# Patient Record
Sex: FEMALE | Race: WHITE
[De-identification: names, ages, dates, MRNs, and addresses within clinical notes are randomized per-mention and may not be internally consistent; named-entity substitution may affect disease eponyms.]

---

## 2017-05-30 ENCOUNTER — APPOINTMENT (OUTPATIENT)
Dept: NEUROLOGY | Facility: CLINIC | Age: 77
End: 2017-05-30

## 2017-11-02 ENCOUNTER — EMERGENCY (EMERGENCY)
Facility: HOSPITAL | Age: 77
LOS: 0 days | Discharge: HOME | End: 2017-11-02
Attending: EMERGENCY MEDICINE | Admitting: EMERGENCY MEDICINE
Payer: COMMERCIAL

## 2017-11-02 VITALS
SYSTOLIC BLOOD PRESSURE: 127 MMHG | TEMPERATURE: 98 F | OXYGEN SATURATION: 100 % | HEIGHT: 62 IN | DIASTOLIC BLOOD PRESSURE: 67 MMHG | RESPIRATION RATE: 18 BRPM | HEART RATE: 63 BPM | WEIGHT: 147.05 LBS

## 2017-11-02 VITALS
HEART RATE: 62 BPM | RESPIRATION RATE: 18 BRPM | SYSTOLIC BLOOD PRESSURE: 99 MMHG | TEMPERATURE: 98 F | DIASTOLIC BLOOD PRESSURE: 61 MMHG | OXYGEN SATURATION: 99 %

## 2017-11-02 DIAGNOSIS — Z98.42 CATARACT EXTRACTION STATUS, LEFT EYE: Chronic | ICD-10-CM

## 2017-11-02 LAB
ALBUMIN SERPL ELPH-MCNC: 3.8 G/DL — SIGNIFICANT CHANGE UP (ref 3.3–5)
ALP SERPL-CCNC: 78 U/L — SIGNIFICANT CHANGE UP (ref 40–120)
ALT FLD-CCNC: 40 U/L — SIGNIFICANT CHANGE UP (ref 12–78)
ANION GAP SERPL CALC-SCNC: 9 MMOL/L — SIGNIFICANT CHANGE UP (ref 5–17)
APPEARANCE UR: CLEAR — SIGNIFICANT CHANGE UP
APTT BLD: 26 SEC — LOW (ref 27.5–37.4)
AST SERPL-CCNC: 27 U/L — SIGNIFICANT CHANGE UP (ref 15–37)
BACTERIA # UR AUTO: (no result)
BASOPHILS # BLD AUTO: 0.1 K/UL — SIGNIFICANT CHANGE UP (ref 0–0.2)
BASOPHILS NFR BLD AUTO: 0.7 % — SIGNIFICANT CHANGE UP (ref 0–2)
BILIRUB SERPL-MCNC: 1.7 MG/DL — HIGH (ref 0.2–1.2)
BILIRUB UR-MCNC: NEGATIVE — SIGNIFICANT CHANGE UP
BUN SERPL-MCNC: 13 MG/DL — SIGNIFICANT CHANGE UP (ref 7–23)
CALCIUM SERPL-MCNC: 9.4 MG/DL — SIGNIFICANT CHANGE UP (ref 8.5–10.1)
CHLORIDE SERPL-SCNC: 109 MMOL/L — HIGH (ref 96–108)
CO2 SERPL-SCNC: 24 MMOL/L — SIGNIFICANT CHANGE UP (ref 22–31)
COLOR SPEC: YELLOW — SIGNIFICANT CHANGE UP
CREAT SERPL-MCNC: 1.27 MG/DL — SIGNIFICANT CHANGE UP (ref 0.5–1.3)
DIFF PNL FLD: (no result)
EOSINOPHIL # BLD AUTO: 0 K/UL — SIGNIFICANT CHANGE UP (ref 0–0.5)
EOSINOPHIL NFR BLD AUTO: 0 % — SIGNIFICANT CHANGE UP (ref 0–6)
EPI CELLS # UR: (no result)
GLUCOSE SERPL-MCNC: 137 MG/DL — HIGH (ref 70–99)
GLUCOSE UR QL: NEGATIVE MG/DL — SIGNIFICANT CHANGE UP
HCT VFR BLD CALC: 51.1 % — HIGH (ref 34.5–45)
HGB BLD-MCNC: 16.6 G/DL — HIGH (ref 11.5–15.5)
INR BLD: 1.05 RATIO — SIGNIFICANT CHANGE UP (ref 0.88–1.16)
KETONES UR-MCNC: (no result)
LEUKOCYTE ESTERASE UR-ACNC: (no result)
LIDOCAIN IGE QN: 114 U/L — SIGNIFICANT CHANGE UP (ref 73–393)
LYMPHOCYTES # BLD AUTO: 0.8 K/UL — LOW (ref 1–3.3)
LYMPHOCYTES # BLD AUTO: 7.2 % — LOW (ref 13–44)
MAGNESIUM SERPL-MCNC: 2.3 MG/DL — SIGNIFICANT CHANGE UP (ref 1.6–2.6)
MCHC RBC-ENTMCNC: 29.2 PG — SIGNIFICANT CHANGE UP (ref 27–34)
MCHC RBC-ENTMCNC: 32.5 GM/DL — SIGNIFICANT CHANGE UP (ref 32–36)
MCV RBC AUTO: 89.9 FL — SIGNIFICANT CHANGE UP (ref 80–100)
MONOCYTES # BLD AUTO: 0.2 K/UL — SIGNIFICANT CHANGE UP (ref 0–0.9)
MONOCYTES NFR BLD AUTO: 2 % — SIGNIFICANT CHANGE UP (ref 2–14)
NEUTROPHILS # BLD AUTO: 9.7 K/UL — HIGH (ref 1.8–7.4)
NEUTROPHILS NFR BLD AUTO: 90 % — HIGH (ref 43–77)
NITRITE UR-MCNC: NEGATIVE — SIGNIFICANT CHANGE UP
PH UR: 8 — SIGNIFICANT CHANGE UP (ref 5–8)
PLATELET # BLD AUTO: 262 K/UL — SIGNIFICANT CHANGE UP (ref 150–400)
POTASSIUM SERPL-MCNC: 3.9 MMOL/L — SIGNIFICANT CHANGE UP (ref 3.5–5.3)
POTASSIUM SERPL-SCNC: 3.9 MMOL/L — SIGNIFICANT CHANGE UP (ref 3.5–5.3)
PROT SERPL-MCNC: 7.6 GM/DL — SIGNIFICANT CHANGE UP (ref 6–8.3)
PROT UR-MCNC: 15 MG/DL
PROTHROM AB SERPL-ACNC: 11.4 SEC — SIGNIFICANT CHANGE UP (ref 9.8–12.7)
RBC # BLD: 5.69 M/UL — HIGH (ref 3.8–5.2)
RBC # FLD: 13.4 % — SIGNIFICANT CHANGE UP (ref 10.3–14.5)
RBC CASTS # UR COMP ASSIST: (no result) /HPF (ref 0–4)
SODIUM SERPL-SCNC: 142 MMOL/L — SIGNIFICANT CHANGE UP (ref 135–145)
SP GR SPEC: 1.01 — SIGNIFICANT CHANGE UP (ref 1.01–1.02)
UROBILINOGEN FLD QL: NEGATIVE MG/DL — SIGNIFICANT CHANGE UP
WBC # BLD: 10.8 K/UL — HIGH (ref 3.8–10.5)
WBC # FLD AUTO: 10.8 K/UL — HIGH (ref 3.8–10.5)
WBC UR QL: (no result)

## 2017-11-02 PROCEDURE — 74176 CT ABD & PELVIS W/O CONTRAST: CPT | Mod: 26

## 2017-11-02 PROCEDURE — 99284 EMERGENCY DEPT VISIT MOD MDM: CPT

## 2017-11-02 RX ORDER — SODIUM CHLORIDE 9 MG/ML
1000 INJECTION INTRAMUSCULAR; INTRAVENOUS; SUBCUTANEOUS ONCE
Qty: 0 | Refills: 0 | Status: COMPLETED | OUTPATIENT
Start: 2017-11-02 | End: 2017-11-02

## 2017-11-02 RX ORDER — ONDANSETRON 8 MG/1
4 TABLET, FILM COATED ORAL ONCE
Qty: 0 | Refills: 0 | Status: COMPLETED | OUTPATIENT
Start: 2017-11-02 | End: 2017-11-02

## 2017-11-02 RX ORDER — CEPHALEXIN 500 MG
1 CAPSULE ORAL
Qty: 28 | Refills: 0
Start: 2017-11-02 | End: 2017-11-09

## 2017-11-02 RX ORDER — MORPHINE SULFATE 50 MG/1
4 CAPSULE, EXTENDED RELEASE ORAL ONCE
Qty: 0 | Refills: 0 | Status: DISCONTINUED | OUTPATIENT
Start: 2017-11-02 | End: 2017-11-02

## 2017-11-02 RX ADMIN — SODIUM CHLORIDE 1000 MILLILITER(S): 9 INJECTION INTRAMUSCULAR; INTRAVENOUS; SUBCUTANEOUS at 03:29

## 2017-11-02 RX ADMIN — MORPHINE SULFATE 4 MILLIGRAM(S): 50 CAPSULE, EXTENDED RELEASE ORAL at 03:01

## 2017-11-02 RX ADMIN — MORPHINE SULFATE 4 MILLIGRAM(S): 50 CAPSULE, EXTENDED RELEASE ORAL at 03:29

## 2017-11-02 RX ADMIN — ONDANSETRON 4 MILLIGRAM(S): 8 TABLET, FILM COATED ORAL at 03:01

## 2017-11-02 NOTE — ED PROVIDER NOTE - OBJECTIVE STATEMENT
76 yo female with h/o hypothyroidsism c/o right sided back and abdominal pain since around 4pm.  +nausea and vomiting.  Patient states that in the car on the way to the ED, the pain improved and she currently has no pain.  Tried taking an Aleve at home, but vomited it up.  Pt reports many months of decreased appetite and difficulty tasting food, which is being worked up as an outpatient.  She also has had many months of increased BMs, also being worked up.   No fevers/chills.  no chest pain, sob.  No URI symptoms.  Denies previous h/o similar.  Does not drink or smoke.  PSH:  cholecystectomy and partial colectomy for diverticulitis.

## 2017-11-02 NOTE — ED PROVIDER NOTE - PMH
Acute type 2 diabetes mellitus with manifestations  boderline- diet controlled  Ankle fracture  s/p right- cast  Diverticulosis    Fibromyalgia    H/O: hypothyroidism    Hypercholesterolemia

## 2017-11-02 NOTE — ED PROVIDER NOTE - PSH
Diverticulitis of sigmoid colon  s/p sigmoidectomy  History of cholecystectomy    History of tonsillectomy    S/P cataract surgery, left

## 2017-11-02 NOTE — ED PROVIDER NOTE - CONSTITUTIONAL, MLM
normal... Well appearing, well nourished, awake, alert, oriented to person, place, time/situation and in no apparent distress.  Well appearing

## 2017-11-07 DIAGNOSIS — R10.9 UNSPECIFIED ABDOMINAL PAIN: ICD-10-CM

## 2017-11-07 DIAGNOSIS — K57.30 DIVERTICULOSIS OF LARGE INTESTINE WITHOUT PERFORATION OR ABSCESS WITHOUT BLEEDING: ICD-10-CM

## 2017-11-07 DIAGNOSIS — M54.9 DORSALGIA, UNSPECIFIED: ICD-10-CM

## 2017-11-07 DIAGNOSIS — E03.9 HYPOTHYROIDISM, UNSPECIFIED: ICD-10-CM

## 2017-11-07 DIAGNOSIS — N39.0 URINARY TRACT INFECTION, SITE NOT SPECIFIED: ICD-10-CM

## 2017-11-07 DIAGNOSIS — E11.9 TYPE 2 DIABETES MELLITUS WITHOUT COMPLICATIONS: ICD-10-CM

## 2017-11-07 DIAGNOSIS — R11.2 NAUSEA WITH VOMITING, UNSPECIFIED: ICD-10-CM

## 2018-04-07 ENCOUNTER — EMERGENCY (EMERGENCY)
Facility: HOSPITAL | Age: 78
LOS: 0 days | Discharge: HOME | End: 2018-04-07
Attending: EMERGENCY MEDICINE | Admitting: EMERGENCY MEDICINE
Payer: COMMERCIAL

## 2018-04-07 VITALS
TEMPERATURE: 97 F | WEIGHT: 149.91 LBS | SYSTOLIC BLOOD PRESSURE: 149 MMHG | DIASTOLIC BLOOD PRESSURE: 74 MMHG | RESPIRATION RATE: 14 BRPM | OXYGEN SATURATION: 100 % | HEART RATE: 76 BPM | HEIGHT: 63 IN

## 2018-04-07 DIAGNOSIS — R11.2 NAUSEA WITH VOMITING, UNSPECIFIED: ICD-10-CM

## 2018-04-07 DIAGNOSIS — Z98.42 CATARACT EXTRACTION STATUS, LEFT EYE: Chronic | ICD-10-CM

## 2018-04-07 DIAGNOSIS — B34.9 VIRAL INFECTION, UNSPECIFIED: ICD-10-CM

## 2018-04-07 DIAGNOSIS — R50.9 FEVER, UNSPECIFIED: ICD-10-CM

## 2018-04-07 PROCEDURE — 70450 CT HEAD/BRAIN W/O DYE: CPT | Mod: 26

## 2018-04-07 PROCEDURE — 70486 CT MAXILLOFACIAL W/O DYE: CPT | Mod: 26

## 2018-04-07 PROCEDURE — 72125 CT NECK SPINE W/O DYE: CPT | Mod: 26

## 2018-04-07 PROCEDURE — 73564 X-RAY EXAM KNEE 4 OR MORE: CPT | Mod: 26,RT

## 2018-04-07 PROCEDURE — 71045 X-RAY EXAM CHEST 1 VIEW: CPT | Mod: 26

## 2018-04-07 PROCEDURE — 76377 3D RENDER W/INTRP POSTPROCES: CPT | Mod: 26

## 2018-04-07 PROCEDURE — 99284 EMERGENCY DEPT VISIT MOD MDM: CPT

## 2018-04-07 PROCEDURE — 72190 X-RAY EXAM OF PELVIS: CPT | Mod: 26

## 2018-04-07 RX ORDER — TETANUS TOXOID, REDUCED DIPHTHERIA TOXOID AND ACELLULAR PERTUSSIS VACCINE, ADSORBED 5; 2.5; 8; 8; 2.5 [IU]/.5ML; [IU]/.5ML; UG/.5ML; UG/.5ML; UG/.5ML
0.5 SUSPENSION INTRAMUSCULAR ONCE
Qty: 0 | Refills: 0 | Status: COMPLETED | OUTPATIENT
Start: 2018-04-07 | End: 2018-04-07

## 2018-04-07 RX ORDER — CEPHALEXIN 500 MG
1 CAPSULE ORAL
Qty: 20 | Refills: 0
Start: 2018-04-07 | End: 2018-04-11

## 2018-04-07 RX ORDER — OXYCODONE AND ACETAMINOPHEN 5; 325 MG/1; MG/1
1 TABLET ORAL ONCE
Qty: 0 | Refills: 0 | Status: DISCONTINUED | OUTPATIENT
Start: 2018-04-07 | End: 2018-04-07

## 2018-04-07 RX ORDER — CEPHALEXIN 500 MG
500 CAPSULE ORAL ONCE
Qty: 0 | Refills: 0 | Status: COMPLETED | OUTPATIENT
Start: 2018-04-07 | End: 2018-04-07

## 2018-04-07 RX ADMIN — OXYCODONE AND ACETAMINOPHEN 1 TABLET(S): 5; 325 TABLET ORAL at 20:28

## 2018-04-07 RX ADMIN — TETANUS TOXOID, REDUCED DIPHTHERIA TOXOID AND ACELLULAR PERTUSSIS VACCINE, ADSORBED 0.5 MILLILITER(S): 5; 2.5; 8; 8; 2.5 SUSPENSION INTRAMUSCULAR at 20:26

## 2018-04-07 RX ADMIN — Medication 500 MILLIGRAM(S): at 20:26

## 2018-04-07 NOTE — ED STATDOCS - OBJECTIVE STATEMENT
76 y/o female with a PMHx of hypothyroid presents to the ED s/p fall today. Pt states she was in front of her house and fell down 2 steps outside landing on her face. No LOC. No anti-coagulants. Pt now c/o upper lip laceration, tooth pain, buttocks pain, LLE pain. No fever or any other acute complaints at this time. Pt states she has bonded teeth. Tetanus is not UTD.

## 2018-04-07 NOTE — ED STATDOCS - ATTENDING CONTRIBUTION TO CARE
I, Reji Garza MD,  performed the initial face to face bedside interview with this patient regarding history of present illness, review of symptoms and relevant past medical, social and family history.  I completed an independent physical examination.  I was the initial provider who evaluated this patient. I have signed out the follow up of any pending tests (i.e. labs, radiological studies) to the ACP.  I have communicated the patient’s plan of care and disposition with the ACP.

## 2018-04-07 NOTE — ED ADULT NURSE NOTE - CHIEF COMPLAINT QUOTE
fall in front of house, chipped tooth. ambulatory at scene. denies LOC, denies blood thinners, denies LOC.

## 2018-04-07 NOTE — ED ADULT NURSE NOTE - OBJECTIVE STATEMENT
Fall outside of house down 2 steps.  C/o toothpain, laceration to lip, back pain.  Pt denies LOC, denies hitting head.

## 2018-04-07 NOTE — ED STATDOCS - SKIN, MLM
skin normal color for race, warm, dry. +laceration to right inner lip approximately 2-3mm with bonding debris inside. Laceration to right outer lip approximately 2mm.

## 2018-04-07 NOTE — ED STATDOCS - PROGRESS NOTE DETAILS
2 lacerations repaired under sterile fashion. See procedure note. Patient is feeling much better, tests/labs reviewed. case discussed with attending. OK to dc home. LINDA Lehman

## 2018-04-07 NOTE — ED PROCEDURE NOTE - CPROC ED POST PROC CARE GUIDE1
Keep the cast/splint/dressing clean and dry./Verbal/written post procedure instructions were given to patient/caregiver./Instructed patient/caregiver to follow-up with primary care physician./Instructed patient/caregiver regarding signs and symptoms of infection.
Verbal/written post procedure instructions were given to patient/caregiver./Instructed patient/caregiver regarding signs and symptoms of infection./Keep the cast/splint/dressing clean and dry./Instructed patient/caregiver to follow-up with primary care physician.

## 2018-04-07 NOTE — ED STATDOCS - NS_ ATTENDINGSCRIBEDETAILS _ED_A_ED_FT
I, Reji Garza MD,  performed the initial face to face bedside interview with this patient regarding history of present illness, review of symptoms and relevant past medical, social and family history.  I completed an independent physical examination.    The history, relevant review of systems, past medical and surgical history, medical decision making, and physical examination was documented by the scribe in my presence and I attest to the accuracy of the documentation.

## 2018-04-07 NOTE — ED PROCEDURE NOTE - CPROC ED LACER REPAIR DETAIL1
All foreign material was removed.
The wound was explored to base in bloodless field./No foreign body

## 2018-04-07 NOTE — ED STATDOCS - PHYSICAL EXAMINATION
GEN: AOX3, NAD. HEENT: Throat clear. +cracked central incisor noted. Head NC/AT. FACE: LAC#1: 1.0 cm LAC noted inner aspect of upper lip mucosa. Mild bleeding. LAC#2: +1.0cm very superficial linear LAC noted upper lip that does not involve vermilion border. No bleeding. Not through and through. No FBs. NECK: Supple, No JVD. FROM. C-spine non-tender. CV:S1S2, RRR, LUNGS: CTA b/l, no w/r/r. ABD: Soft, NT/ND, no rebound, no guarding. No CVAT. EXT: No e/c/c. Minimal tenderness left knee and right hip. NO deformity. FROM. 2+ distal pulses. SKIN: No rashes. NEURO: No focal deficits. CN II-XII intact. FROM. 5/5 motor and sensory. LINDA Lehman

## 2018-04-07 NOTE — ED STATDOCS - CARE PLAN
Principal Discharge DX:	Lip laceration  Secondary Diagnosis:	Dental injury  Secondary Diagnosis:	Knee injury

## 2018-04-07 NOTE — ED ADULT TRIAGE NOTE - CHIEF COMPLAINT QUOTE
fall in front of house, chipped tooth. ambulatory at scene. denies LOC, denies blood thinners. fall in front of house, chipped tooth. ambulatory at scene. denies LOC, denies blood thinners, denies LOC.

## 2018-04-07 NOTE — ED STATDOCS - ENMT, MLM
Nasal mucosa clear.  Mouth with normal mucosa  Throat has no vesicles, no oropharyngeal exudates and uvula is midline. 2 front teeth are chipped and bonding material flaking off

## 2018-05-27 ENCOUNTER — EMERGENCY (EMERGENCY)
Facility: HOSPITAL | Age: 78
LOS: 0 days | Discharge: HOME | End: 2018-05-27
Attending: EMERGENCY MEDICINE | Admitting: EMERGENCY MEDICINE
Payer: COMMERCIAL

## 2018-05-27 VITALS
HEART RATE: 82 BPM | RESPIRATION RATE: 18 BRPM | TEMPERATURE: 98 F | DIASTOLIC BLOOD PRESSURE: 90 MMHG | HEIGHT: 62 IN | WEIGHT: 139.99 LBS | SYSTOLIC BLOOD PRESSURE: 140 MMHG | OXYGEN SATURATION: 100 %

## 2018-05-27 DIAGNOSIS — Z98.42 CATARACT EXTRACTION STATUS, LEFT EYE: Chronic | ICD-10-CM

## 2018-05-27 DIAGNOSIS — M79.7 FIBROMYALGIA: ICD-10-CM

## 2018-05-27 DIAGNOSIS — E11.9 TYPE 2 DIABETES MELLITUS WITHOUT COMPLICATIONS: ICD-10-CM

## 2018-05-27 DIAGNOSIS — E78.5 HYPERLIPIDEMIA, UNSPECIFIED: ICD-10-CM

## 2018-05-27 DIAGNOSIS — R31.9 HEMATURIA, UNSPECIFIED: ICD-10-CM

## 2018-05-27 DIAGNOSIS — N30.01 ACUTE CYSTITIS WITH HEMATURIA: ICD-10-CM

## 2018-05-27 DIAGNOSIS — K57.90 DIVERTICULOSIS OF INTESTINE, PART UNSPECIFIED, WITHOUT PERFORATION OR ABSCESS WITHOUT BLEEDING: ICD-10-CM

## 2018-05-27 LAB
APPEARANCE UR: (no result)
BACTERIA # UR AUTO: (no result)
BILIRUB UR-MCNC: (no result)
COLOR SPEC: (no result)
DIFF PNL FLD: (no result)
EPI CELLS # UR: SIGNIFICANT CHANGE UP
GLUCOSE UR QL: NEGATIVE MG/DL — SIGNIFICANT CHANGE UP
KETONES UR-MCNC: (no result)
LEUKOCYTE ESTERASE UR-ACNC: (no result)
NITRITE UR-MCNC: POSITIVE
PH UR: 6 — SIGNIFICANT CHANGE UP (ref 5–8)
PROT UR-MCNC: 500 MG/DL
RBC CASTS # UR COMP ASSIST: >50 /HPF (ref 0–4)
SP GR SPEC: 1.02 — SIGNIFICANT CHANGE UP (ref 1.01–1.02)
UROBILINOGEN FLD QL: 4 MG/DL
WBC UR QL: >50

## 2018-05-27 PROCEDURE — 99283 EMERGENCY DEPT VISIT LOW MDM: CPT

## 2018-05-27 RX ORDER — PHENAZOPYRIDINE HCL 100 MG
100 TABLET ORAL ONCE
Qty: 0 | Refills: 0 | Status: COMPLETED | OUTPATIENT
Start: 2018-05-27 | End: 2018-05-27

## 2018-05-27 RX ORDER — NITROFURANTOIN MACROCRYSTAL 50 MG
100 CAPSULE ORAL ONCE
Qty: 0 | Refills: 0 | Status: DISCONTINUED | OUTPATIENT
Start: 2018-05-27 | End: 2018-05-27

## 2018-05-27 RX ORDER — PHENAZOPYRIDINE HCL 100 MG
1 TABLET ORAL
Qty: 6 | Refills: 0
Start: 2018-05-27 | End: 2018-05-28

## 2018-05-27 RX ORDER — NITROFURANTOIN MACROCRYSTAL 50 MG
1 CAPSULE ORAL
Qty: 14 | Refills: 0
Start: 2018-05-27 | End: 2018-06-02

## 2018-05-27 RX ADMIN — Medication 100 MILLIGRAM(S): at 23:15

## 2018-05-27 NOTE — ED ADULT NURSE NOTE - OBJECTIVE STATEMENT
77 year old female presenting to the ED with a one day history of frequent urination, pain with urination, and pink colored urine. Patient states that she has been having frequent episodes of "dribbling," urine, with the sensation of urinary fullness post voiding. Patient reports mild 3/10 burning sensation during urination, and that the urine is red/pink in coloration.   Negative: Fevers, chills, headaches, syncope, vision changes, change in baseline mentation, chest pain, shortness of breath, abdominal pain, nausea, vomiting, constipation, diarrhea, CVA tenderness, flank pain, reported stone passage, trauma, vaginal bleeding, or edema.   Abdomen soft/non-tender without CVA or bladder tenderness/distention.

## 2018-05-27 NOTE — ED ADULT NURSE REASSESSMENT NOTE - NS ED NURSE REASSESS COMMENT FT1
2345: Extensive patient education on signs and symptoms to return to the ER as well as follow up care preformed with patient and her spouse.

## 2018-05-27 NOTE — ED PROVIDER NOTE - MEDICAL DECISION MAKING DETAILS
Likely UTI/cystitis: Will send UA and urine culture and treat with pyridium.  If UA is positive, antibiotic will also be given.

## 2018-05-27 NOTE — ED PROVIDER NOTE - OBJECTIVE STATEMENT
Pt. is a 76 yo F with a hx of diverticulitis and colon resection, dementia BIB  for 1 day of urinary frequency and blood tinged urine.  Pt. denies fevers, has normal PO intake without vomiting.  +dysuria    states pt. has had UTI once in the past with the same symptoms.  Pt has small volumes of urine when she does go to the bathroom.  Pt. denies abdominal pain or back pain or trauma.  Pt. is not taking any anti-coagulation.

## 2018-05-30 LAB
-  AMIKACIN: SIGNIFICANT CHANGE UP
-  AMOXICILLIN/CLAVULANIC ACID: SIGNIFICANT CHANGE UP
-  AMPICILLIN/SULBACTAM: SIGNIFICANT CHANGE UP
-  AMPICILLIN: SIGNIFICANT CHANGE UP
-  AZTREONAM: SIGNIFICANT CHANGE UP
-  CEFAZOLIN: SIGNIFICANT CHANGE UP
-  CEFEPIME: SIGNIFICANT CHANGE UP
-  CEFOXITIN: SIGNIFICANT CHANGE UP
-  CEFTRIAXONE: SIGNIFICANT CHANGE UP
-  CIPROFLOXACIN: SIGNIFICANT CHANGE UP
-  ERTAPENEM: SIGNIFICANT CHANGE UP
-  GENTAMICIN: SIGNIFICANT CHANGE UP
-  IMIPENEM: SIGNIFICANT CHANGE UP
-  LEVOFLOXACIN: SIGNIFICANT CHANGE UP
-  MEROPENEM: SIGNIFICANT CHANGE UP
-  NITROFURANTOIN: SIGNIFICANT CHANGE UP
-  PIPERACILLIN/TAZOBACTAM: SIGNIFICANT CHANGE UP
-  TIGECYCLINE: SIGNIFICANT CHANGE UP
-  TOBRAMYCIN: SIGNIFICANT CHANGE UP
-  TRIMETHOPRIM/SULFAMETHOXAZOLE: SIGNIFICANT CHANGE UP
METHOD TYPE: SIGNIFICANT CHANGE UP

## 2018-05-31 LAB
-  AMPICILLIN: SIGNIFICANT CHANGE UP
-  CIPROFLOXACIN: SIGNIFICANT CHANGE UP
-  NITROFURANTOIN: SIGNIFICANT CHANGE UP
-  TETRACYCLINE: SIGNIFICANT CHANGE UP
-  VANCOMYCIN: SIGNIFICANT CHANGE UP
CULTURE RESULTS: SIGNIFICANT CHANGE UP
METHOD TYPE: SIGNIFICANT CHANGE UP
ORGANISM # SPEC MICROSCOPIC CNT: SIGNIFICANT CHANGE UP
SPECIMEN SOURCE: SIGNIFICANT CHANGE UP

## 2019-03-29 NOTE — ED ADULT TRIAGE NOTE - MEANS OF ARRIVAL
Last 5 Patient Entered Readings                                      Current 30 Day Average: 164/104     Recent Readings 3/28/2019 3/27/2019 3/26/2019 3/24/2019 3/23/2019    SBP (mmHg) 142 154 144 131 151    DBP (mmHg) 91 94 103 87 101    Pulse 105 99 117 127 95          Digital Medicine Enrollment Call    Introduced Mrs. Marlyn Kim to Digital Medicine.     Discussed program expectations and requirements.    Introduced digital medicine care team.     Reviewed the importance of self-monitoring for digital medicine participation.     Reviewed that the Digital Medicine team is not available for emergencies and instructed the patient to call 911 or [facility] ([phone] or [phone]) if one arises.    Reviewed proper blood pressure technique. No questions or concerns.      ambulatory

## 2020-02-18 NOTE — ED PROVIDER NOTE - NS_EDPROVIDERDISPOUSERTYPE_ED_A_ED
Attending Attestation (For Attendings USE Only)... Tranexamic Acid Pregnancy And Lactation Text: It is unknown if this medication is safe during pregnancy or breast feeding.

## 2020-07-16 ENCOUNTER — INPATIENT (INPATIENT)
Facility: HOSPITAL | Age: 80
LOS: 4 days | Discharge: HOME HEALTH | DRG: 312 | End: 2020-07-21
Attending: FAMILY MEDICINE | Admitting: INTERNAL MEDICINE
Payer: MEDICARE

## 2020-07-16 VITALS
OXYGEN SATURATION: 97 % | HEART RATE: 76 BPM | WEIGHT: 179.9 LBS | TEMPERATURE: 98 F | RESPIRATION RATE: 18 BRPM | DIASTOLIC BLOOD PRESSURE: 69 MMHG | HEIGHT: 66 IN | SYSTOLIC BLOOD PRESSURE: 94 MMHG

## 2020-07-16 DIAGNOSIS — R55 SYNCOPE AND COLLAPSE: ICD-10-CM

## 2020-07-16 DIAGNOSIS — Z98.42 CATARACT EXTRACTION STATUS, LEFT EYE: Chronic | ICD-10-CM

## 2020-07-16 LAB
ALBUMIN SERPL ELPH-MCNC: 3.8 G/DL — SIGNIFICANT CHANGE UP (ref 3.3–5)
ALP SERPL-CCNC: 78 U/L — SIGNIFICANT CHANGE UP (ref 40–120)
ALT FLD-CCNC: 19 U/L — SIGNIFICANT CHANGE UP (ref 12–78)
ANION GAP SERPL CALC-SCNC: 6 MMOL/L — SIGNIFICANT CHANGE UP (ref 5–17)
APPEARANCE UR: ABNORMAL
AST SERPL-CCNC: 15 U/L — SIGNIFICANT CHANGE UP (ref 15–37)
BASOPHILS # BLD AUTO: 0.05 K/UL — SIGNIFICANT CHANGE UP (ref 0–0.2)
BASOPHILS NFR BLD AUTO: 0.5 % — SIGNIFICANT CHANGE UP (ref 0–2)
BILIRUB SERPL-MCNC: 2.5 MG/DL — HIGH (ref 0.2–1.2)
BILIRUB UR-MCNC: NEGATIVE — SIGNIFICANT CHANGE UP
BUN SERPL-MCNC: 11 MG/DL — SIGNIFICANT CHANGE UP (ref 7–23)
CALCIUM SERPL-MCNC: 9.1 MG/DL — SIGNIFICANT CHANGE UP (ref 8.5–10.1)
CHLORIDE SERPL-SCNC: 103 MMOL/L — SIGNIFICANT CHANGE UP (ref 96–108)
CO2 SERPL-SCNC: 25 MMOL/L — SIGNIFICANT CHANGE UP (ref 22–31)
COLOR SPEC: YELLOW — SIGNIFICANT CHANGE UP
CREAT SERPL-MCNC: 1.19 MG/DL — SIGNIFICANT CHANGE UP (ref 0.5–1.3)
DIFF PNL FLD: ABNORMAL
EOSINOPHIL # BLD AUTO: 0 K/UL — SIGNIFICANT CHANGE UP (ref 0–0.5)
EOSINOPHIL NFR BLD AUTO: 0 % — SIGNIFICANT CHANGE UP (ref 0–6)
GLUCOSE SERPL-MCNC: 126 MG/DL — HIGH (ref 70–99)
GLUCOSE UR QL: NEGATIVE MG/DL — SIGNIFICANT CHANGE UP
HCT VFR BLD CALC: 41 % — SIGNIFICANT CHANGE UP (ref 34.5–45)
HGB BLD-MCNC: 13.4 G/DL — SIGNIFICANT CHANGE UP (ref 11.5–15.5)
IMM GRANULOCYTES NFR BLD AUTO: 0.3 % — SIGNIFICANT CHANGE UP (ref 0–1.5)
KETONES UR-MCNC: NEGATIVE — SIGNIFICANT CHANGE UP
LACTATE SERPL-SCNC: 1.7 MMOL/L — SIGNIFICANT CHANGE UP (ref 0.7–2)
LEUKOCYTE ESTERASE UR-ACNC: ABNORMAL
LIDOCAIN IGE QN: 108 U/L — SIGNIFICANT CHANGE UP (ref 73–393)
LYMPHOCYTES # BLD AUTO: 0.71 K/UL — LOW (ref 1–3.3)
LYMPHOCYTES # BLD AUTO: 7.7 % — LOW (ref 13–44)
MCHC RBC-ENTMCNC: 28.6 PG — SIGNIFICANT CHANGE UP (ref 27–34)
MCHC RBC-ENTMCNC: 32.7 GM/DL — SIGNIFICANT CHANGE UP (ref 32–36)
MCV RBC AUTO: 87.6 FL — SIGNIFICANT CHANGE UP (ref 80–100)
MONOCYTES # BLD AUTO: 0.41 K/UL — SIGNIFICANT CHANGE UP (ref 0–0.9)
MONOCYTES NFR BLD AUTO: 4.5 % — SIGNIFICANT CHANGE UP (ref 2–14)
NEUTROPHILS # BLD AUTO: 7.97 K/UL — HIGH (ref 1.8–7.4)
NEUTROPHILS NFR BLD AUTO: 87 % — HIGH (ref 43–77)
NITRITE UR-MCNC: POSITIVE
PH UR: 6 — SIGNIFICANT CHANGE UP (ref 5–8)
PLATELET # BLD AUTO: 187 K/UL — SIGNIFICANT CHANGE UP (ref 150–400)
POTASSIUM SERPL-MCNC: 4.2 MMOL/L — SIGNIFICANT CHANGE UP (ref 3.5–5.3)
POTASSIUM SERPL-SCNC: 4.2 MMOL/L — SIGNIFICANT CHANGE UP (ref 3.5–5.3)
PROT SERPL-MCNC: 7.8 GM/DL — SIGNIFICANT CHANGE UP (ref 6–8.3)
PROT UR-MCNC: 100 MG/DL
RBC # BLD: 4.68 M/UL — SIGNIFICANT CHANGE UP (ref 3.8–5.2)
RBC # FLD: 14.2 % — SIGNIFICANT CHANGE UP (ref 10.3–14.5)
SODIUM SERPL-SCNC: 134 MMOL/L — LOW (ref 135–145)
SP GR SPEC: 1.01 — SIGNIFICANT CHANGE UP (ref 1.01–1.02)
UROBILINOGEN FLD QL: NEGATIVE MG/DL — SIGNIFICANT CHANGE UP
WBC # BLD: 9.17 K/UL — SIGNIFICANT CHANGE UP (ref 3.8–10.5)
WBC # FLD AUTO: 9.17 K/UL — SIGNIFICANT CHANGE UP (ref 3.8–10.5)

## 2020-07-16 PROCEDURE — 85027 COMPLETE CBC AUTOMATED: CPT

## 2020-07-16 PROCEDURE — 71045 X-RAY EXAM CHEST 1 VIEW: CPT | Mod: 26,CS

## 2020-07-16 PROCEDURE — 97530 THERAPEUTIC ACTIVITIES: CPT | Mod: GP

## 2020-07-16 PROCEDURE — 99223 1ST HOSP IP/OBS HIGH 75: CPT

## 2020-07-16 PROCEDURE — 93010 ELECTROCARDIOGRAM REPORT: CPT

## 2020-07-16 PROCEDURE — 36415 COLL VENOUS BLD VENIPUNCTURE: CPT

## 2020-07-16 PROCEDURE — 97162 PT EVAL MOD COMPLEX 30 MIN: CPT | Mod: GP

## 2020-07-16 PROCEDURE — 70450 CT HEAD/BRAIN W/O DYE: CPT | Mod: 26

## 2020-07-16 PROCEDURE — 97116 GAIT TRAINING THERAPY: CPT | Mod: GP

## 2020-07-16 PROCEDURE — 86769 SARS-COV-2 COVID-19 ANTIBODY: CPT

## 2020-07-16 PROCEDURE — 80048 BASIC METABOLIC PNL TOTAL CA: CPT

## 2020-07-16 PROCEDURE — 93306 TTE W/DOPPLER COMPLETE: CPT

## 2020-07-16 RX ORDER — SODIUM CHLORIDE 9 MG/ML
1000 INJECTION INTRAMUSCULAR; INTRAVENOUS; SUBCUTANEOUS ONCE
Refills: 0 | Status: COMPLETED | OUTPATIENT
Start: 2020-07-16 | End: 2020-07-16

## 2020-07-16 RX ORDER — QUETIAPINE FUMARATE 200 MG/1
25 TABLET, FILM COATED ORAL AT BEDTIME
Refills: 0 | Status: DISCONTINUED | OUTPATIENT
Start: 2020-07-16 | End: 2020-07-21

## 2020-07-16 RX ORDER — ONDANSETRON 8 MG/1
4 TABLET, FILM COATED ORAL ONCE
Refills: 0 | Status: COMPLETED | OUTPATIENT
Start: 2020-07-16 | End: 2020-07-16

## 2020-07-16 RX ORDER — LEVOTHYROXINE SODIUM 125 MCG
75 TABLET ORAL DAILY
Refills: 0 | Status: DISCONTINUED | OUTPATIENT
Start: 2020-07-16 | End: 2020-07-21

## 2020-07-16 RX ORDER — FAMOTIDINE 10 MG/ML
20 INJECTION INTRAVENOUS ONCE
Refills: 0 | Status: COMPLETED | OUTPATIENT
Start: 2020-07-16 | End: 2020-07-16

## 2020-07-16 RX ORDER — ONDANSETRON 8 MG/1
4 TABLET, FILM COATED ORAL ONCE
Refills: 0 | Status: DISCONTINUED | OUTPATIENT
Start: 2020-07-16 | End: 2020-07-21

## 2020-07-16 RX ORDER — SODIUM CHLORIDE 9 MG/ML
500 INJECTION INTRAMUSCULAR; INTRAVENOUS; SUBCUTANEOUS
Refills: 0 | Status: DISCONTINUED | OUTPATIENT
Start: 2020-07-16 | End: 2020-07-20

## 2020-07-16 RX ORDER — SERTRALINE 25 MG/1
100 TABLET, FILM COATED ORAL DAILY
Refills: 0 | Status: DISCONTINUED | OUTPATIENT
Start: 2020-07-16 | End: 2020-07-21

## 2020-07-16 RX ORDER — CEFTRIAXONE 500 MG/1
1000 INJECTION, POWDER, FOR SOLUTION INTRAMUSCULAR; INTRAVENOUS ONCE
Refills: 0 | Status: COMPLETED | OUTPATIENT
Start: 2020-07-16 | End: 2020-07-16

## 2020-07-16 RX ORDER — DONEPEZIL HYDROCHLORIDE 10 MG/1
5 TABLET, FILM COATED ORAL AT BEDTIME
Refills: 0 | Status: DISCONTINUED | OUTPATIENT
Start: 2020-07-16 | End: 2020-07-21

## 2020-07-16 RX ADMIN — CEFTRIAXONE 1000 MILLIGRAM(S): 500 INJECTION, POWDER, FOR SOLUTION INTRAMUSCULAR; INTRAVENOUS at 20:48

## 2020-07-16 RX ADMIN — ONDANSETRON 4 MILLIGRAM(S): 8 TABLET, FILM COATED ORAL at 17:28

## 2020-07-16 RX ADMIN — SODIUM CHLORIDE 100 MILLILITER(S): 9 INJECTION INTRAMUSCULAR; INTRAVENOUS; SUBCUTANEOUS at 20:18

## 2020-07-16 RX ADMIN — SODIUM CHLORIDE 1000 MILLILITER(S): 9 INJECTION INTRAMUSCULAR; INTRAVENOUS; SUBCUTANEOUS at 18:28

## 2020-07-16 RX ADMIN — SODIUM CHLORIDE 1000 MILLILITER(S): 9 INJECTION INTRAMUSCULAR; INTRAVENOUS; SUBCUTANEOUS at 17:28

## 2020-07-16 RX ADMIN — FAMOTIDINE 20 MILLIGRAM(S): 10 INJECTION INTRAVENOUS at 17:28

## 2020-07-16 NOTE — ED PROVIDER NOTE - PROGRESS NOTE DETAILS
Nicholas Neff for attending Dr. Gasca. Spoke with daughterMyra at [phone]. When she arrived to check on pt, she noted she was too weak to get out of bed. Pt assisted into bathroom at 2:30 by daughter. Pt then took a bite of ivory soap. Pt instructed to spit it out, but unsure if she swallowed a piece of soap. Pt tolerated a bowl of cereal at 3:30, but vomited 10-15 mins alter. Pt vomited again just before 4pm. After that episode resolved, daughter witnessed pt's eyes roll to the "back of her head", and states she was briefly unresponsive. No choking noted. Daughter then called 911. Confirms pt has moderate alzheimer's/dementia. Nicholas Neff for attending Dr. Gasca. Hospitalist paged for admission. Phone message made and left.

## 2020-07-16 NOTE — ED PROVIDER NOTE - CLINICAL SUMMARY MEDICAL DECISION MAKING FREE TEXT BOX
79 y/o female with multiple PMHx including alzheimer's/dementia, UTIs, diverticular surgery, and T2DM presents BIBA to the ED from home s/p episode of vomiting after eating w/ associated lethargy as per EMS and family. Pt alert and in no acute distress in ED. Pt poor historian and has poor insight. BP borderline low, and pt does not meet sepsis criteria. Abd benign on exam. Plan - AMS, vomiting workup: rectal temp, CT head, CXR, EKG, blood culture, lactate, lipase, urine, COVID swab, IV zofran, pepcid, IV fluid, monitor, observe, re-assess.

## 2020-07-16 NOTE — ED PROVIDER NOTE - UNABLE TO OBTAIN
Unable to obtain complete HPI 2/2 Alzheimer's/dementia Dementia Unable to obtain ROS 2/2 alzheimer's/dementia

## 2020-07-16 NOTE — H&P ADULT - NSHPPHYSICALEXAM_GEN_ALL_CORE
Vital Signs Last 24 Hrs  T(C): 37.1 (17 Jul 2020 00:08), Max: 37.3 (16 Jul 2020 16:52)  T(F): 98.7 (17 Jul 2020 00:08), Max: 99.1 (16 Jul 2020 16:52)  HR: 75 (17 Jul 2020 00:08) (75 - 79)  BP: 122/59 (17 Jul 2020 00:08) (94/69 - 122/59)  BP(mean): 63 (16 Jul 2020 17:30) (63 - 63)  RR: 15 (16 Jul 2020 22:18) (15 - 19)  SpO2: 98% (17 Jul 2020 00:08) (97% - 100%)

## 2020-07-16 NOTE — H&P ADULT - ASSESSMENT
79 yo Female presented after syncope.    A/P:    1.  Syncope  -due to vaso vagal Syncope-after vomiting  -will follow in telemetry unit to r/o arrythmia  -follow echo report  -will get orthostatics     2.  UTI  -on Ceftriaxone  -follow cultures    3.  Dementia  -continue Aricept, Seroquel And Zoloft  -keep on fall and Elopement precaution    4.  Hypothyroidism  -on levothyroxine    5.  SCD for DVT ppx    6.  Code status: Full code.

## 2020-07-16 NOTE — ED ADULT NURSE NOTE - OBJECTIVE STATEMENT
[facility] [address], [facility] [email or web address]] [id number] [facility]  [phone]               Thank you for choosing us for your health care visit with Tamara Hernandez MD.  We are glad to serve you and happy to provide you with this [phone] [id number]   [phone]              Allergies as of Feb 03, 2017     No Known Allergies                   Current Medications          This list is accurate as of: 2/3/17  2:45 PM.  Always use your most recent med list.                Eren Pink patient alert and oriented to person, disoriented to place, time and situation BIBEMS from home where she lives with , as per , patient brought in for vomiting today and lethargy. patient endorses headache. no signs/symptoms of fever, chills, chest pain, SOB noted. axox1, hx Alzheimer's. unable to obtain additional information from patient due to dx of alzheimer's.

## 2020-07-16 NOTE — ED PROVIDER NOTE - MUSCULOSKELETAL, MLM
Spine appears normal, range of motion is not limited, no muscle or joint tenderness, b/l SLR 20 degrees w/o pain, no deformity, LINCOLN x4

## 2020-07-16 NOTE — ED ADULT NURSE NOTE - CHIEF COMPLAINT QUOTE
pt biba for vomiting today and lethargy as per  whom she lives with. pt endorses headache. AOx1, hx alzheimers. Daughter cristel [phone].

## 2020-07-16 NOTE — ED PROVIDER NOTE - NEUROLOGICAL, MLM
Alert and oriented, no focal deficits, no motor or sensory deficits. Alert, normal speech, CN 2-12 intact, +spontaneous purposeful movements.

## 2020-07-16 NOTE — ED PROVIDER NOTE - RESPIRATORY, MLM
normal respirations, no obvious crackles, rales, rhonchi, or wheezing. Occasional dry cough during exam.

## 2020-07-16 NOTE — ED ADULT TRIAGE NOTE - CHIEF COMPLAINT QUOTE
pt biba for vomiting today and lethargy as per  whom she lives with. pt endorses headache. AOx1, hx alzheimers. Daughter cristel [phone].

## 2020-07-16 NOTE — PHARMACOTHERAPY INTERVENTION NOTE - COMMENTS
med history complete, patient is unable to remember the medications she takes, called emergency contact x2 with no answer, medications entered from [facility] and [facility] records

## 2020-07-16 NOTE — H&P ADULT - NSICDXPASTMEDICALHX_GEN_ALL_CORE_FT
PAST MEDICAL HISTORY:  Acute type 2 diabetes mellitus with manifestations boderline- diet controlled    Ankle fracture s/p right- cast    Diverticulosis     Fibromyalgia     H/O: hypothyroidism     Hypercholesterolemia

## 2020-07-16 NOTE — ED PROVIDER NOTE - ENMT, MLM
Airway patent, Nasal mucosa mildly dry. Mouth with mildly dry mucosa. Throat has no vesicles, no oropharyngeal exudates and uvula is midline.

## 2020-07-16 NOTE — ED PROVIDER NOTE - OBJECTIVE STATEMENT
81 y/o female with a PMHx of anxiety, UTIs, alzheimer's, T2DM, fibromyalgia, hypothyroidism, diverticulosis and diverticular surgery, and HLD presents BIBA to the ED from  home s/p vomiting after eating. As per EMS, pt also lethargic and 100% O2 sat en route to ED. Pt denies recent falls. Pt unsure why she is in the ED. Unable to obtain complete HPI 2/2 alzheimer's and AAO x1. Chart completed via family and EMS report.

## 2020-07-17 LAB
SARS-COV-2 IGG SERPL QL IA: NEGATIVE — SIGNIFICANT CHANGE UP
SARS-COV-2 IGM SERPL IA-ACNC: <0.1 INDEX — SIGNIFICANT CHANGE UP
SARS-COV-2 RNA SPEC QL NAA+PROBE: SIGNIFICANT CHANGE UP

## 2020-07-17 PROCEDURE — 99232 SBSQ HOSP IP/OBS MODERATE 35: CPT

## 2020-07-17 PROCEDURE — 93306 TTE W/DOPPLER COMPLETE: CPT | Mod: 26

## 2020-07-17 RX ORDER — CEFTRIAXONE 500 MG/1
1000 INJECTION, POWDER, FOR SOLUTION INTRAMUSCULAR; INTRAVENOUS EVERY 24 HOURS
Refills: 0 | Status: DISCONTINUED | OUTPATIENT
Start: 2020-07-17 | End: 2020-07-21

## 2020-07-17 RX ORDER — ACETAMINOPHEN 500 MG
650 TABLET ORAL EVERY 6 HOURS
Refills: 0 | Status: DISCONTINUED | OUTPATIENT
Start: 2020-07-17 | End: 2020-07-21

## 2020-07-17 RX ADMIN — CEFTRIAXONE 1000 MILLIGRAM(S): 500 INJECTION, POWDER, FOR SOLUTION INTRAMUSCULAR; INTRAVENOUS at 22:01

## 2020-07-17 RX ADMIN — Medication 75 MICROGRAM(S): at 06:30

## 2020-07-17 RX ADMIN — QUETIAPINE FUMARATE 25 MILLIGRAM(S): 200 TABLET, FILM COATED ORAL at 22:01

## 2020-07-17 RX ADMIN — DONEPEZIL HYDROCHLORIDE 5 MILLIGRAM(S): 10 TABLET, FILM COATED ORAL at 22:01

## 2020-07-17 RX ADMIN — SERTRALINE 100 MILLIGRAM(S): 25 TABLET, FILM COATED ORAL at 11:25

## 2020-07-17 NOTE — PROGRESS NOTE ADULT - SUBJECTIVE AND OBJECTIVE BOX
Reason for Admission: complain of loss of consciousness	  History of Present Illness: 	   79 y/o Female with a PMHx of anxiety, UTIs, alzheimer's, T2DM, fibromyalgia, hypothyroidism, diverticulosis and diverticular surgery, and HLD presents BIBA to the ED from  home after syncope following vomiting after eating. As per EMS, patient was also lethargic and 100% O2 sat en route to ED. Patient denies recent falls. Patient unsure why she is in the ED. Unable to obtain complete HPI 2/2 alzheimer's and AAO x1. Chart completed via family and EMS report. Talked with daughter over phone and got the history. As per the daughter, patient was eating some cereal while sitting at the dining table area. Suddenly she vomited twice and then passed out. She came around after few minutes. She had no fall, no injury or no seizure. She had no sickness in the last few days. No fever at home.     REVIEW OF SYSTEMS:  General: NAD, hemodynamically stable, (-)  fever, (-) chills, (-) weakness  HEENT:  Eyes:  No visual loss, blurred vision, double vision or yellow sclerae. Ears, Nose, Throat:  No hearing loss, sneezing, congestion, runny nose or sore throat.  SKIN:  No rash or itching.  CARDIOVASCULAR:  No chest pain, chest pressure or chest discomfort. No palpitations or edema.  RESPIRATORY:  No shortness of breath, cough or sputum.  GASTROINTESTINAL:  No anorexia, nausea, vomiting or diarrhea. No abdominal pain or blood.  NEUROLOGICAL:  No headache, dizziness, syncope, paralysis, ataxia, numbness or tingling in the extremities. No change in bowel or bladder control.  MUSCULOSKELETAL:  No muscle, back pain, joint pain or stiffness.  HEMATOLOGIC:  No anemia, bleeding or bruising.  LYMPHATICS:  No enlarged nodes. No history of splenectomy.  ENDOCRINOLOGIC:  No reports of sweating, cold or heat intolerance. No polyuria or polydipsia.  ALLERGIES:  No history of asthma, hives, eczema or rhinitis.    Physical Exam:   GENERAL APPEARANCE:  NAD, hemodynamically stable  T(C): 36.6 (20 @ 07:37), Max: 37.3 (20 @ 16:52)  HR: 68 (20 @ 07:37) (68 - 79)  BP: 115/65 (20 @ 07:37) (94/69 - 122/59)  RR: 16 (20 @ 07:37) (15 - 19)  SpO2: 97% (20 @ 07:37) (97% - 100%)  Wt(kg): --  HEENT:  Head is normocephalic    Skin:  Warm and dry without any rash   NECK:  Supple without lymphadenopathy.   HEART:  Regular rate and rhythm. normal S1 and S2, No M/R/G  LUNGS:  Good ins/exp effort, no W/R/R/C  ABDOMEN:  Soft, nontender, nondistended with good bowel sounds heard  EXTREMITIES:  Without cyanosis, clubbing or edema.   NEUROLOGICAL:  Gross nonfocal     Labs:   CBC Full  -  ( 2020 16:38 )  WBC Count : 9.17 K/uL  RBC Count : 4.68 M/uL  Hemoglobin : 13.4 g/dL  Hematocrit : 41.0 %  Platelet Count - Automated : 187 K/uL      Urinalysis Basic - ( 2020 16:38 )    Color: Yellow / Appearance: very cloudy / S.015 / pH: x  Gluc: x / Ketone: Negative  / Bili: Negative / Urobili: Negative mg/dL   Blood: x / Protein: 100 mg/dL / Nitrite: Positive   Leuk Esterase: Moderate / RBC: 6-10 /HPF / WBC >50   Sq Epi: x / Non Sq Epi: Occasional / Bacteria: Many          134<L>  |  103  |  11  ----------------------------<  126<H>  4.2   |  25  |  1.19    Ca    9.1      2020 16:38    TPro  7.8  /  Alb  3.8  /  TBili  2.5<H>  /  DBili  x   /  AST  15  /  ALT  19  /  AlkPhos  78  -      # Syncope  - due to vaso vagal Syncope-after vomiting  - will follow in telemetry unit to r/o arrythmia  - follow echo report  - will get orthostatics     # UTI  - on Ceftriaxone  - follow cultures    # Dementia  - continue Aricept, Seroquel And Zoloft  - keep on fall and Elopement precaution    # Hypothyroidism  -on levothyroxine    Code status: Full code. Reason for Admission: complain of loss of consciousness	  History of Present Illness: 	   79 y/o Female with a PMHx of anxiety, UTIs, alzheimer's, T2DM, fibromyalgia, hypothyroidism, diverticulosis and diverticular surgery, and HLD presents BIBA to the ED from  home after syncope following vomiting after eating. As per EMS, patient was also lethargic and 100% O2 sat en route to ED. Patient denies recent falls. Patient unsure why she is in the ED. Unable to obtain complete HPI 2/2 alzheimer's and AAO x1. Chart completed via family and EMS report. Talked with daughter over phone and got the history. As per the daughter, patient was eating some cereal while sitting at the dining table area. Suddenly she vomited twice and then passed out. She came around after few minutes. She had no fall, no injury or no seizure. She had no sickness in the last few days. No fever at home.     REVIEW OF SYSTEMS:  poor historian, patient does not knows her own name. Multiple attempts were made to reach patient's daughter without any success. Awaiting call back.     Physical Exam:   GENERAL APPEARANCE:  deconditioned, elderly, frail female  T(C): 36.6 (20 @ 07:37), Max: 37.3 (20 @ 16:52)  HR: 68 (20 @ 07:37) (68 - 79)  BP: 115/65 (20 @ 07:37) (94/69 - 122/59)  RR: 16 (20 @ 07:37) (15 - 19)  SpO2: 97% (20 @ 07:37) (97% - 100%)  Wt(kg): --  HEENT:  Head is normocephalic    Skin:  Warm and dry without any rash   NECK:  Supple without lymphadenopathy.   HEART:  Regular rate and rhythm. normal S1 and S2, No M/R/G  LUNGS:  Good ins/exp effort, no W/R/R/C  ABDOMEN:  Soft, nontender, nondistended with good bowel sounds heard  EXTREMITIES:  Without cyanosis, clubbing or edema.   NEUROLOGICAL:  dementia    Labs:   CBC Full  -  ( 2020 16:38 )  WBC Count : 9.17 K/uL  RBC Count : 4.68 M/uL  Hemoglobin : 13.4 g/dL  Hematocrit : 41.0 %  Platelet Count - Automated : 187 K/uL      Urinalysis Basic - ( 2020 16:38 )    Color: Yellow / Appearance: very cloudy / S.015 / pH: x  Gluc: x / Ketone: Negative  / Bili: Negative / Urobili: Negative mg/dL   Blood: x / Protein: 100 mg/dL / Nitrite: Positive   Leuk Esterase: Moderate / RBC: 6-10 /HPF / WBC >50   Sq Epi: x / Non Sq Epi: Occasional / Bacteria: Many          134<L>  |  103  |  11  ----------------------------<  126<H>  4.2   |  25  |  1.19    Ca    9.1      2020 16:38    TPro  7.8  /  Alb  3.8  /  TBili  2.5<H>  /  DBili  x   /  AST  15  /  ALT  19  /  AlkPhos  78  -      # Syncope  - due to vaso vagal Syncope-after vomiting  - tele - no arrhythmias  - check orthos  - eval for echo    # UTI  - on Ceftriaxone  - follow cultures    # Dementia  - continue Aricept, Seroquel And Zoloft  - keep on fall and Elopement precaution    # Hypothyroidism  -on levothyroxine    Code status: Full code.

## 2020-07-18 LAB
ANION GAP SERPL CALC-SCNC: 6 MMOL/L — SIGNIFICANT CHANGE UP (ref 5–17)
BUN SERPL-MCNC: 16 MG/DL — SIGNIFICANT CHANGE UP (ref 7–23)
CALCIUM SERPL-MCNC: 8.7 MG/DL — SIGNIFICANT CHANGE UP (ref 8.5–10.1)
CHLORIDE SERPL-SCNC: 106 MMOL/L — SIGNIFICANT CHANGE UP (ref 96–108)
CO2 SERPL-SCNC: 23 MMOL/L — SIGNIFICANT CHANGE UP (ref 22–31)
CREAT SERPL-MCNC: 1.06 MG/DL — SIGNIFICANT CHANGE UP (ref 0.5–1.3)
GLUCOSE SERPL-MCNC: 98 MG/DL — SIGNIFICANT CHANGE UP (ref 70–99)
HCT VFR BLD CALC: 41.2 % — SIGNIFICANT CHANGE UP (ref 34.5–45)
HGB BLD-MCNC: 13.1 G/DL — SIGNIFICANT CHANGE UP (ref 11.5–15.5)
MCHC RBC-ENTMCNC: 28.4 PG — SIGNIFICANT CHANGE UP (ref 27–34)
MCHC RBC-ENTMCNC: 31.8 GM/DL — LOW (ref 32–36)
MCV RBC AUTO: 89.4 FL — SIGNIFICANT CHANGE UP (ref 80–100)
PLATELET # BLD AUTO: 178 K/UL — SIGNIFICANT CHANGE UP (ref 150–400)
POTASSIUM SERPL-MCNC: 4.3 MMOL/L — SIGNIFICANT CHANGE UP (ref 3.5–5.3)
POTASSIUM SERPL-SCNC: 4.3 MMOL/L — SIGNIFICANT CHANGE UP (ref 3.5–5.3)
RBC # BLD: 4.61 M/UL — SIGNIFICANT CHANGE UP (ref 3.8–5.2)
RBC # FLD: 14.3 % — SIGNIFICANT CHANGE UP (ref 10.3–14.5)
SODIUM SERPL-SCNC: 135 MMOL/L — SIGNIFICANT CHANGE UP (ref 135–145)
WBC # BLD: 6.9 K/UL — SIGNIFICANT CHANGE UP (ref 3.8–10.5)
WBC # FLD AUTO: 6.9 K/UL — SIGNIFICANT CHANGE UP (ref 3.8–10.5)

## 2020-07-18 PROCEDURE — 99233 SBSQ HOSP IP/OBS HIGH 50: CPT

## 2020-07-18 RX ORDER — SODIUM CHLORIDE 9 MG/ML
1000 INJECTION INTRAMUSCULAR; INTRAVENOUS; SUBCUTANEOUS
Refills: 0 | Status: DISCONTINUED | OUTPATIENT
Start: 2020-07-18 | End: 2020-07-19

## 2020-07-18 RX ADMIN — SERTRALINE 100 MILLIGRAM(S): 25 TABLET, FILM COATED ORAL at 09:04

## 2020-07-18 RX ADMIN — Medication 75 MICROGRAM(S): at 05:32

## 2020-07-18 RX ADMIN — QUETIAPINE FUMARATE 25 MILLIGRAM(S): 200 TABLET, FILM COATED ORAL at 22:15

## 2020-07-18 RX ADMIN — SODIUM CHLORIDE 75 MILLILITER(S): 9 INJECTION INTRAMUSCULAR; INTRAVENOUS; SUBCUTANEOUS at 17:49

## 2020-07-18 RX ADMIN — DONEPEZIL HYDROCHLORIDE 5 MILLIGRAM(S): 10 TABLET, FILM COATED ORAL at 22:15

## 2020-07-18 RX ADMIN — CEFTRIAXONE 1000 MILLIGRAM(S): 500 INJECTION, POWDER, FOR SOLUTION INTRAMUSCULAR; INTRAVENOUS at 22:15

## 2020-07-18 NOTE — PROGRESS NOTE ADULT - SUBJECTIVE AND OBJECTIVE BOX
CC: complain of loss of consciousness (17 Jul 2020 15:35)    HPI: 81 y/o Female with a PMHx of anxiety, UTIs, alzheimer's, T2DM, fibromyalgia, hypothyroidism, diverticulosis and diverticular surgery, and HLD presents BIBA to the ED from  home after syncope following vomiting after eating. As per EMS, patient was also lethargic and 100% O2 sat en route to ED. Patient denies recent falls. Patient unsure why she is in the ED. Unable to obtain complete HPI 2/2 alzheimer's and AAO x1. Chart completed via family and EMS report. Talked with daughter over phone and got the history. As per the daughter, patient was eating some cereal while sitting at the dining table area. Suddenly she vomited twice and then passed out. She came around after few minutes. She had no fall, no injury or no seizure. She had no sickness in the last few days. No fever at home.   In Ed: mildly hypotensive. Labs with hyponatremia. Trop neg. CXR neg. UA +. Was given 1L IVF bolus and Started on Ceftriaxone       INTERVAL HPI/ OVERNIGHT EVENTS: chart reviewed, Pt was seen and examined, awake but confused, unable to provide history  due to dementia. Denies  any pain     Vital Signs Last 24 Hrs  T(C): 36.8 (18 Jul 2020 15:43), Max: 36.8 (18 Jul 2020 15:43)  T(F): 98.3 (18 Jul 2020 15:43), Max: 98.3 (18 Jul 2020 15:43)  HR: 76 (18 Jul 2020 15:43) (66 - 76)  BP: 100/65 (18 Jul 2020 15:43) (100/65 - 122/54)  RR: 17 (18 Jul 2020 15:43) (16 - 17)  SpO2: 94% (18 Jul 2020 15:43) (94% - 96%)      REVIEW OF SYSTEMS:  All other review of systems is negative unless indicated above.      PHYSICAL EXAM:  General: Well developed;   in no acute distress  Eyes: PERRLA, EOMI; conjunctiva and sclera clear  Head: Normocephalic; atraumatic  ENMT: No nasal discharge; airway clear  Neck: Supple; no JVD   Respiratory: Decreased BS at bases, No wheezes, rales or rhonchi  Cardiovascular: Regular rate and rhythm. S1 and S2 Normal;  Gastrointestinal: Soft non-tender non-distended; Normal bowel sounds  Genitourinary: Some suprapubic  fulness   Extremities:  No  edema  Vascular: Peripheral pulses palpable 2+ bilaterally  Neurological: Alert and oriented x2, non focal, confused   Skin: Warm and dry. No acute rash  Musculoskeletal: Normal muscle tone, no joint tenderness   Psychiatric:  Cooperative     LAbs:                         13.1   6.90  )-----------( 178      ( 18 Jul 2020 07:36 )             41.2     18 Jul 2020 07:36    135    |  106    |  16     ----------------------------<  98     4.3     |  23     |  1.06     Ca    8.7        18 Jul 2020 07:36          MEDICATIONS  (STANDING):  cefTRIAXone Injectable. 1000 milliGRAM(s) IV Push every 24 hours  donepezil 5 milliGRAM(s) Oral at bedtime  levothyroxine 75 MICROGram(s) Oral daily  QUEtiapine 25 milliGRAM(s) Oral at bedtime  sertraline 100 milliGRAM(s) Oral daily  sodium chloride 0.9%. 500 milliLiter(s) (100 mL/Hr) IV Continuous <Continuous>  sodium chloride 0.9%. 1000 milliLiter(s) (75 mL/Hr) IV Continuous <Continuous>    MEDICATIONS  (PRN):  acetaminophen   Tablet .. 650 milliGRAM(s) Oral every 6 hours PRN Temp greater or equal to 38C (100.4F), Mild Pain (1 - 3)  ondansetron Injectable 4 milliGRAM(s) IV Push once PRN Nausea and/or Vomiting      RADIOLOGY & ADDITIONAL TESTS:    EXAM:  CT BRAIN                        PROCEDURE DATE:  07/16/2020    FINDINGS:  There is moderate diffuse parenchymal volume loss. There are areas of low attenuation in the periventricular white matter likely related to mild chronic microvascular ischemic changes.    There is no gross acute intracranial hemorrhage, parenchymal mass, mass effect or midline shift. There is no acute territorial infarct. There is no hydrocephalus.    The cranium is intact. The visualized paranasal sinuses are well-aerated.    IMPRESSION:  Limited exam due to motion    No gross acute intracranial hemorrhage or acute territorial infarct.  If symptoms persist, follow-up MRI exam recommended.      EXAM:  XR CHEST 1 VIEW                        PROCEDURE DATE:  07/16/2020        IMPRESSION: No acute finding or change.

## 2020-07-18 NOTE — PROGRESS NOTE ADULT - ASSESSMENT
81 y/o Female with a PMHx of anxiety, UTIs, alzheimer's, T2DM, fibromyalgia, hypothyroidism, diverticulosis and diverticular surgery, and HLD         1. Syncope associated with vomiting   - likely  vaso vagal  - tele: SR, no events  - EKG: SR no changes, QTc<500  - Check orthostatics  - CT head neg   - Trops neg  - ECHO: preserved EF, no significant valvular Dz   - Fall precautions   - PT     2. Abnormal UA, suspect UTI  - Pt cant provide info if symptomatic   - UCX: + GNR  -C/w  Ceftriaxone  - Monitor UP, bladder scan <150ml    3. Suspect Dehydration. Hyponatremia   -Pt has decreased PO intake and UO  -NA improved   -Monitor is and Os   -Encourage oral intake  -Will give gentle hydration     4. Dementia  -continue Aricept, Seroquel And Zoloft  -keep on fall and  precaution  - constant observation for safety       5. Hypothyroidism  -on levothyroxine    5. SCD for DVT ppx    6. Code status: Full code.       Pts daughter called for updates, left message

## 2020-07-19 LAB
-  AMIKACIN: SIGNIFICANT CHANGE UP
-  AMOXICILLIN/CLAVULANIC ACID: SIGNIFICANT CHANGE UP
-  AMPICILLIN/SULBACTAM: SIGNIFICANT CHANGE UP
-  AMPICILLIN: SIGNIFICANT CHANGE UP
-  AZTREONAM: SIGNIFICANT CHANGE UP
-  CEFAZOLIN: SIGNIFICANT CHANGE UP
-  CEFEPIME: SIGNIFICANT CHANGE UP
-  CEFOXITIN: SIGNIFICANT CHANGE UP
-  CEFTRIAXONE: SIGNIFICANT CHANGE UP
-  CIPROFLOXACIN: SIGNIFICANT CHANGE UP
-  ERTAPENEM: SIGNIFICANT CHANGE UP
-  GENTAMICIN: SIGNIFICANT CHANGE UP
-  IMIPENEM: SIGNIFICANT CHANGE UP
-  LEVOFLOXACIN: SIGNIFICANT CHANGE UP
-  MEROPENEM: SIGNIFICANT CHANGE UP
-  NITROFURANTOIN: SIGNIFICANT CHANGE UP
-  PIPERACILLIN/TAZOBACTAM: SIGNIFICANT CHANGE UP
-  TIGECYCLINE: SIGNIFICANT CHANGE UP
-  TOBRAMYCIN: SIGNIFICANT CHANGE UP
-  TRIMETHOPRIM/SULFAMETHOXAZOLE: SIGNIFICANT CHANGE UP
ANION GAP SERPL CALC-SCNC: 8 MMOL/L — SIGNIFICANT CHANGE UP (ref 5–17)
BUN SERPL-MCNC: 14 MG/DL — SIGNIFICANT CHANGE UP (ref 7–23)
CALCIUM SERPL-MCNC: 8.5 MG/DL — SIGNIFICANT CHANGE UP (ref 8.5–10.1)
CHLORIDE SERPL-SCNC: 108 MMOL/L — SIGNIFICANT CHANGE UP (ref 96–108)
CO2 SERPL-SCNC: 22 MMOL/L — SIGNIFICANT CHANGE UP (ref 22–31)
CREAT SERPL-MCNC: 0.85 MG/DL — SIGNIFICANT CHANGE UP (ref 0.5–1.3)
CULTURE RESULTS: SIGNIFICANT CHANGE UP
GLUCOSE SERPL-MCNC: 110 MG/DL — HIGH (ref 70–99)
METHOD TYPE: SIGNIFICANT CHANGE UP
ORGANISM # SPEC MICROSCOPIC CNT: SIGNIFICANT CHANGE UP
ORGANISM # SPEC MICROSCOPIC CNT: SIGNIFICANT CHANGE UP
POTASSIUM SERPL-MCNC: 3.7 MMOL/L — SIGNIFICANT CHANGE UP (ref 3.5–5.3)
POTASSIUM SERPL-SCNC: 3.7 MMOL/L — SIGNIFICANT CHANGE UP (ref 3.5–5.3)
SODIUM SERPL-SCNC: 138 MMOL/L — SIGNIFICANT CHANGE UP (ref 135–145)
SPECIMEN SOURCE: SIGNIFICANT CHANGE UP

## 2020-07-19 PROCEDURE — 99232 SBSQ HOSP IP/OBS MODERATE 35: CPT

## 2020-07-19 RX ORDER — SODIUM CHLORIDE 9 MG/ML
1000 INJECTION INTRAMUSCULAR; INTRAVENOUS; SUBCUTANEOUS
Refills: 0 | Status: COMPLETED | OUTPATIENT
Start: 2020-07-19 | End: 2020-07-19

## 2020-07-19 RX ORDER — ENOXAPARIN SODIUM 100 MG/ML
40 INJECTION SUBCUTANEOUS DAILY
Refills: 0 | Status: DISCONTINUED | OUTPATIENT
Start: 2020-07-19 | End: 2020-07-21

## 2020-07-19 RX ADMIN — SODIUM CHLORIDE 75 MILLILITER(S): 9 INJECTION INTRAMUSCULAR; INTRAVENOUS; SUBCUTANEOUS at 13:44

## 2020-07-19 RX ADMIN — CEFTRIAXONE 1000 MILLIGRAM(S): 500 INJECTION, POWDER, FOR SOLUTION INTRAMUSCULAR; INTRAVENOUS at 21:14

## 2020-07-19 RX ADMIN — SERTRALINE 100 MILLIGRAM(S): 25 TABLET, FILM COATED ORAL at 10:36

## 2020-07-19 RX ADMIN — Medication 75 MICROGRAM(S): at 05:43

## 2020-07-19 RX ADMIN — ENOXAPARIN SODIUM 40 MILLIGRAM(S): 100 INJECTION SUBCUTANEOUS at 21:13

## 2020-07-19 RX ADMIN — DONEPEZIL HYDROCHLORIDE 5 MILLIGRAM(S): 10 TABLET, FILM COATED ORAL at 21:14

## 2020-07-19 RX ADMIN — QUETIAPINE FUMARATE 25 MILLIGRAM(S): 200 TABLET, FILM COATED ORAL at 21:14

## 2020-07-19 NOTE — PROGRESS NOTE ADULT - ASSESSMENT
81 y/o Female with a PMHx of anxiety, UTIs, alzheimer's, T2DM, fibromyalgia, hypothyroidism, diverticulosis and diverticular surgery, and HLD         1. Syncope associated with vomiting   - likely  vaso vagal  - tele: SR, no events  - EKG: SR no changes, QTc<500  - Check orthostatics  - CT head neg   - Trops neg  - ECHO: preserved EF, no significant valvular Dz   - Fall precautions   - PT     2. Abnormal UA, suspect UTI  - Pt cant provide info if symptomatic   - UCX: + GNR  -C/w  Ceftriaxone  - Monitor UP, bladder scan <150ml    3. Suspect Dehydration. Hyponatremia   -Pt has decreased PO intake and UO  -NA improved   -Monitor is and Os   -Encourage oral intake  -Will give gentle hydration     4. Dementia  -continue Aricept, Seroquel And Zoloft  -keep on fall and  precaution  - constant observation for safety       5. Hypothyroidism  -on levothyroxine    5. SCD for DVT ppx    6. Code status: Full code.       Pts daughter called for updates, left message 81 y/o Female with a PMHx of anxiety, UTIs, alzheimer's, T2DM, fibromyalgia, hypothyroidism, diverticulosis and diverticular surgery, and HLD         1. Syncope associated with vomiting   - likely  vaso vagal  - tele: SR, no events  - EKG: SR no changes, QTc<500  - orthostatics borderline after IVF, repeat in am   - CT head neg   - Trops neg  - ECHO: preserved EF, no significant valvular Dz   - Fall precautions   - PT   - Will give gentle hydration as Pt has poor oral intake and  decreased UO.     2. Abnormal UA, suspect UTI  - Pt cant provide info if symptomatic   - UCX: +E.Coli, f/u sensitivity   -C/w  Ceftriaxone  - bladder scan <150ml    3. Suspect Dehydration. Hyponatremia   -Pt has decreased PO intake and UO  -NA improved   -Monitor is and Os   -Encourage oral intake  -C/w  gentle IVF  hydration     4. Dementia  -continue Aricept, Seroquel And Zoloft  -keep on fall and  precaution  - constant observation for safety       5. Hypothyroidism  -on levothyroxine    5. SCD for DVT ppx    6. Code status: Full code.       Dispo: c/w current management. Pts daughter called, updated on events. MAy d/c tele

## 2020-07-19 NOTE — PHYSICAL THERAPY INITIAL EVALUATION ADULT - DID THE PATIENT HAVE SURGERY?
Note: bedrest activity order at time of PT eval, discussed with RN manager and MD, OK to get PT OOB and to ambulate with PT.

## 2020-07-19 NOTE — PHYSICAL THERAPY INITIAL EVALUATION ADULT - IMPAIRMENTS CONTRIBUTING TO GAIT DEVIATIONS, PT EVAL
impaired coordination/impaired postural control/cognition/The pt tended to drift to the left with the RW, outside of her EMELY and making it easy to potentially bump into objects as she walks. The pt required cgx1 to min assist to maintain safety during prolonged ambulation.

## 2020-07-19 NOTE — PHYSICAL THERAPY INITIAL EVALUATION ADULT - ADDITIONAL COMMENTS
Unknown prior level of function as the pt is a poor historian and limited by Alzheimer's, the pt's dtr spoke with RN manager today and expressed the desire for the pt to participate with PT and ambulate t/o the day, stating that he pt does get up with assistance throughout her day and ambulates with family/ care taker.

## 2020-07-19 NOTE — PHYSICAL THERAPY INITIAL EVALUATION ADULT - MODALITIES TREATMENT COMMENTS
The pt demonstrated good overall activity tolerance, responding well to therex review, transfer and ambulation tx. The pt was returned to supine and adjusted for comfort in bed, bed alarm secured, CO present t/o tx, RN aware. CB, tray and phone in place. The pt was in NAD at end of tx.

## 2020-07-19 NOTE — PHYSICAL THERAPY INITIAL EVALUATION ADULT - PERTINENT HX OF CURRENT PROBLEM, REHAB EVAL
81 y/o Female with a PMHx of anxiety, UTIs, alzheimer's, T2DM, fibromyalgia, hypothyroidism, diverticulosis and diverticular surgery, and HLD presents BIBA to the ED from  home after syncope following vomiting after eating.

## 2020-07-19 NOTE — PHYSICAL THERAPY INITIAL EVALUATION ADULT - GENERAL OBSERVATIONS, REHAB EVAL
The pt was received on 3N, supine, confused and under a 1:1 CO, the pt was afraid at times but easily consoled and was agreeable to PT evaluation after pt education and encouragement.

## 2020-07-19 NOTE — PHYSICAL THERAPY INITIAL EVALUATION ADULT - LEVEL OF INDEPENDENCE, REHAB EVAL
April 12, 2019        Amelia Cristina  [address] [id number]        Dear Amelia:    Please allow me to introduce myself,  and the services available to you in collaboration with your health insurance. My name is Melanie, and I am a Nurse Navigator with [facility].      As a valuable health care resource, my goal is to help you optimize your health and well-being and create a better health care experience. I am here as a resource for you to help with answering any questions/concerns you may have about medical conditions, medications, or even helping you find an in network doctor.  I’ve included a brochure with additional information on my role and the services offered.    There is no charge to you for my services. Please know that I am an employee of [facility], and like any other health care related matters, all information and services remain strictly confidential. In some cases, individuals are referred to the Nurse Navigator by their physician, or following an inpatient or outpatient hospitalization.     Once you have a chance to review the information provided, I invite you to contact me by phone. I am available to answer your questions or discuss any concerns you may have.     Sincerely,        MAREK Metcalf, RN  Nurse Navigator, Population Health  [phone]        [place] offers online access to your health information via [email or web address] .   By registering for [facility] you will be able to view test results, pay your bill, send messages to your care team (not for immediate response), refill prescriptions, schedule and verify appointments.   minimum assist (75% patients effort)

## 2020-07-19 NOTE — PROGRESS NOTE ADULT - SUBJECTIVE AND OBJECTIVE BOX
CC: complain of loss of consciousness (17 Jul 2020 15:35)    HPI: 81 y/o Female with a PMHx of anxiety, UTIs, alzheimer's, T2DM, fibromyalgia, hypothyroidism, diverticulosis and diverticular surgery, and HLD presents BIBA to the ED from  home after syncope following vomiting after eating. As per EMS, patient was also lethargic and 100% O2 sat en route to ED. Patient denies recent falls. Patient unsure why she is in the ED. Unable to obtain complete HPI 2/2 alzheimer's and AAO x1. Chart completed via family and EMS report. Talked with daughter over phone and got the history. As per the daughter, patient was eating some cereal while sitting at the dining table area. Suddenly she vomited twice and then passed out. She came around after few minutes. She had no fall, no injury or no seizure. She had no sickness in the last few days. No fever at home.   In Ed: mildly hypotensive. Labs with hyponatremia. Trop neg. CXR neg. UA +. Was given 1L IVF bolus and Started on Ceftriaxone       INTERVAL HPI/ OVERNIGHT EVENTS: chart reviewed, Pt was seen and examined, awake but confused, unable to provide history  due to dementia. Denies  any pain     Vital Signs Last 24 Hrs  T(C): 36.7 (19 Jul 2020 08:53), Max: 36.8 (18 Jul 2020 15:43)  T(F): 98.1 (19 Jul 2020 08:53), Max: 98.3 (18 Jul 2020 15:43)  HR: 66 (19 Jul 2020 08:53) (66 - 76)  BP: 114/63 (19 Jul 2020 08:53) (100/65 - 114/63)  RR: 18 (19 Jul 2020 08:53) (17 - 18)  SpO2: 96% (19 Jul 2020 08:53) (94% - 98%)      REVIEW OF SYSTEMS:  All other review of systems is negative unless indicated above.      PHYSICAL EXAM:  General: Well developed;   in no acute distress  Eyes: PERRLA, EOMI; conjunctiva and sclera clear  Head: Normocephalic; atraumatic  ENMT: No nasal discharge; airway clear  Neck: Supple; no JVD   Respiratory: Decreased BS at bases, No wheezes, rales or rhonchi  Cardiovascular: Regular rate and rhythm. S1 and S2 Normal;  Gastrointestinal: Soft non-tender non-distended; Normal bowel sounds  Genitourinary: Some suprapubic  fulness   Extremities:  No  edema  Vascular: Peripheral pulses palpable 2+ bilaterally  Neurological: Alert and oriented x2, non focal, confused   Skin: Warm and dry. No acute rash  Musculoskeletal: Normal muscle tone, no joint tenderness   Psychiatric:  Cooperative     LABs:                           13.1   6.90  )-----------( 178      ( 18 Jul 2020 07:36 )             41.2     07-19    138  |  108  |  14  ----------------------------<  110<H>  3.7   |  22  |  0.85    Ca    8.5      19 Jul 2020 07:11                            13.1   6.90  )-----------( 178      ( 18 Jul 2020 07:36 )             41.2     18 Jul 2020 07:36    135    |  106    |  16     ----------------------------<  98     4.3     |  23     |  1.06     Ca    8.7        18 Jul 2020 07:36        MEDICATIONS  (STANDING):  cefTRIAXone Injectable. 1000 milliGRAM(s) IV Push every 24 hours  donepezil 5 milliGRAM(s) Oral at bedtime  levothyroxine 75 MICROGram(s) Oral daily  QUEtiapine 25 milliGRAM(s) Oral at bedtime  sertraline 100 milliGRAM(s) Oral daily  sodium chloride 0.9%. 500 milliLiter(s) (100 mL/Hr) IV Continuous <Continuous>  sodium chloride 0.9%. 1000 milliLiter(s) (75 mL/Hr) IV Continuous <Continuous>    MEDICATIONS  (PRN):  acetaminophen   Tablet .. 650 milliGRAM(s) Oral every 6 hours PRN Temp greater or equal to 38C (100.4F), Mild Pain (1 - 3)  ondansetron Injectable 4 milliGRAM(s) IV Push once PRN Nausea and/or Vomiting      RADIOLOGY & ADDITIONAL TESTS:    EXAM:  CT BRAIN                        PROCEDURE DATE:  07/16/2020    FINDINGS:  There is moderate diffuse parenchymal volume loss. There are areas of low attenuation in the periventricular white matter likely related to mild chronic microvascular ischemic changes.    There is no gross acute intracranial hemorrhage, parenchymal mass, mass effect or midline shift. There is no acute territorial infarct. There is no hydrocephalus.    The cranium is intact. The visualized paranasal sinuses are well-aerated.    IMPRESSION:  Limited exam due to motion    No gross acute intracranial hemorrhage or acute territorial infarct.  If symptoms persist, follow-up MRI exam recommended.      EXAM:  XR CHEST 1 VIEW                        PROCEDURE DATE:  07/16/2020        IMPRESSION: No acute finding or change. CC: complain of loss of consciousness (17 Jul 2020 15:35)    HPI: 81 y/o Female with a PMHx of anxiety, UTIs, alzheimer's, T2DM, fibromyalgia, hypothyroidism, diverticulosis and diverticular surgery, and HLD presents BIBA to the ED from  home after syncope following vomiting after eating. As per EMS, patient was also lethargic and 100% O2 sat en route to ED. Patient denies recent falls. Patient unsure why she is in the ED. Unable to obtain complete HPI 2/2 alzheimer's and AAO x1. Chart completed via family and EMS report. Talked with daughter over phone and got the history. As per the daughter, patient was eating some cereal while sitting at the dining table area. Suddenly she vomited twice and then passed out. She came around after few minutes. She had no fall, no injury or no seizure. She had no sickness in the last few days. No fever at home.   In Ed: mildly hypotensive. Labs with hyponatremia. Trop neg. CXR neg. UA +. Was given 1L IVF bolus and Started on Ceftriaxone       INTERVAL HPI/ OVERNIGHT EVENTS:  Pt was seen and examined, more alert, but still confused and cant provide history. Pt denies pain or difficulty breathing. As per Aid was sleepy yesterday and no good oral intake, but better with breakfast today.       Vital Signs Last 24 Hrs  T(C): 36.7 (19 Jul 2020 08:53), Max: 36.8 (18 Jul 2020 15:43)  T(F): 98.1 (19 Jul 2020 08:53), Max: 98.3 (18 Jul 2020 15:43)  HR: 66 (19 Jul 2020 08:53) (66 - 76)  BP: 114/63 (19 Jul 2020 08:53) (100/65 - 114/63)  RR: 18 (19 Jul 2020 08:53) (17 - 18)  SpO2: 96% (19 Jul 2020 08:53) (94% - 98%)      REVIEW OF SYSTEMS:  All other review of systems is negative unless indicated above.      PHYSICAL EXAM:  General: Well developed;   in no acute distress  Eyes: PERRLA, EOMI; conjunctiva and sclera clear  Head: Normocephalic; atraumatic  ENMT: No nasal discharge; airway clear  Neck: Supple; no JVD   Respiratory: Decreased BS at bases, No wheezes, rales or rhonchi  Cardiovascular: Regular rate and rhythm. S1 and S2 Normal;  Gastrointestinal: Soft non-tender non-distended; Normal bowel sounds  Genitourinary: Some suprapubic  fulness   Extremities:  No  edema  Vascular: Peripheral pulses palpable 2+ bilaterally  Neurological: Alert and oriented x1, non focal, confused   Skin: Warm and dry. No acute rash  Musculoskeletal: Normal muscle tone, no joint tenderness   Psychiatric:  Cooperative     LABs:                         13.1   6.90  )-----------( 178      ( 18 Jul 2020 07:36 )             41.2     07-19    138  |  108  |  14  ----------------------------<  110<H>  3.7   |  22  |  0.85    Ca    8.5      19 Jul 2020 07:11                          13.1   6.90  )-----------( 178      ( 18 Jul 2020 07:36 )             41.2     07-19    138  |  108  |  14  ----------------------------<  110<H>  3.7   |  22  |  0.85    Ca    8.5      19 Jul 2020 07:11                            13.1   6.90  )-----------( 178      ( 18 Jul 2020 07:36 )             41.2     18 Jul 2020 07:36    135    |  106    |  16     ----------------------------<  98     4.3     |  23     |  1.06     Ca    8.7        18 Jul 2020 07:36        MEDICATIONS  (STANDING):  cefTRIAXone Injectable. 1000 milliGRAM(s) IV Push every 24 hours  donepezil 5 milliGRAM(s) Oral at bedtime  levothyroxine 75 MICROGram(s) Oral daily  QUEtiapine 25 milliGRAM(s) Oral at bedtime  sertraline 100 milliGRAM(s) Oral daily  sodium chloride 0.9%. 500 milliLiter(s) (100 mL/Hr) IV Continuous <Continuous>  sodium chloride 0.9%. 1000 milliLiter(s) (75 mL/Hr) IV Continuous <Continuous>    MEDICATIONS  (PRN):  acetaminophen   Tablet .. 650 milliGRAM(s) Oral every 6 hours PRN Temp greater or equal to 38C (100.4F), Mild Pain (1 - 3)  ondansetron Injectable 4 milliGRAM(s) IV Push once PRN Nausea and/or Vomiting        RADIOLOGY & ADDITIONAL TESTS:    EXAM:  CT BRAIN                        PROCEDURE DATE:  07/16/2020    FINDINGS:  There is moderate diffuse parenchymal volume loss. There are areas of low attenuation in the periventricular white matter likely related to mild chronic microvascular ischemic changes.    There is no gross acute intracranial hemorrhage, parenchymal mass, mass effect or midline shift. There is no acute territorial infarct. There is no hydrocephalus.    The cranium is intact. The visualized paranasal sinuses are well-aerated.    IMPRESSION:  Limited exam due to motion    No gross acute intracranial hemorrhage or acute territorial infarct.  If symptoms persist, follow-up MRI exam recommended.      EXAM:  XR CHEST 1 VIEW                        PROCEDURE DATE:  07/16/2020        IMPRESSION: No acute finding or change.

## 2020-07-20 LAB
ANION GAP SERPL CALC-SCNC: 7 MMOL/L — SIGNIFICANT CHANGE UP (ref 5–17)
BUN SERPL-MCNC: 10 MG/DL — SIGNIFICANT CHANGE UP (ref 7–23)
CALCIUM SERPL-MCNC: 8.6 MG/DL — SIGNIFICANT CHANGE UP (ref 8.5–10.1)
CHLORIDE SERPL-SCNC: 111 MMOL/L — HIGH (ref 96–108)
CO2 SERPL-SCNC: 22 MMOL/L — SIGNIFICANT CHANGE UP (ref 22–31)
CREAT SERPL-MCNC: 0.74 MG/DL — SIGNIFICANT CHANGE UP (ref 0.5–1.3)
GLUCOSE SERPL-MCNC: 99 MG/DL — SIGNIFICANT CHANGE UP (ref 70–99)
POTASSIUM SERPL-MCNC: 3.7 MMOL/L — SIGNIFICANT CHANGE UP (ref 3.5–5.3)
POTASSIUM SERPL-SCNC: 3.7 MMOL/L — SIGNIFICANT CHANGE UP (ref 3.5–5.3)
SODIUM SERPL-SCNC: 140 MMOL/L — SIGNIFICANT CHANGE UP (ref 135–145)

## 2020-07-20 PROCEDURE — 99223 1ST HOSP IP/OBS HIGH 75: CPT

## 2020-07-20 PROCEDURE — 99232 SBSQ HOSP IP/OBS MODERATE 35: CPT

## 2020-07-20 RX ORDER — SODIUM CHLORIDE 9 MG/ML
1000 INJECTION INTRAMUSCULAR; INTRAVENOUS; SUBCUTANEOUS
Refills: 0 | Status: DISCONTINUED | OUTPATIENT
Start: 2020-07-20 | End: 2020-07-20

## 2020-07-20 RX ADMIN — ENOXAPARIN SODIUM 40 MILLIGRAM(S): 100 INJECTION SUBCUTANEOUS at 09:15

## 2020-07-20 RX ADMIN — SERTRALINE 100 MILLIGRAM(S): 25 TABLET, FILM COATED ORAL at 09:15

## 2020-07-20 RX ADMIN — DONEPEZIL HYDROCHLORIDE 5 MILLIGRAM(S): 10 TABLET, FILM COATED ORAL at 21:36

## 2020-07-20 RX ADMIN — SODIUM CHLORIDE 75 MILLILITER(S): 9 INJECTION INTRAMUSCULAR; INTRAVENOUS; SUBCUTANEOUS at 04:11

## 2020-07-20 RX ADMIN — QUETIAPINE FUMARATE 25 MILLIGRAM(S): 200 TABLET, FILM COATED ORAL at 21:37

## 2020-07-20 RX ADMIN — CEFTRIAXONE 1000 MILLIGRAM(S): 500 INJECTION, POWDER, FOR SOLUTION INTRAMUSCULAR; INTRAVENOUS at 21:37

## 2020-07-20 RX ADMIN — Medication 75 MICROGRAM(S): at 06:07

## 2020-07-20 NOTE — CONSULT NOTE ADULT - TREATMENT GUIDELINE COMMENT
MOLST decisions: CPR, limited, trial of intubation, send to hospital, no feeding tube, trial of IVF, determine use of abx, ok for transfusions    *Spent 43 minutes discussing GOC with family including Advance care planning, explanation and discussion of advance directives, reviewed all treatment/dispo options, and MOLST.

## 2020-07-20 NOTE — CONSULT NOTE ADULT - CONVERSATION DETAILS
Spoke with Mrs. Aguilera's /HCP Peter, daughter Myra, and 2 sons (who were listening in but not as vocal). They explained that the pt lives with her  who has been returned for the past 20 yrs and caring for her. They also have an aid that comes once a week to help give Mr. Aguilera a break and helps with some simple [facility] care. At baseline the pt has dementia, with loss of short-term memory with some sun-downing. She can still recognize her children and  but not her grandchildren. She is able to walk and mount stairs independently, needing supervision for showering and sometimes toileting, help with grooming, but otherwise is able to handle these tasks on her own. She has been doing well with the routine they have established, with her children visiting often to assist  with ensuring her happiness and safety. They know she has dementia, but were unsure of the stage and were open to review of medical issues for further clarity.     Myra shared her understanding of what was happening medically, summarizing with understanding of UTI, but unsure if AMS was due to accelerated dementia or overlying delirium (favoring the latter). We spent time clarifying medical impressions and workup thus far. Noted that due to syncope, the usual workup includes efforts to rule out most ominous causes, i.e. stroke (negative CTH), or cardiac issues (noting negative EKG, no evidence of strain on labs, and normal echo), or pulm issues (negative CXR and satting well). Went on to explain that laboratory data is also considered, especially in older population, who (as Myra mentioned) can have mental status changes due to infection. Noted labs showed +UA, with cx that followed showing E. Coli in urine. Added that labs also initially showed signs of some dehydration, which is not uncommon in setting of poor po intake from underlying, brewing illness- let them know this has normalized. Thus, agreed with Myra's impression that pt's increased AMS was likely due to UTI causing delirium atop dementia. Also took time to clarify staging of dementia, noting pt is FAST 6b, thus moderate and not quite at end stage yet (the latter being the time to consider hospice).     Went on to discuss where we go from here, noting need to elucidate both wishes (via conversation about advanced directives) and how this would help with discernment of appropriate dispo plan. They did not recall discussing advanced directives in the past, but were glad to know there was a HCP on file naming the pt's , who was the main decision-maker in this conversation either way. Explained that the HCP is supposed to represent the pt's wishes, but it is important for us to know what those wishes are. Noted that we utilize the MOLST form as a tool to help discern and organize those wishes- a process that is meant to be reviewed and revised with each new complication or return to the hospital. They agreed with continuing on with discussion and ultimately were comfortable with completing the form. We discussed all options including code status, explaining the risks of resuscitation and intubation in regard to possible outcomes and QOL. At this time, the family would like the pt to have the chance at CPR and intubation, not feeling she is at the end stages of her disease, and having the intent to change these decisions when it becomes clear that she is. We noted intent to honor this, providing information on when she WOULD be considered end stage or circumstances (recurrent infections/hospitalizations which are expected outcomes of progressive dementia) that should prompt family to reconvene/readdress this. They agreed with this and were grateful for the clarifications. Remainder of form was along the lines of limited interventions, with "no feeding tube" being the only limit set at this time. See below for list of decisions.     Used above to establish that at this time the family is not yet ready to focus completely on comfort, preferring a plan that allows for return to the hospital, and the option to discuss further interventions if needed. Added on to this PT's assessment that did not recommend [facility], but rather home with PT. They agreed that the best way for us to support the pt's potential to return to her baseline mentation and thus their established QOL, that she should be at home. We discussed all levels of home care including baseline home care RN + PT post dc, home pall + PT, and, as noted above, provided information about ultimate plan for hospice when ready. We recommended home palliative + PT based on their noted wishes and pt's needs. They agreed with this and our intent to let floor SW and case management know, who will arrange plan and equipment they are requesting.     Ultimately, plan for home pall + PT, with MOLST complete, and plan to have family follow with floor team for timing of dc when pt deemed medically stable. Shared plan to check in with pt once more tomorrow for comfort. Let them know that should they return in need of support we remain available. Shared above with RN and Dr. Tamayo.

## 2020-07-20 NOTE — CHART NOTE - NSCHARTNOTEFT_GEN_A_CORE
This SW left messages for pts  Irineo ([phone]) and pts daughter Myra ([phone]) to schedule a family meeting. Awaiting a call back. Our team will continue to follow.

## 2020-07-20 NOTE — PROGRESS NOTE ADULT - SUBJECTIVE AND OBJECTIVE BOX
CC: complain of loss of consciousness (17 Jul 2020 15:35)    HPI: 81 y/o Female with a PMHx of anxiety, UTIs, alzheimer's, T2DM, fibromyalgia, hypothyroidism, diverticulosis and diverticular surgery, and HLD presents BIBA to the ED from  home after syncope following vomiting after eating. As per EMS, patient was also lethargic and 100% O2 sat en route to ED. Patient denies recent falls. Patient unsure why she is in the ED. Unable to obtain complete HPI 2/2 alzheimer's and AAO x1. Chart completed via family and EMS report. Talked with daughter over phone and got the history. As per the daughter, patient was eating some cereal while sitting at the dining table area. Suddenly she vomited twice and then passed out. She came around after few minutes. She had no fall, no injury or no seizure. She had no sickness in the last few days. No fever at home.   In Ed: mildly hypotensive. Labs with hyponatremia. Trop neg. CXR neg. UA +. Was given 1L IVF bolus and Started on Ceftriaxone       INTERVAL HPI/ OVERNIGHT EVENTS:  Pt was seen and examined, alert, but still confused, slightly better oral intake.       Vital Signs Last 24 Hrs  T(C): 36.4 (20 Jul 2020 17:25), Max: 36.8 (20 Jul 2020 13:22)  T(F): 97.5 (20 Jul 2020 17:25), Max: 98.3 (20 Jul 2020 13:22)  HR: 74 (20 Jul 2020 17:25) (67 - 83)  BP: 116/71 (20 Jul 2020 17:25) (103/60 - 143/73)  RR: 17 (20 Jul 2020 17:25) (16 - 18)  SpO2: 98% (20 Jul 2020 17:25) (95% - 98%)      REVIEW OF SYSTEMS:  All other review of systems is negative unless indicated above.      PHYSICAL EXAM:  General: Well developed;   in no acute distress  Eyes: PERRLA, EOMI; conjunctiva and sclera clear  Head: Normocephalic; atraumatic  ENMT: No nasal discharge; airway clear  Neck: Supple; no JVD   Respiratory: Decreased BS at bases, No wheezes, rales or rhonchi  Cardiovascular: Regular rate and rhythm. S1 and S2 Normal;  Gastrointestinal: Soft non-tender non-distended; Normal bowel sounds  Genitourinary: Some suprapubic  fulness   Extremities:  No  edema  Vascular: Peripheral pulses palpable 2+ bilaterally  Neurological: Alert and oriented x1, non focal, confused   Skin: Warm and dry. No acute rash  Musculoskeletal: Normal muscle tone, no joint tenderness   Psychiatric:  Cooperative     LABs:     07-20    140  |  111<H>  |  10  ----------------------------<  99  3.7   |  22  |  0.74    Ca    8.6      20 Jul 2020 07:02                              13.1   6.90  )-----------( 178      ( 18 Jul 2020 07:36 )             41.2     07-19    138  |  108  |  14  ----------------------------<  110<H>  3.7   |  22  |  0.85    Ca    8.5      19 Jul 2020 07:11                          13.1   6.90  )-----------( 178      ( 18 Jul 2020 07:36 )             41.2     07-19    138  |  108  |  14  ----------------------------<  110<H>  3.7   |  22  |  0.85    Ca    8.5      19 Jul 2020 07:11                            13.1   6.90  )-----------( 178      ( 18 Jul 2020 07:36 )             41.2     18 Jul 2020 07:36    135    |  106    |  16     ----------------------------<  98     4.3     |  23     |  1.06     Ca    8.7        18 Jul 2020 07:36      MEDICATIONS  (STANDING):  cefTRIAXone Injectable. 1000 milliGRAM(s) IV Push every 24 hours  donepezil 5 milliGRAM(s) Oral at bedtime  enoxaparin Injectable 40 milliGRAM(s) SubCutaneous daily  levothyroxine 75 MICROGram(s) Oral daily  QUEtiapine 25 milliGRAM(s) Oral at bedtime  sertraline 100 milliGRAM(s) Oral daily    MEDICATIONS  (PRN):  acetaminophen   Tablet .. 650 milliGRAM(s) Oral every 6 hours PRN Temp greater or equal to 38C (100.4F), Mild Pain (1 - 3)  ondansetron Injectable 4 milliGRAM(s) IV Push once PRN Nausea and/or Vomiting      RADIOLOGY & ADDITIONAL TESTS:    EXAM:  CT BRAIN                        PROCEDURE DATE:  07/16/2020    FINDINGS:  There is moderate diffuse parenchymal volume loss. There are areas of low attenuation in the periventricular white matter likely related to mild chronic microvascular ischemic changes.    There is no gross acute intracranial hemorrhage, parenchymal mass, mass effect or midline shift. There is no acute territorial infarct. There is no hydrocephalus.    The cranium is intact. The visualized paranasal sinuses are well-aerated.    IMPRESSION:  Limited exam due to motion    No gross acute intracranial hemorrhage or acute territorial infarct.  If symptoms persist, follow-up MRI exam recommended.      EXAM:  XR CHEST 1 VIEW                        PROCEDURE DATE:  07/16/2020        IMPRESSION: No acute finding or change.

## 2020-07-20 NOTE — CONSULT NOTE ADULT - ASSESSMENT
80y old Female coming from home with hx of anxiety, UTIs, alzheimer's Dementia (FAST 6c, needs help with grooming), T2DM, fibromyalgia, hypothyroidism, diverticulosis and diverticular surgery, and HLD, last admitted in February. Now admitted on 7/16 after episode of eating then vomiting x2 and passing out, no trauma, as provided by family. Found here to be mildly hypotensive, with labs showing hyponatremia (resolved with IVF), Trop neg, + UA (UCx E. Coli); alongside negative CXR and CTH. Palliative Care consulted to assist with establishing GOC.     1) Syncope  - likely due to vasovagal episode  - CTH negative for acute pathology  - CXR also normal  - cardiac workup negative (nl EKG, echo, and neg trops)  - fall and aspiration precautions  - hydrated with IVF    2) UTI  - + UA on labs with UCx showing E. Coli  - on IV abx  - afebrile with normal wbc    3) Dementia/Debility  - FAST 6b (for perspective, FAST 7 is the level required for hospice eligibility)  - still able to function pretty well at home  - c/w aricept, seroquel (sundowns), and zoloft  - PPS<40%  - PT eval appreciated- walked 75ft with RW with min assist, home +PT recommended    4) Prognosis  - guarded  - in setting of moderate (advancing) dementia, hx of UTIs, pt at inc risk for further risk for complications. Not quite at end stages yet to justify hospice    5) GOC/Advanced Directives  - pt does not have capacity for decision making 2/2 to dementia  - HCP on file naming pt's  Irineo Delarosa [phone]; daughter Myra backup surrogate [phone] if Mr. Aguilera cannot be reached  - MOLST completed today: CPR, limited, trial of intubation, send to hospital, no feeding tube, trial of IVF, determine use of abx, ok for transfusions  - GOC meeting held today- see above embedded GOC note for details.     Thank you for including us in Mrs. Aguilera's care. Will continue to follow with you.    Jocelynn Adams MD  Palliative Care Attending

## 2020-07-20 NOTE — PROGRESS NOTE ADULT - ASSESSMENT
81 y/o Female with a PMHx of anxiety, UTIs, alzheimer's, T2DM, fibromyalgia, hypothyroidism, diverticulosis and diverticular surgery, and HLD         1. Syncope associated with vomiting   - likely  vaso vagal  - tele: SR, no events  - EKG: SR no changes, QTc<500  - orthostatics borderline after IVF, repeat in am   - CT head neg   - Trops neg  - ECHO: preserved EF, no significant valvular Dz   - Fall precautions   - PT   - Poor oral intake needs encouragement and assistance     2. Abnormal UA,  ECOLI UTI  - Pt cant provide info if symptomatic   - UCX: +E.Coli,  -C/w  Ceftriaxone x 5 days, will complete in am   - bladder scan <150ml    3. Suspect Dehydration. Hyponatremia.   -Pt has decreased PO intake, UO better   -NA improved   -Monitor is and Os   -Encourage oral intake  -watch off IVF   - labs in am     4. Advanced Dementia  -continue Aricept, Seroquel And Zoloft  -keep on fall   precaution  - constant observation for safety       5. Hypothyroidism  -on levothyroxine    5. DVT PPX: lovenox SQ     6. Code status: Full code.     Pt  has poor oral intake and advanced dementia, will ask for palliative eval for GOC and d/c planning     Dispo: C/w current management d/c planning

## 2020-07-20 NOTE — CONSULT NOTE ADULT - SUBJECTIVE AND OBJECTIVE BOX
HPI: Mrs. Omalley is a 80y old Female coming from home with hx of anxiety, UTIs, alzheimer's Dementia (FAST 6c, needs help with grooming), T2DM, fibromyalgia, hypothyroidism, diverticulosis and diverticular surgery, and HLD, last admitted in February. Now admitted on 7/16 after episode of eating then vomiting x2 and passing out, no trauma, as provided by family. IFound here to be mildly hypotensive, with labs showing hyponatremia (resolved with IVF), Trop neg, + UA (UCx e. coli) and CXR neg. UA +. Was given 1L IVF bolus and Started on Ceftriaxone       PAIN: ( )Yes   (x )  DYSPNEA: ( ) Yes  ( x) No    PAST MEDICAL & SURGICAL HISTORY:  Ankle fracture: s/p right- cast  Acute type 2 diabetes mellitus with manifestations: borderline- diet controlled  Fibromyalgia  Hypercholesterolemia  H/O: hypothyroidism  Diverticulosis  S/P cataract surgery, left  Diverticulitis of sigmoid colon: s/p sigmoidectomy  History of tonsillectomy  History of cholecystectomy      SOCIAL HX:    Hx opiate tolerance ( )YES  ( )NO    Baseline ADLs  (Prior to Admission)  ( ) Independent   ( )Dependent    FAMILY HISTORY:      Review of Systems:    Anxiety-  Depression-  Physical Discomfort-  Dyspnea-  Constipation-  Diarrhea-  Nausea-  Vomiting-  Anorexia-  Weight Loss-   Cough-  Secretions-  Fatigue-  Weakness-  Delirium-    All other systems reviewed and negative  Unable to obtain/Limited due to:      PHYSICAL EXAM:    Vital Signs Last 24 Hrs  T(C): 36.7 (20 Jul 2020 08:29), Max: 36.7 (19 Jul 2020 16:16)  T(F): 98.1 (20 Jul 2020 08:29), Max: 98.1 (20 Jul 2020 08:29)  HR: 67 (20 Jul 2020 08:29) (67 - 83)  BP: 143/73 (20 Jul 2020 08:29) (112/56 - 143/73)  BP(mean): --  RR: 16 (20 Jul 2020 08:29) (16 - 18)  SpO2: 98% (20 Jul 2020 08:29) (96% - 98%)  Daily     Daily     PPSV2:   %  FAST:    General:  Mental Status:  HEENT:  Lungs:  Cardiac:  GI:  :  Ext:  Neuro:      LABS:    07-20    140  |  111<H>  |  10  ----------------------------<  99  3.7   |  22  |  0.74    Ca    8.6      20 Jul 2020 07:02        Albumin: Albumin, Serum: 3.8 g/dL (07-16 @ 16:38)      Allergies    Compazine (Other)    Intolerances      MEDICATIONS  (STANDING):  cefTRIAXone Injectable. 1000 milliGRAM(s) IV Push every 24 hours  donepezil 5 milliGRAM(s) Oral at bedtime  enoxaparin Injectable 40 milliGRAM(s) SubCutaneous daily  levothyroxine 75 MICROGram(s) Oral daily  QUEtiapine 25 milliGRAM(s) Oral at bedtime  sertraline 100 milliGRAM(s) Oral daily    MEDICATIONS  (PRN):  acetaminophen   Tablet .. 650 milliGRAM(s) Oral every 6 hours PRN Temp greater or equal to 38C (100.4F), Mild Pain (1 - 3)  ondansetron Injectable 4 milliGRAM(s) IV Push once PRN Nausea and/or Vomiting      RADIOLOGY/ADDITIONAL STUDIES:    EXAM:  CT BRAIN                        PROCEDURE DATE:  07/16/2020      IMPRESSION:  Limited exam due to motion    No gross acute intracranial hemorrhage or acute territorial infarct.  If symptoms persist, follow-up MRI exam recommended.    NEYMAR DALE M.D. ATTENDING RADIOLOGIST  This document has been electronically signed. Jul 16 2020  5:25PM    EXAM:  XR CHEST 1 VIEW                        PROCEDURE DATE:  07/16/2020      IMPRESSION: No acute finding or change.    ADILIA ORELLANA M.D., ATTENDING RADIOLOGIST  This document has been electronically signed. Jul 16 2020  5:07PM    EXAM:  ECHO TTE WO CON COMP W DOPP    PROCEDURE DATE:  07/17/2020       Summary     Left ventricle endocardium was well visualized with preserved systolic   function.   Left ventricle size and structure are withinnormal limitations.   Normal left ventricular wall thickness.   Trace mitral regurgitation is present.     Signature     ----------------------------------------------------------------   Electronically signed by Regis Stack MD(Interpreting   physician) on 07/17/2020 08:21 PM   ---------------------------------------------------------------- HPI: Mrs. Omalley is a 80y old Female coming from home with hx of anxiety, UTIs, alzheimer's Dementia (FAST 6c, needs help with grooming), T2DM, fibromyalgia, hypothyroidism, diverticulosis and diverticular surgery, and HLD, last admitted in February. Now admitted on 7/16 after episode of eating then vomiting x2 and passing out, no trauma, as provided by family. Found here to be mildly hypotensive, with labs showing hyponatremia (resolved with IVF), Trop neg, + UA (UCx E. Coli); alongside negative CXR and CTH. Palliative Care consulted to assist with establishing GOC.     Met Mrs. Delarosa this am, hospital aid at bedside. Pt awake and alert, though only able to orient to herself. She also struggled with remembering her family members names, but eventually was able. She denied pain or discomfort. She was confused and holding on to her urine, needing some reassurance to urinate freely. No other issues as per team. Unable to gather more hx from her due to dementia.     GOC conversation arranged with family. See below for embedded GOC note.      PAIN: ( )Yes   (x )  DYSPNEA: ( ) Yes  ( x) No    PAST MEDICAL & SURGICAL HISTORY:  Ankle fracture: s/p right- cast  Acute type 2 diabetes mellitus with manifestations: borderline- diet controlled  Fibromyalgia  Hypercholesterolemia  H/O: hypothyroidism  Diverticulosis  S/P cataract surgery, left  Diverticulitis of sigmoid colon: s/p sigmoidectomy  History of tonsillectomy  History of cholecystectomy      SOCIAL HX: Lives with her , 3 children    Hx opiate tolerance ( )YES  ( )NO    Baseline ADLs  (Prior to Admission)- as per care management notes, pt walks, annemarie stairs independently, needs help with grooming, sometimes toileting, watching her in shower, 1x/wk aid  (x ) Independent   ( )Dependent    FAMILY HISTORY:  unable to gather 2/2 to dmentia    Review of Systems:    Unable to gather 2/2 to dementia      PHYSICAL EXAM:    Vital Signs Last 24 Hrs  T(C): 36.7 (20 Jul 2020 08:29), Max: 36.7 (19 Jul 2020 16:16)  T(F): 98.1 (20 Jul 2020 08:29), Max: 98.1 (20 Jul 2020 08:29)  HR: 67 (20 Jul 2020 08:29) (67 - 83)  BP: 143/73 (20 Jul 2020 08:29) (112/56 - 143/73)  BP(mean): --  RR: 16 (20 Jul 2020 08:29) (16 - 18)  SpO2: 98% (20 Jul 2020 08:29) (96% - 98%)  Daily     Daily     PPSV2: 30  %  FAST: 6b    General: Elderly female sitting up in bed, pleasantly confused, NAD  Mental Status: AO x 1 (self only)  HEENT: mmm, perrl, +some temporal wasting  Lungs: clear  Cardiac: + s1 s2 rrr  GI: soft nt nd +bs  : primafit in place (though having to convince her to release urine)  MSK/skin: moves all extremities, no edema, some muscle wasting in hands  Neuro: aside from confusion, no focal deficits      LABS:    07-20    140  |  111<H>  |  10  ----------------------------<  99  3.7   |  22  |  0.74    Ca    8.6      20 Jul 2020 07:02        Albumin: Albumin, Serum: 3.8 g/dL (07-16 @ 16:38)      Allergies    Compazine (Other)    Intolerances      MEDICATIONS  (STANDING):  cefTRIAXone Injectable. 1000 milliGRAM(s) IV Push every 24 hours  donepezil 5 milliGRAM(s) Oral at bedtime  enoxaparin Injectable 40 milliGRAM(s) SubCutaneous daily  levothyroxine 75 MICROGram(s) Oral daily  QUEtiapine 25 milliGRAM(s) Oral at bedtime  sertraline 100 milliGRAM(s) Oral daily    MEDICATIONS  (PRN):  acetaminophen   Tablet .. 650 milliGRAM(s) Oral every 6 hours PRN Temp greater or equal to 38C (100.4F), Mild Pain (1 - 3)  ondansetron Injectable 4 milliGRAM(s) IV Push once PRN Nausea and/or Vomiting      RADIOLOGY/ADDITIONAL STUDIES:    EXAM:  CT BRAIN                        PROCEDURE DATE:  07/16/2020      IMPRESSION:  Limited exam due to motion    No gross acute intracranial hemorrhage or acute territorial infarct.  If symptoms persist, follow-up MRI exam recommended.    NEYMAR DALE M.D. ATTENDING RADIOLOGIST  This document has been electronically signed. Jul 16 2020  5:25PM    EXAM:  XR CHEST 1 VIEW                        PROCEDURE DATE:  07/16/2020      IMPRESSION: No acute finding or change.    ADILIA ORELLANA M.D., ATTENDING RADIOLOGIST  This document has been electronically signed. Jul 16 2020  5:07PM    EXAM:  ECHO TTE WO CON COMP W DOPP    PROCEDURE DATE:  07/17/2020       Summary     Left ventricle endocardium was well visualized with preserved systolic   function.   Left ventricle size and structure are withinnormal limitations.   Normal left ventricular wall thickness.   Trace mitral regurgitation is present.     Signature     ----------------------------------------------------------------   Electronically signed by Regis Stack MD(Interpreting   physician) on 07/17/2020 08:21 PM   ----------------------------------------------------------------

## 2020-07-21 ENCOUNTER — TRANSCRIPTION ENCOUNTER (OUTPATIENT)
Age: 80
End: 2020-07-21

## 2020-07-21 VITALS
DIASTOLIC BLOOD PRESSURE: 63 MMHG | RESPIRATION RATE: 17 BRPM | SYSTOLIC BLOOD PRESSURE: 118 MMHG | OXYGEN SATURATION: 98 % | HEART RATE: 65 BPM | TEMPERATURE: 98 F

## 2020-07-21 PROCEDURE — 99233 SBSQ HOSP IP/OBS HIGH 50: CPT

## 2020-07-21 PROCEDURE — 99239 HOSP IP/OBS DSCHRG MGMT >30: CPT

## 2020-07-21 RX ADMIN — ENOXAPARIN SODIUM 40 MILLIGRAM(S): 100 INJECTION SUBCUTANEOUS at 09:56

## 2020-07-21 RX ADMIN — SERTRALINE 100 MILLIGRAM(S): 25 TABLET, FILM COATED ORAL at 09:55

## 2020-07-21 RX ADMIN — Medication 75 MICROGRAM(S): at 05:40

## 2020-07-21 NOTE — PROGRESS NOTE ADULT - SUBJECTIVE AND OBJECTIVE BOX
HPI: Pt seen and examined this am in follow up for sx, 1:1 at bedside. Pt calm and comfortable this am. She denies pain or any discomfort. She remains pleasantly confused, only able to orient to herself. No issues as per 1:1.       PAIN: denies  DYSPNEA: denies      ROS:  All other systems reviewed and negative      PHYSICAL EXAM:    Vital Signs Last 24 Hrs  T(C): 36.6 (21 Jul 2020 08:31), Max: 36.8 (20 Jul 2020 13:22)  T(F): 97.8 (21 Jul 2020 08:31), Max: 98.3 (20 Jul 2020 13:22)  HR: 65 (21 Jul 2020 08:31) (65 - 74)  BP: 118/63 (21 Jul 2020 08:31) (103/60 - 118/63)  RR: 17 (21 Jul 2020 08:31) (17 - 18)  SpO2: 98% (21 Jul 2020 08:31) (95% - 98%)  Daily     Daily     PPSV2: 30  %  FAST: 6b    General: Elderly female laying in bed, pleasantly confused, NAD  Mental Status: AO x 1 (self only)  HEENT: mmm, perrl, +some temporal wasting  Lungs: clear  Cardiac: + s1 s2 rrr  GI: soft nt nd +bs  : primafit in place  MSK/skin: moves all extremities, no edema, some muscle wasting in hands  Neuro: aside from confusion, no focal deficits    LABS:    07-20    140  |  111<H>  |  10  ----------------------------<  99  3.7   |  22  |  0.74    Ca    8.6      20 Jul 2020 07:02        Albumin: Albumin, Serum: 3.8 g/dL (07-16 @ 16:38)      Allergies    Compazine (Other)    Intolerances      MEDICATIONS  (STANDING):  cefTRIAXone Injectable. 1000 milliGRAM(s) IV Push every 24 hours  donepezil 5 milliGRAM(s) Oral at bedtime  enoxaparin Injectable 40 milliGRAM(s) SubCutaneous daily  levothyroxine 75 MICROGram(s) Oral daily  QUEtiapine 25 milliGRAM(s) Oral at bedtime  sertraline 100 milliGRAM(s) Oral daily    MEDICATIONS  (PRN):  acetaminophen   Tablet .. 650 milliGRAM(s) Oral every 6 hours PRN Temp greater or equal to 38C (100.4F), Mild Pain (1 - 3)  ondansetron Injectable 4 milliGRAM(s) IV Push once PRN Nausea and/or Vomiting      RADIOLOGY:

## 2020-07-21 NOTE — DISCHARGE NOTE NURSING/CASE MANAGEMENT/SOCIAL WORK - PATIENT PORTAL LINK FT
You can access the FollowMyHealth Patient Portal offered by [facility] by registering at the following website: [email or web address]. By joining [facility]’s FollowMyHealth portal, you will also be able to view your health information using other applications (apps) compatible with our system.

## 2020-07-21 NOTE — DISCHARGE NOTE PROVIDER - NSDCMRMEDTOKEN_GEN_ALL_CORE_FT
donepezil 5 mg oral tablet: 1 tab(s) orally once a day (at bedtime)  levothyroxine 75 mcg (0.075 mg) oral tablet: 1 tab(s) orally once a day  QUEtiapine 25 mg oral tablet: 1 tab(s) orally once a day (at bedtime)  sertraline 100 mg oral tablet: 1 tab(s) orally once a day

## 2020-07-21 NOTE — DISCHARGE NOTE PROVIDER - HOSPITAL COURSE
81 y/o Female with a PMHx of anxiety, UTIs, alzheimer's, T2DM, fibromyalgia, hypothyroidism, diverticulosis and diverticular surgery, and HLD presents BIBA to the ED from  home after syncope following vomiting after eating. As per EMS, patient was also lethargic and 100% O2 sat en route to ED. Patient denies recent falls. Patient unsure why she is in the ED. Unable to obtain complete HPI 2/2 alzheimer's and AAO x1. Chart completed via family and EMS report. Talked with daughter over phone and got the history. As per the daughter, patient was eating some cereal while sitting at the dining table area. Suddenly she vomited twice and then passed out. She came around after few minutes. She had no fall, no injury or no seizure. She had no sickness in the last few days. No fever at home.     In Ed: mildly hypotensive. Labs with hyponatremia. Trop neg. CXR neg. UA +. Was given 1L IVF bolus and Started on Ceftriaxone.    Pt was admitted for further evaluation, ACS ruled out> Was found to have + orthostatics, s/p IVF with improvement. Also  her mental status improved and now more alert. Oral intake decreased, but better after mental status improved with TX. Pt was evaluated by palliative team and plan to D/c home with palliative home care    Today Pt feels looks well more alert and answering some questions appropriately, reports no complains.             Vital Signs Last 24 Hrs    T(C): 36.6 (21 Jul 2020 08:31), Max: 36.6 (21 Jul 2020 08:31)    T(F): 97.8 (21 Jul 2020 08:31), Max: 97.8 (21 Jul 2020 08:31)    HR: 65 (21 Jul 2020 08:31) (65 - 74)    BP: 118/63 (21 Jul 2020 08:31) (116/71 - 118/63)    RR: 17 (21 Jul 2020 08:31) (17 - 17)    SpO2: 98% (21 Jul 2020 08:31) (98% - 98%)        PHYSICAL EXAM:    General: Well developed;   in no acute distress    Eyes: PERRLA, EOMI; conjunctiva and sclera clear    Head: Normocephalic; atraumatic    ENMT: No nasal discharge; airway clear    Neck: Supple; no JVD     Respiratory: Decreased BS at bases, No wheezes, rales or rhonchi    Cardiovascular: Regular rate and rhythm. S1 and S2 Normal;    Gastrointestinal: Soft non-tender non-distended; Normal bowel sounds    Genitourinary: Some suprapubic  fulness     Extremities:  No  edema    Vascular: Peripheral pulses palpable 2+ bilaterally    Neurological: Alert and oriented x1, non focal, confused     Skin: Warm and dry. No acute rash    Musculoskeletal: Normal muscle tone, no joint tenderness     Psychiatric:  Cooperative             81 y/o Female with a PMHx of anxiety, UTIs, alzheimer's, T2DM, fibromyalgia, hypothyroidism, diverticulosis and diverticular surgery, and HLD             1. Syncope associated with vomiting     - likely  vaso vagal in settings of orthostasis     - tele: SR, no events    - EKG: SR no changes, QTc<500    - orthostatics improved after IVF    - CT head neg     - Trops neg    - ECHO: preserved EF, no significant valvular Dz     - PT     - Poor oral intake needs encouragement and assistance         2. Abnormal UA,  ECOLI UTI    - Pt cant provide info if symptomatic     - UCX: +E.Coli,    -S/p   Ceftriaxone x 5 days    - bladder scan <150ml        3. Dehydration. Hyponatremia.     -Pt has decreased PO intake, UO better     -NA improved     -Encourage oral intake, needs assistance         4. Advanced Dementia    -continue Aricept, Seroquel And Zoloft    -keep on fall   precaution            5. Hypothyroidism    -on levothyroxine        5. DVT PPX: lovenox SQ         6. Code status: Full code.       Pt was evaluated by palliative team, GOC discussed and MOLST updated.         Dispo: stable for D/c home with palliative HC.             Addendum: Pt will need Hospital bed as requires frequent repositioning not feasible in regular bed. Pt will also require gel overlay due to altered mental status due to Dementia        Total time 45 min     Fax d/c summary to PCP

## 2020-07-21 NOTE — DISCHARGE NOTE PROVIDER - NSDCCPCAREPLAN_GEN_ALL_CORE_FT
PRINCIPAL DISCHARGE DIAGNOSIS  Diagnosis: Syncope, unspecified syncope type  Assessment and Plan of Treatment: due to orthostatic hypotension   needs reminders and help with oral hydration and feedings      SECONDARY DISCHARGE DIAGNOSES  Diagnosis: E-coli UTI  Assessment and Plan of Treatment: Completed  Abxs treatment x 5 days    Diagnosis: Dementia  Assessment and Plan of Treatment: fall precautions  C/w current medications

## 2020-07-21 NOTE — PROGRESS NOTE ADULT - REASON FOR ADMISSION
complain of loss of consciousness

## 2020-07-21 NOTE — PROGRESS NOTE ADULT - ASSESSMENT
80y old Female coming from home with hx of anxiety, UTIs, alzheimer's Dementia (FAST 6c, needs help with grooming), T2DM, fibromyalgia, hypothyroidism, diverticulosis and diverticular surgery, and HLD, last admitted in February. Now admitted on 7/16 after episode of eating then vomiting x2 and passing out, no trauma, as provided by family. Found here to be mildly hypotensive, with labs showing hyponatremia (resolved with IVF), Trop neg, + UA (UCx E. Coli); alongside negative CXR and CTH. Palliative Care consulted to assist with establishing GOC.     1) Syncope  - likely due to vasovagal episode  - CTH negative for acute pathology  - CXR also normal  - cardiac workup negative (nl EKG, echo, and neg trops)  - fall and aspiration precautions  - hydrated with IVF    2) UTI  - + UA on labs with UCx showing E. Coli  - on IV abx-->now transitioned to po  - afebrile with normal wbc    3) Dementia/Debility  - FAST 6b (for perspective, FAST 7 is the level required for hospice eligibility)  - still able to function pretty well at home  - c/w aricept, seroquel (sundowns), and zoloft  - PPS<40%  - PT eval appreciated- walked 75ft with RW with min assist, home +PT recommended    4) Prognosis  - guarded  - in setting of moderate (advancing) dementia, hx of UTIs, pt at inc risk for further risk for complications. Not quite at end stages yet to justify hospice    5) GOC/Advanced Directives  - pt does not have capacity for decision making 2/2 to dementia  - HCP on file naming pt's  Irineo Delarosa [phone]; daughter Myra backup surrogate [phone] if Mr. Aguilera cannot be reached  - MOLST completed 7/20: CPR, limited, trial of intubation, send to hospital, no feeding tube, trial of IVF, determine use of abx, ok for transfusions  - GOC meeting held 7/20- MOLST completed, plan for home with home pall when deemed medically stable. Otherwise, for further details please see GOC note embedded in consult note.     Thank you for including us in Mrs. Aguilera's care. Will continue to follow with you.    Jocelynn Adams MD  Palliative Care Attending

## 2020-07-22 LAB
CULTURE RESULTS: SIGNIFICANT CHANGE UP
CULTURE RESULTS: SIGNIFICANT CHANGE UP
SPECIMEN SOURCE: SIGNIFICANT CHANGE UP
SPECIMEN SOURCE: SIGNIFICANT CHANGE UP

## 2020-07-24 DIAGNOSIS — E11.9 TYPE 2 DIABETES MELLITUS WITHOUT COMPLICATIONS: ICD-10-CM

## 2020-07-24 DIAGNOSIS — E87.1 HYPO-OSMOLALITY AND HYPONATREMIA: ICD-10-CM

## 2020-07-24 DIAGNOSIS — E78.00 PURE HYPERCHOLESTEROLEMIA, UNSPECIFIED: ICD-10-CM

## 2020-07-24 DIAGNOSIS — Z98.42 CATARACT EXTRACTION STATUS, LEFT EYE: ICD-10-CM

## 2020-07-24 DIAGNOSIS — E03.9 HYPOTHYROIDISM, UNSPECIFIED: ICD-10-CM

## 2020-07-24 DIAGNOSIS — I95.1 ORTHOSTATIC HYPOTENSION: ICD-10-CM

## 2020-07-24 DIAGNOSIS — F41.9 ANXIETY DISORDER, UNSPECIFIED: ICD-10-CM

## 2020-07-24 DIAGNOSIS — G30.9 ALZHEIMER'S DISEASE, UNSPECIFIED: ICD-10-CM

## 2020-07-24 DIAGNOSIS — Z98.890 OTHER SPECIFIED POSTPROCEDURAL STATES: ICD-10-CM

## 2020-07-24 DIAGNOSIS — M79.7 FIBROMYALGIA: ICD-10-CM

## 2020-07-24 DIAGNOSIS — Z88.8 ALLERGY STATUS TO OTHER DRUGS, MEDICAMENTS AND BIOLOGICAL SUBSTANCES: ICD-10-CM

## 2020-07-24 DIAGNOSIS — F02.80 DEMENTIA IN OTHER DISEASES CLASSIFIED ELSEWHERE, UNSPECIFIED SEVERITY, WITHOUT BEHAVIORAL DISTURBANCE, PSYCHOTIC DISTURBANCE, MOOD DISTURBANCE, AND ANXIETY: ICD-10-CM

## 2020-07-24 DIAGNOSIS — Z87.440 PERSONAL HISTORY OF URINARY (TRACT) INFECTIONS: ICD-10-CM

## 2020-07-24 DIAGNOSIS — Z79.899 OTHER LONG TERM (CURRENT) DRUG THERAPY: ICD-10-CM

## 2020-07-24 DIAGNOSIS — E86.0 DEHYDRATION: ICD-10-CM

## 2020-07-24 DIAGNOSIS — B96.20 UNSPECIFIED ESCHERICHIA COLI [E. COLI] AS THE CAUSE OF DISEASES CLASSIFIED ELSEWHERE: ICD-10-CM

## 2020-07-24 DIAGNOSIS — N39.0 URINARY TRACT INFECTION, SITE NOT SPECIFIED: ICD-10-CM

## 2020-10-26 NOTE — PATIENT PROFILE ADULT - FLU SEASON?
Form filled out and signed and placed in outgoing fax bin.    Jorden Escalante MD  Family Medicine PGY-2  10/26/20       No

## 2021-04-07 ENCOUNTER — EMERGENCY (EMERGENCY)
Facility: HOSPITAL | Age: 81
LOS: 0 days | Discharge: ANOTHER HOSPITAL | End: 2021-04-08
Attending: EMERGENCY MEDICINE
Payer: MEDICARE

## 2021-04-07 VITALS — WEIGHT: 125 LBS | HEIGHT: 66 IN

## 2021-04-07 DIAGNOSIS — Z90.49 ACQUIRED ABSENCE OF OTHER SPECIFIED PARTS OF DIGESTIVE TRACT: ICD-10-CM

## 2021-04-07 DIAGNOSIS — E78.00 PURE HYPERCHOLESTEROLEMIA, UNSPECIFIED: ICD-10-CM

## 2021-04-07 DIAGNOSIS — E03.9 HYPOTHYROIDISM, UNSPECIFIED: ICD-10-CM

## 2021-04-07 DIAGNOSIS — M79.7 FIBROMYALGIA: ICD-10-CM

## 2021-04-07 DIAGNOSIS — Z88.8 ALLERGY STATUS TO OTHER DRUGS, MEDICAMENTS AND BIOLOGICAL SUBSTANCES STATUS: ICD-10-CM

## 2021-04-07 DIAGNOSIS — Z20.822 CONTACT WITH AND (SUSPECTED) EXPOSURE TO COVID-19: ICD-10-CM

## 2021-04-07 DIAGNOSIS — Z87.440 PERSONAL HISTORY OF URINARY (TRACT) INFECTIONS: ICD-10-CM

## 2021-04-07 DIAGNOSIS — Z87.19 PERSONAL HISTORY OF OTHER DISEASES OF THE DIGESTIVE SYSTEM: ICD-10-CM

## 2021-04-07 DIAGNOSIS — R79.89 OTHER SPECIFIED ABNORMAL FINDINGS OF BLOOD CHEMISTRY: ICD-10-CM

## 2021-04-07 DIAGNOSIS — F02.80 DEMENTIA IN OTHER DISEASES CLASSIFIED ELSEWHERE, UNSPECIFIED SEVERITY, WITHOUT BEHAVIORAL DISTURBANCE, PSYCHOTIC DISTURBANCE, MOOD DISTURBANCE, AND ANXIETY: ICD-10-CM

## 2021-04-07 DIAGNOSIS — R22.0 LOCALIZED SWELLING, MASS AND LUMP, HEAD: ICD-10-CM

## 2021-04-07 DIAGNOSIS — E11.9 TYPE 2 DIABETES MELLITUS WITHOUT COMPLICATIONS: ICD-10-CM

## 2021-04-07 DIAGNOSIS — K11.20 SIALOADENITIS, UNSPECIFIED: ICD-10-CM

## 2021-04-07 DIAGNOSIS — G30.9 ALZHEIMER'S DISEASE, UNSPECIFIED: ICD-10-CM

## 2021-04-07 DIAGNOSIS — Z98.42 CATARACT EXTRACTION STATUS, LEFT EYE: Chronic | ICD-10-CM

## 2021-04-07 LAB
APPEARANCE UR: ABNORMAL
APTT BLD: 29.4 SEC — SIGNIFICANT CHANGE UP (ref 27.5–35.5)
BASOPHILS # BLD AUTO: 0 K/UL — SIGNIFICANT CHANGE UP (ref 0–0.2)
BASOPHILS NFR BLD AUTO: 0 % — SIGNIFICANT CHANGE UP (ref 0–2)
BILIRUB UR-MCNC: NEGATIVE — SIGNIFICANT CHANGE UP
COLOR SPEC: ABNORMAL
DIFF PNL FLD: ABNORMAL
EOSINOPHIL # BLD AUTO: 0 K/UL — SIGNIFICANT CHANGE UP (ref 0–0.5)
EOSINOPHIL NFR BLD AUTO: 0 % — SIGNIFICANT CHANGE UP (ref 0–6)
GLUCOSE UR QL: NEGATIVE MG/DL — SIGNIFICANT CHANGE UP
HCT VFR BLD CALC: 51 % — HIGH (ref 34.5–45)
HGB BLD-MCNC: 16.6 G/DL — HIGH (ref 11.5–15.5)
INR BLD: 1.19 RATIO — HIGH (ref 0.88–1.16)
KETONES UR-MCNC: NEGATIVE — SIGNIFICANT CHANGE UP
LACTATE SERPL-SCNC: 1.2 MMOL/L — SIGNIFICANT CHANGE UP (ref 0.7–2)
LACTATE SERPL-SCNC: 2.3 MMOL/L — HIGH (ref 0.7–2)
LACTATE SERPL-SCNC: 3.5 MMOL/L — HIGH (ref 0.7–2)
LEUKOCYTE ESTERASE UR-ACNC: ABNORMAL
LYMPHOCYTES # BLD AUTO: 1.15 K/UL — SIGNIFICANT CHANGE UP (ref 1–3.3)
LYMPHOCYTES # BLD AUTO: 5 % — LOW (ref 13–44)
MCHC RBC-ENTMCNC: 29.3 PG — SIGNIFICANT CHANGE UP (ref 27–34)
MCHC RBC-ENTMCNC: 32.5 GM/DL — SIGNIFICANT CHANGE UP (ref 32–36)
MCV RBC AUTO: 89.9 FL — SIGNIFICANT CHANGE UP (ref 80–100)
MONOCYTES # BLD AUTO: 1.38 K/UL — HIGH (ref 0–0.9)
MONOCYTES NFR BLD AUTO: 6 % — SIGNIFICANT CHANGE UP (ref 2–14)
NEUTROPHILS # BLD AUTO: 20.53 K/UL — HIGH (ref 1.8–7.4)
NEUTROPHILS NFR BLD AUTO: 89 % — HIGH (ref 43–77)
NITRITE UR-MCNC: POSITIVE
NRBC # BLD: SIGNIFICANT CHANGE UP /100 WBCS (ref 0–0)
PH UR: 5 — SIGNIFICANT CHANGE UP (ref 5–8)
PLATELET # BLD AUTO: 335 K/UL — SIGNIFICANT CHANGE UP (ref 150–400)
PROT UR-MCNC: 30 MG/DL
PROTHROM AB SERPL-ACNC: 13.8 SEC — HIGH (ref 10.6–13.6)
RBC # BLD: 5.67 M/UL — HIGH (ref 3.8–5.2)
RBC # FLD: 14.9 % — HIGH (ref 10.3–14.5)
SARS-COV-2 RNA SPEC QL NAA+PROBE: SIGNIFICANT CHANGE UP
SP GR SPEC: 1.02 — SIGNIFICANT CHANGE UP (ref 1.01–1.02)
UROBILINOGEN FLD QL: NEGATIVE MG/DL — SIGNIFICANT CHANGE UP
WBC # BLD: 23.07 K/UL — HIGH (ref 3.8–10.5)
WBC # FLD AUTO: 23.07 K/UL — HIGH (ref 3.8–10.5)

## 2021-04-07 PROCEDURE — 96361 HYDRATE IV INFUSION ADD-ON: CPT | Mod: XU

## 2021-04-07 PROCEDURE — 83605 ASSAY OF LACTIC ACID: CPT

## 2021-04-07 PROCEDURE — 87086 URINE CULTURE/COLONY COUNT: CPT

## 2021-04-07 PROCEDURE — 71045 X-RAY EXAM CHEST 1 VIEW: CPT

## 2021-04-07 PROCEDURE — 85025 COMPLETE CBC W/AUTO DIFF WBC: CPT

## 2021-04-07 PROCEDURE — U0003: CPT

## 2021-04-07 PROCEDURE — 87186 SC STD MICRODIL/AGAR DIL: CPT

## 2021-04-07 PROCEDURE — 93010 ELECTROCARDIOGRAM REPORT: CPT

## 2021-04-07 PROCEDURE — 85610 PROTHROMBIN TIME: CPT

## 2021-04-07 PROCEDURE — 96365 THER/PROPH/DIAG IV INF INIT: CPT | Mod: XU

## 2021-04-07 PROCEDURE — 99285 EMERGENCY DEPT VISIT HI MDM: CPT | Mod: 25

## 2021-04-07 PROCEDURE — 87040 BLOOD CULTURE FOR BACTERIA: CPT | Mod: 59

## 2021-04-07 PROCEDURE — 85730 THROMBOPLASTIN TIME PARTIAL: CPT

## 2021-04-07 PROCEDURE — 70491 CT SOFT TISSUE NECK W/DYE: CPT

## 2021-04-07 PROCEDURE — 36415 COLL VENOUS BLD VENIPUNCTURE: CPT

## 2021-04-07 PROCEDURE — 81001 URINALYSIS AUTO W/SCOPE: CPT

## 2021-04-07 PROCEDURE — 71045 X-RAY EXAM CHEST 1 VIEW: CPT | Mod: 26

## 2021-04-07 PROCEDURE — 99284 EMERGENCY DEPT VISIT MOD MDM: CPT | Mod: 25

## 2021-04-07 PROCEDURE — 87150 DNA/RNA AMPLIFIED PROBE: CPT

## 2021-04-07 PROCEDURE — 93005 ELECTROCARDIOGRAM TRACING: CPT

## 2021-04-07 PROCEDURE — 70491 CT SOFT TISSUE NECK W/DYE: CPT | Mod: 26

## 2021-04-07 PROCEDURE — U0005: CPT

## 2021-04-07 PROCEDURE — 80053 COMPREHEN METABOLIC PANEL: CPT

## 2021-04-07 PROCEDURE — 96375 TX/PRO/DX INJ NEW DRUG ADDON: CPT | Mod: XU

## 2021-04-07 PROCEDURE — 99285 EMERGENCY DEPT VISIT HI MDM: CPT | Mod: CS

## 2021-04-07 RX ORDER — SODIUM CHLORIDE 9 MG/ML
1750 INJECTION INTRAMUSCULAR; INTRAVENOUS; SUBCUTANEOUS ONCE
Refills: 0 | Status: COMPLETED | OUTPATIENT
Start: 2021-04-07 | End: 2021-04-07

## 2021-04-07 RX ORDER — QUETIAPINE FUMARATE 200 MG/1
1 TABLET, FILM COATED ORAL
Qty: 0 | Refills: 0 | DISCHARGE

## 2021-04-07 RX ORDER — DONEPEZIL HYDROCHLORIDE 10 MG/1
1 TABLET, FILM COATED ORAL
Qty: 0 | Refills: 0 | DISCHARGE

## 2021-04-07 RX ORDER — SODIUM CHLORIDE 9 MG/ML
1000 INJECTION INTRAMUSCULAR; INTRAVENOUS; SUBCUTANEOUS ONCE
Refills: 0 | Status: COMPLETED | OUTPATIENT
Start: 2021-04-07 | End: 2021-04-07

## 2021-04-07 RX ORDER — CEFTRIAXONE 500 MG/1
1000 INJECTION, POWDER, FOR SOLUTION INTRAMUSCULAR; INTRAVENOUS ONCE
Refills: 0 | Status: DISCONTINUED | OUTPATIENT
Start: 2021-04-07 | End: 2021-04-07

## 2021-04-07 RX ORDER — CEFTRIAXONE 500 MG/1
1000 INJECTION, POWDER, FOR SOLUTION INTRAMUSCULAR; INTRAVENOUS ONCE
Refills: 0 | Status: COMPLETED | OUTPATIENT
Start: 2021-04-07 | End: 2021-04-07

## 2021-04-07 RX ADMIN — SODIUM CHLORIDE 1750 MILLILITER(S): 9 INJECTION INTRAMUSCULAR; INTRAVENOUS; SUBCUTANEOUS at 16:11

## 2021-04-07 RX ADMIN — Medication 900 MILLIGRAM(S): at 16:41

## 2021-04-07 RX ADMIN — Medication 100 MILLIGRAM(S): at 16:11

## 2021-04-07 RX ADMIN — SODIUM CHLORIDE 1750 MILLILITER(S): 9 INJECTION INTRAMUSCULAR; INTRAVENOUS; SUBCUTANEOUS at 18:00

## 2021-04-07 RX ADMIN — SODIUM CHLORIDE 1000 MILLILITER(S): 9 INJECTION INTRAMUSCULAR; INTRAVENOUS; SUBCUTANEOUS at 20:45

## 2021-04-07 RX ADMIN — CEFTRIAXONE 1000 MILLIGRAM(S): 500 INJECTION, POWDER, FOR SOLUTION INTRAMUSCULAR; INTRAVENOUS at 17:34

## 2021-04-07 NOTE — ED STATDOCS - PROGRESS NOTE DETAILS
Nicholas Castellanos for attending Dr. Martinez: HPI: 79 y/o female with a PMHx of anxiety, UTIs, alzheimer's, T2DM, fibromyalgia, hypothyroidism, diverticulosis and diverticular surgery, and HLD presents to the ED c/o right sided facial swelling starting yesterday. Denies vomiting. Allergies: Compazine. Will send pt to main ED for further evaluation. Nicholas Castellanos for attending Dr. Martinez: HPI: 81 y/o female with a PMHx of anxiety, UTIs, alzheimer's, T2DM, fibromyalgia, hypothyroidism, diverticulosis and diverticular surgery, and HLD presents to the ED c/o right sided facial swelling starting yesterday. Denies vomiting. Allergies: Compazine. Concern for dental abscess vs Ludwigs. Pt with hx of dementia, AA&Ox1 and needs assistance while walking. Will send pt to main ED for further evaluation. Nicholas Castellanos for attending Dr. Martinez: HPI: 79 y/o female with a PMHx of anxiety, UTIs, alzheimer's, T2DM, fibromyalgia, hypothyroidism, diverticulosis and diverticular surgery, and HLD presents to the ED c/o right sided facial swelling starting yesterday. Denies vomiting. . Concern for dental abscess vs Ludwigs. Pt with hx of dementia, AA&Ox1 and needs assistance while walking. Will send pt to main ED for further evaluation.

## 2021-04-07 NOTE — ED ADULT TRIAGE NOTE - CHIEF COMPLAINT QUOTE
Pt. to the Ed C/O Right Sided facial Swelling that is traveling down to the neck- Denies recent dental work- Hx. of Alzheimer's and Thyroid

## 2021-04-07 NOTE — ED PROVIDER NOTE - CLINICAL SUMMARY MEDICAL DECISION MAKING FREE TEXT BOX
Does not appear to be consistent with Vin's angina, will consider sialolithiasis vs odontogenic abscess. Dispo pending imaging and reassessment. Dose with Clindamycin, anticipate admission. Does not appear to be consistent with Vin's angina, will consider sialolithiasis vs sialoadenitis vs odontogenic abscess. Dispo pending imaging and reassessment. Dose with Clindamycin, anticipate admission.

## 2021-04-07 NOTE — ED ADULT NURSE NOTE - NSIMPLEMENTINTERV_GEN_ALL_ED
Implemented All Fall Risk Interventions:  [place] to call system. Call bell, personal items and telephone within reach. Instruct patient to call for assistance. Room bathroom lighting operational. Non-slip footwear when patient is off stretcher. Physically safe environment: no spills, clutter or unnecessary equipment. Stretcher in lowest position, wheels locked, appropriate side rails in place. Provide visual cue, wrist band, yellow gown, etc. Monitor gait and stability. Monitor for mental status changes and reorient to person, place, and time. Review medications for side effects contributing to fall risk. Reinforce activity limits and safety measures with patient and family.

## 2021-04-07 NOTE — ED PROVIDER NOTE - ENMT, MLM
+Left submandibular mass, TTP, overlying erythema, no fluctuance. Airway patent, Nasal mucosa clear. Mouth with normal mucosa. Throat has no vesicles, no oropharyngeal exudates and uvula is midline.

## 2021-04-07 NOTE — ED PROVIDER NOTE - PROGRESS NOTE DETAILS
Pt with leukocytosis and rising lactate.  Bolused fluids and dosed with abx.  CT reveals heterogeneous mass which appears infections/inflammatory in nature.  Most likely sialoadenitis.  Attempted admission here to , but told that without ENT availability patient would need transfer.  Discussed with ENT team at [facility] and patient accepted for transfer to hospitalist service.  Discussed this with  and family and got verbal consent for transfer.  Further care per OSH.

## 2021-04-07 NOTE — ED ADULT NURSE NOTE - OBJECTIVE STATEMENT
pt BIB  d/t Right Sided facial Swelling that is traveling down to the neck- Denies recent dental work- Hx. of Alzheimer's and Thyroid. pt A&0x0.

## 2021-04-07 NOTE — ED STATDOCS - OBJECTIVE STATEMENT
Pertinent HPI/PMH/PSH/FHx/SHx and Review of Systems contained within  HPI: 79 y/o female p/w right sided facial swelling.   PMH/PSH relevant for: anxiety, UTIs, alzheimer's, T2DM, fibromyalgia, hypothyroidism, diverticulosis and diverticular surgery, and HLD  ROS negative for: fever, Chest pain, SOB, Nausea, vomiting, diarrhea, abdominal pain, dysuria    FamilyHx and SocialHx not otherwise contributory See progress note.

## 2021-04-07 NOTE — ED PROVIDER NOTE - OBJECTIVE STATEMENT
79 y/o female with a PMHx of anxiety, UTIs, Alzheimer's disease, DMII, fibromyalgia, hypothyroidism, diverticulosis and diverticular surgery, HLD, presents to the ED c/o increasing left-sided jaw swelling x1.5 days. No fever, no chills. Pt is otherwise at baseline mental status of minimally verbal as per family. Pt with some pain with swallowing, had a tele-health appointment and was sent to the ED for further evaluation and to make sure pt's airway was not compromised. Complete history unobtainable secondary to pt minimally verbal at baseline, partial Hx obtained via family members.

## 2021-04-07 NOTE — ED PROVIDER NOTE - UNABLE TO OBTAIN
Dementia Complete history unobtainable secondary to pt minimally verbal at baseline. ROS unobtainable secondary to pt minimally verbal at baseline.

## 2021-04-08 ENCOUNTER — INPATIENT (INPATIENT)
Facility: HOSPITAL | Age: 81
LOS: 4 days | Discharge: NURSING, REHAB OR LONG-TERM CARE | End: 2021-04-13
Attending: STUDENT IN AN ORGANIZED HEALTH CARE EDUCATION/TRAINING PROGRAM | Admitting: STUDENT IN AN ORGANIZED HEALTH CARE EDUCATION/TRAINING PROGRAM
Payer: MEDICARE

## 2021-04-08 VITALS
OXYGEN SATURATION: 99 % | HEART RATE: 79 BPM | RESPIRATION RATE: 17 BRPM | TEMPERATURE: 98 F | DIASTOLIC BLOOD PRESSURE: 55 MMHG | SYSTOLIC BLOOD PRESSURE: 93 MMHG

## 2021-04-08 VITALS
HEART RATE: 78 BPM | TEMPERATURE: 98 F | RESPIRATION RATE: 16 BRPM | DIASTOLIC BLOOD PRESSURE: 57 MMHG | OXYGEN SATURATION: 100 % | SYSTOLIC BLOOD PRESSURE: 95 MMHG

## 2021-04-08 DIAGNOSIS — N39.0 URINARY TRACT INFECTION, SITE NOT SPECIFIED: ICD-10-CM

## 2021-04-08 DIAGNOSIS — E03.9 HYPOTHYROIDISM, UNSPECIFIED: ICD-10-CM

## 2021-04-08 DIAGNOSIS — K12.2 CELLULITIS AND ABSCESS OF MOUTH: ICD-10-CM

## 2021-04-08 DIAGNOSIS — Z29.9 ENCOUNTER FOR PROPHYLACTIC MEASURES, UNSPECIFIED: ICD-10-CM

## 2021-04-08 DIAGNOSIS — G30.9 ALZHEIMER'S DISEASE, UNSPECIFIED: ICD-10-CM

## 2021-04-08 DIAGNOSIS — Z98.42 CATARACT EXTRACTION STATUS, LEFT EYE: Chronic | ICD-10-CM

## 2021-04-08 LAB
ALBUMIN SERPL ELPH-MCNC: 3.1 G/DL — LOW (ref 3.3–5)
ALP SERPL-CCNC: 59 U/L — SIGNIFICANT CHANGE UP (ref 40–120)
ALT FLD-CCNC: 13 U/L — SIGNIFICANT CHANGE UP (ref 4–33)
ANION GAP SERPL CALC-SCNC: 10 MMOL/L — SIGNIFICANT CHANGE UP (ref 7–14)
AST SERPL-CCNC: 11 U/L — SIGNIFICANT CHANGE UP (ref 4–32)
BASOPHILS # BLD AUTO: 0.04 K/UL — SIGNIFICANT CHANGE UP (ref 0–0.2)
BASOPHILS NFR BLD AUTO: 0.2 % — SIGNIFICANT CHANGE UP (ref 0–2)
BILIRUB SERPL-MCNC: 0.9 MG/DL — SIGNIFICANT CHANGE UP (ref 0.2–1.2)
BLOOD GAS VENOUS COMPREHENSIVE RESULT: SIGNIFICANT CHANGE UP
BUN SERPL-MCNC: 19 MG/DL — SIGNIFICANT CHANGE UP (ref 7–23)
CALCIUM SERPL-MCNC: 8.5 MG/DL — SIGNIFICANT CHANGE UP (ref 8.4–10.5)
CHLORIDE SERPL-SCNC: 112 MMOL/L — HIGH (ref 98–107)
CO2 SERPL-SCNC: 23 MMOL/L — SIGNIFICANT CHANGE UP (ref 22–31)
CREAT SERPL-MCNC: 0.92 MG/DL — SIGNIFICANT CHANGE UP (ref 0.5–1.3)
EOSINOPHIL # BLD AUTO: 0.01 K/UL — SIGNIFICANT CHANGE UP (ref 0–0.5)
EOSINOPHIL NFR BLD AUTO: 0.1 % — SIGNIFICANT CHANGE UP (ref 0–6)
GLUCOSE SERPL-MCNC: 103 MG/DL — HIGH (ref 70–99)
GRAM STN FLD: SIGNIFICANT CHANGE UP
HCT VFR BLD CALC: 40 % — SIGNIFICANT CHANGE UP (ref 34.5–45)
HGB BLD-MCNC: 12.4 G/DL — SIGNIFICANT CHANGE UP (ref 11.5–15.5)
IANC: 13.73 K/UL — HIGH (ref 1.5–8.5)
IMM GRANULOCYTES NFR BLD AUTO: 0.7 % — SIGNIFICANT CHANGE UP (ref 0–1.5)
LYMPHOCYTES # BLD AUTO: 12.2 % — LOW (ref 13–44)
LYMPHOCYTES # BLD AUTO: 2.11 K/UL — SIGNIFICANT CHANGE UP (ref 1–3.3)
MCHC RBC-ENTMCNC: 28.8 PG — SIGNIFICANT CHANGE UP (ref 27–34)
MCHC RBC-ENTMCNC: 31 GM/DL — LOW (ref 32–36)
MCV RBC AUTO: 93 FL — SIGNIFICANT CHANGE UP (ref 80–100)
METHOD TYPE: SIGNIFICANT CHANGE UP
MONOCYTES # BLD AUTO: 1.24 K/UL — HIGH (ref 0–0.9)
MONOCYTES NFR BLD AUTO: 7.2 % — SIGNIFICANT CHANGE UP (ref 2–14)
MSSA DNA SPEC QL NAA+PROBE: SIGNIFICANT CHANGE UP
NEUTROPHILS # BLD AUTO: 13.73 K/UL — HIGH (ref 1.8–7.4)
NEUTROPHILS NFR BLD AUTO: 79.6 % — HIGH (ref 43–77)
NRBC # BLD: 0 /100 WBCS — SIGNIFICANT CHANGE UP
NRBC # FLD: 0 K/UL — SIGNIFICANT CHANGE UP
PLATELET # BLD AUTO: 225 K/UL — SIGNIFICANT CHANGE UP (ref 150–400)
POTASSIUM SERPL-MCNC: 3.6 MMOL/L — SIGNIFICANT CHANGE UP (ref 3.5–5.3)
POTASSIUM SERPL-SCNC: 3.6 MMOL/L — SIGNIFICANT CHANGE UP (ref 3.5–5.3)
PROT SERPL-MCNC: 6.3 G/DL — SIGNIFICANT CHANGE UP (ref 6–8.3)
RBC # BLD: 4.3 M/UL — SIGNIFICANT CHANGE UP (ref 3.8–5.2)
RBC # FLD: 15.1 % — HIGH (ref 10.3–14.5)
SODIUM SERPL-SCNC: 145 MMOL/L — SIGNIFICANT CHANGE UP (ref 135–145)
SPECIMEN SOURCE: SIGNIFICANT CHANGE UP
WBC # BLD: 17.25 K/UL — HIGH (ref 3.8–10.5)
WBC # FLD AUTO: 17.25 K/UL — HIGH (ref 3.8–10.5)

## 2021-04-08 PROCEDURE — 99223 1ST HOSP IP/OBS HIGH 75: CPT

## 2021-04-08 PROCEDURE — 99285 EMERGENCY DEPT VISIT HI MDM: CPT | Mod: CS

## 2021-04-08 PROCEDURE — 99231 SBSQ HOSP IP/OBS SF/LOW 25: CPT

## 2021-04-08 RX ORDER — ACETAMINOPHEN 500 MG
650 TABLET ORAL EVERY 6 HOURS
Refills: 0 | Status: DISCONTINUED | OUTPATIENT
Start: 2021-04-08 | End: 2021-04-13

## 2021-04-08 RX ORDER — LEVOTHYROXINE SODIUM 125 MCG
88 TABLET ORAL DAILY
Refills: 0 | Status: DISCONTINUED | OUTPATIENT
Start: 2021-04-08 | End: 2021-04-13

## 2021-04-08 RX ORDER — VANCOMYCIN HCL 1 G
1000 VIAL (EA) INTRAVENOUS EVERY 24 HOURS
Refills: 0 | Status: DISCONTINUED | OUTPATIENT
Start: 2021-04-08 | End: 2021-04-09

## 2021-04-08 RX ORDER — LEVOTHYROXINE SODIUM 125 MCG
75 TABLET ORAL DAILY
Refills: 0 | Status: DISCONTINUED | OUTPATIENT
Start: 2021-04-08 | End: 2021-04-08

## 2021-04-08 RX ORDER — QUETIAPINE FUMARATE 200 MG/1
25 TABLET, FILM COATED ORAL AT BEDTIME
Refills: 0 | Status: DISCONTINUED | OUTPATIENT
Start: 2021-04-08 | End: 2021-04-13

## 2021-04-08 RX ORDER — SODIUM CHLORIDE 9 MG/ML
1000 INJECTION INTRAMUSCULAR; INTRAVENOUS; SUBCUTANEOUS ONCE
Refills: 0 | Status: COMPLETED | OUTPATIENT
Start: 2021-04-08 | End: 2021-04-08

## 2021-04-08 RX ORDER — SERTRALINE 25 MG/1
100 TABLET, FILM COATED ORAL DAILY
Refills: 0 | Status: DISCONTINUED | OUTPATIENT
Start: 2021-04-08 | End: 2021-04-13

## 2021-04-08 RX ORDER — AMPICILLIN SODIUM AND SULBACTAM SODIUM 250; 125 MG/ML; MG/ML
3 INJECTION, POWDER, FOR SUSPENSION INTRAMUSCULAR; INTRAVENOUS ONCE
Refills: 0 | Status: COMPLETED | OUTPATIENT
Start: 2021-04-08 | End: 2021-04-08

## 2021-04-08 RX ORDER — AMPICILLIN SODIUM AND SULBACTAM SODIUM 250; 125 MG/ML; MG/ML
INJECTION, POWDER, FOR SUSPENSION INTRAMUSCULAR; INTRAVENOUS
Refills: 0 | Status: DISCONTINUED | OUTPATIENT
Start: 2021-04-08 | End: 2021-04-12

## 2021-04-08 RX ORDER — ENOXAPARIN SODIUM 100 MG/ML
30 INJECTION SUBCUTANEOUS DAILY
Refills: 0 | Status: DISCONTINUED | OUTPATIENT
Start: 2021-04-08 | End: 2021-04-13

## 2021-04-08 RX ORDER — AMPICILLIN SODIUM AND SULBACTAM SODIUM 250; 125 MG/ML; MG/ML
3 INJECTION, POWDER, FOR SUSPENSION INTRAMUSCULAR; INTRAVENOUS EVERY 6 HOURS
Refills: 0 | Status: DISCONTINUED | OUTPATIENT
Start: 2021-04-08 | End: 2021-04-12

## 2021-04-08 RX ADMIN — Medication 250 MILLIGRAM(S): at 23:21

## 2021-04-08 RX ADMIN — Medication 100 MILLIGRAM(S): at 03:54

## 2021-04-08 RX ADMIN — SERTRALINE 100 MILLIGRAM(S): 25 TABLET, FILM COATED ORAL at 15:15

## 2021-04-08 RX ADMIN — AMPICILLIN SODIUM AND SULBACTAM SODIUM 200 GRAM(S): 250; 125 INJECTION, POWDER, FOR SUSPENSION INTRAMUSCULAR; INTRAVENOUS at 15:15

## 2021-04-08 RX ADMIN — SODIUM CHLORIDE 1000 MILLILITER(S): 9 INJECTION INTRAMUSCULAR; INTRAVENOUS; SUBCUTANEOUS at 05:20

## 2021-04-08 RX ADMIN — SODIUM CHLORIDE 1000 MILLILITER(S): 9 INJECTION INTRAMUSCULAR; INTRAVENOUS; SUBCUTANEOUS at 03:53

## 2021-04-08 RX ADMIN — Medication 900 MILLIGRAM(S): at 05:20

## 2021-04-08 RX ADMIN — AMPICILLIN SODIUM AND SULBACTAM SODIUM 200 GRAM(S): 250; 125 INJECTION, POWDER, FOR SUSPENSION INTRAMUSCULAR; INTRAVENOUS at 21:14

## 2021-04-08 NOTE — SWALLOW BEDSIDE ASSESSMENT ADULT - MODE OF PRESENTATION
spoon/fed by clinician hand-over-hand assistance/cup hand-over-hand assistance with liquids/spoon/fed by clinician

## 2021-04-08 NOTE — H&P ADULT - PROBLEM SELECTOR PLAN 4
Hx of Alzheimers, A&Ox0 at baseline. Not on any meds per family.  -Seroquel 25mg prn sundown/agitation  -1:1 for patient safety Hx of Alzheimers, A&Ox0 at baseline. Not on any meds per family.  -Seroquel 25mg prn sundown/agitation  -Place in enhanced supervision room

## 2021-04-08 NOTE — ED PROVIDER NOTE - PHYSICAL EXAMINATION
GENERAL: non-toxic appearing, in NAD  HEAD: atraumatic, normocephalic  EYES: vision grossly intact, no conjunctivitis or discharge  EARS: hearing grossly intact  NOSE: no nasal discharge, epistaxis   NECK: localized area of erythema, swelling, ttp under left mandible, no tongue elevation (limited exam d/t poor patient cooperation)  CARDIAC: RRR, normal S1S2,  no appreciable murmurs, no cyanosis, cap refill < 2 seconds  PULM: no respiratory distress, oxygen saturation on RA wnl, CTAB, no crackles, rales, rhonchi, or wheezing  GI: abdomen nondistended, soft, nontender, no guarding or rebound tenderness, no palpable masses  NEURO: awake and alert x 0, normal speech, PERRLA, EOMI, no focal motor or sensory deficits  MSK: spine appears normal, no joint swelling or erythema, no gross deformities of extremities  EXT: no peripheral edema, calf tenderness, redness or swelling  SKIN: warm, dry, and intact, no rashes  PSYCH: appropriate mood and affect

## 2021-04-08 NOTE — H&P ADULT - PROBLEM SELECTOR PLAN 3
Patient with hx of UTIs, per discussion with family patient w evidence of UTI at [facility].  -Obtain OSH records  -Continue unasyn as above, sufficient for  bugs Patient with hx of UTIs, per discussion with family patient w evidence of UTI at [facility].  -F/U Urine cx (under alternate MRN)  -Continue unasyn as above, sufficient for  bugs Patient with hx of frequent UTIs. Grossly positive UA with TNTC bacteria at [place]  -F/U Urine cx (under alternate MRN)  -Continue unasyn as above, sufficient for  bugs

## 2021-04-08 NOTE — ED PROVIDER NOTE - OBJECTIVE STATEMENT
80F with hx of anxiety, UTIs, Alzheimer's disease (AxO x 0), DMII, fibromyalgia, hypothyroidism, diverticulosis and diverticular surgery, HLD transfer from [place] for left submandibular infection. Patient had swelling in her jaw for 1.5 days. Labs showing 23, lactate 3.5->1.2, CT neck showing left submandibular inflammatory/infection. Given 2.75L IVF, 1g ceftriaxone, clindamycin 900 mg. Transferred to [facility] for ENT.

## 2021-04-08 NOTE — H&P ADULT - NSHPLABSRESULTS_GEN_ALL_CORE
04-08    145  |  112<H>  |  19  ----------------------------<  103<H>  3.6   |  23  |  0.92    Ca    8.5      08 Apr 2021 04:22    TPro  6.3  /  Alb  3.1<L>  /  TBili  0.9  /  DBili  x   /  AST  11  /  ALT  13  /  AlkPhos  59  04-08 04-08    145  |  112<H>  |  19  ----------------------------<  103<H>  3.6   |  23  |  0.92    Ca    8.5      08 Apr 2021 04:22    TPro  6.3  /  Alb  3.1<L>  /  TBili  0.9  /  DBili  x   /  AST  11  /  ALT  13  /  AlkPhos  59  04-08      Blood Gas Profile - Venous (04.08.21 @ 04:22)   pH, Venous: 7.34   pCO2, Venous: 48 mmHg   pO2, Venous: <24 mmHg   HCO3, Venous: 23 mmol/L   Base Excess, Venous: 0.2 mmol/L   Oxygen Saturation, Venous: 35.2 %

## 2021-04-08 NOTE — ED PROVIDER NOTE - ATTENDING CONTRIBUTION TO CARE
HPI: 80F with hx of anxiety, UTIs, Alzheimer's disease (AxO x 0), DMII, fibromyalgia, hypothyroidism, diverticulosis and diverticular surgery, HLD transfer from [place] for left submandibular infection. Patient had swelling in her jaw for 1.5 days per family. Labs showing 23, lactate 3.5->1.2, CT neck showing left submandibular inflammatory/infection. Given 2.75L IVF, 1g ceftriaxone, clindamycin 900 mg. Transferred to [facility] for ENT.  EXAM: NAD, speaking full sentences, AOX0, no resp distress, not drooling and able to tolerate secretions, left neck mass tenderness to palpation, firm, indurated with erythema but no open wounds, no stridor or bruit, thyroid is midline, lungs ctab.   MDM: pt with multiple medical problems that was transferred from [facility] for left ant neck mass to be seen by ENT here. Pt airway and Gi tract is not compromised at this time. Phonating well but demented. Will Consult ENT and likely admit medicine for monitoring and treatment.

## 2021-04-08 NOTE — ED POST DISCHARGE NOTE - RESULT SUMMARY
Anaerobic bottle gram positive cocci in clusters.  Pt. transferred to [facility].   [facility] CARD contacted and is aware.  Rm BRODERICK

## 2021-04-08 NOTE — H&P ADULT - ASSESSMENT
81yo female with history of Alzheimers (A&Ox0 at baseline), hypothyroidism, DMII (not on any medications) transferred from [facility] for L. submandibular fullness with concern for sialdentitis.

## 2021-04-08 NOTE — CONSULT NOTE ADULT - SUBJECTIVE AND OBJECTIVE BOX
HPI: 80F with PMH anxiety, UTI, Alzheimer's (A&Ox0), DMII, hypothyrodisim who presents for L submandibular fullness and edema. Patient presented to [place] ED for L sided neck swelling x1.5 days. Patient is nonverbal, minimally communicative, limited hx able to be obtained.       CT from HNT reviewed with e/o L SMG inflammation, c/w sialadenitis       T(C): 36.6 (04-08-21 @ 03:34), Max: 36.8 (04-08-21 @ 01:16)  HR: 76 (04-08-21 @ 03:34) (76 - 78)  BP: 112/53 (04-08-21 @ 03:34) (95/57 - 112/53)  RR: 16 (04-08-21 @ 03:34) (16 - 16)  SpO2: 100% (04-08-21 @ 03:34) (100% - 100%)  A&Ox0  No acute distress                            12.4   17.25 )-----------( 225      ( 08 Apr 2021 04:22 )             40.0   04-08    145  |  112<H>  |  19  ----------------------------<  103<H>  3.6   |  23  |  0.92    Ca    8.5      08 Apr 2021 04:22    TPro  6.3  /  Alb  3.1<L>  /  TBili  0.9  /  DBili  x   /  AST  11  /  ALT  13  /  AlkPhos  59  04-08    A/P: 81yo F with limited mental status presents for L SMG sialadenitis, likely in the setting of dehydration.   - continue abx, recommend unasyn vs clinda to cover for staph/strep  - massage QID following warm compresses to the L SMG gland  - sialogogues if able to tolerate  - hydration - IV vs PO per primary team  - ENT will follow  - will discuss with Dr. Saleem HPI: 80F with PMH anxiety, UTI, Alzheimer's (A&Ox0), DMII, hypothyrodisim who presents for L submandibular fullness and edema. Patient presented to [place] ED for L sided neck swelling x1.5 days. Patient is not oriented, minimally communicative, limited hx able to be obtained.     CT from HNT reviewed with e/o L SMG inflammation, c/w sialadenitis     T(C): 36.6 (04-08-21 @ 03:34), Max: 36.8 (04-08-21 @ 01:16)  HR: 76 (04-08-21 @ 03:34) (76 - 78)  BP: 112/53 (04-08-21 @ 03:34) (95/57 - 112/53)  RR: 16 (04-08-21 @ 03:34) (16 - 16)  SpO2: 100% (04-08-21 @ 03:34) (100% - 100%)  A&Ox0  No acute distress  Agitated with physical exam  L sided neck swelling with overlying erythema, indurated, extremely ttp  Unable to fully visualize OC, mucosa dry, no e/o purulent drainage, unable to evaluate FOM for expression of purulence d/t severe ttp and limited cooperation with exam, no FOM edema, OP wnl                          12.4   17.25 )-----------( 225      ( 08 Apr 2021 04:22 )             40.0   04-08    145  |  112<H>  |  19  ----------------------------<  103<H>  3.6   |  23  |  0.92    Ca    8.5      08 Apr 2021 04:22    TPro  6.3  /  Alb  3.1<L>  /  TBili  0.9  /  DBili  x   /  AST  11  /  ALT  13  /  AlkPhos  59  04-08    A/P: 79yo F with limited mental status presents for L SMG sialadenitis, likely in the setting of dehydration.   - continue abx, recommend unasyn vs clinda to cover for staph/strep  - massage QID following warm compresses to the L SMG gland  - unable to obtain culture at bedside, if patient becomes more cooperative with exam, please obtain culture of secretions at [facility] sialogogues if able to tolerate  - hydration - IV vs PO per primary team  - ENT will follow  - will discuss with Dr. Saleem

## 2021-04-08 NOTE — SWALLOW BEDSIDE ASSESSMENT ADULT - COMMENTS
H&P 4/8/21: 79yo female with history of Alzheimers (A&Ox0 at baseline), hypothyroidism, DMII (not on any medications) transferred from [facility] for L. submandibular fullness with concern for sialdentitis.    SLP received patient in bed, awake, alert, yet pleasantly confused. Patient unable to answer questions and follow directions. RN informed SLP that patient has mandibular pain on left side. Patient benefitted from hand-over-hand assistance during PO trials of honey thick liquids, nectar thick liquids, and thin liquids. Given moderate deficits for thin liquids, recommending nectar thick liquids.

## 2021-04-08 NOTE — H&P ADULT - HISTORY OF PRESENT ILLNESS
79yo female with history of Alzheimers (A&Ox0 at baseline), hypothyroidism, DMII (not on any medications) transferred from [facility] for L. submandibular fullness with concern for sialadenitis. Patient w 2 day history of L. facial swelling. Presented to [place] 4/7, transferred to [facility] for ENT eval. Patient with longstanding Alzheimers, ambulates with assistance at home, needs help with all ADLs by family. Eats a regular diet with ensures, enjoys chocolate sweets. No longer on any Alzheimer's medications as family reported they were not helping her case. She is incontinent of urine and feces at baseline. She rarely makes her wants known.   81yo female with history of Alzheimers (A&Ox0 at baseline), hypothyroidism, DMII (not on any medications) transferred from [facility] for L. submandibular fullness with concern for sialadenitis. History obtained from chart (alternate MRN [id number]) and family (spouse Irineo, daughter Candance). Patient w 2 day history of L. facial swelling. Presented to [place] 4/7, found to have large L. submandibular gland swelling on CT Head/Neck and transferred to [facility] for ENT eval. Patient with longstanding Alzheimers, ambulates with assistance at home, needs help with all ADLs by family. Eats a regular diet with ensures, enjoys chocolate sweets. No longer on any Alzheimer's medications as family reported they were not helping. She is incontinent of urine and feces at baseline. She rarely makes her wants known. Patient with grossly positive UA at [place], denies symptoms but unreliable historian.    ED/[facility] Course:  ENT eval. Given Clindamycin x1.  79yo female with history of Alzheimers (A&Ox0 at baseline), hypothyroidism, DMII (not on any medications) transferred from [facility] for L. submandibular fullness with concern for sialadenitis. History obtained from chart (alternate MRN [id number]) and family (spouse Irineo, daughter Candance). Patient w 2 day history of L. facial swelling. Presented to [place] 4/7, found to have large L. submandibular gland swelling on CT Head/Neck and transferred to [facility] for ENT eval. Patient with longstanding Alzheimers, ambulates with assistance at home, needs help with all ADLs by family. Eats a regular diet with ensures, enjoys chocolate sweets. No longer on any Alzheimer's medications as family reported they were not helping. She is incontinent of urine and feces at baseline. She rarely makes her wants known. Patient with grossly positive UA at [place], denies symptoms but unreliable historian.    ED/[facility] Course:  ENT eval. Given Clindamycin x2 (one dose here one at OSH). Blood and urine cxs obtained at [place]. Given 1g Ctx. 1L NS bolus.

## 2021-04-08 NOTE — H&P ADULT - NSHPSOCIALHISTORY_GEN_ALL_CORE
Lives at home with . Requires assitance with all ADLs and iADLs. Incontinent of urine and stool at baseline.

## 2021-04-08 NOTE — H&P ADULT - ATTENDING COMMENTS
80W history of Alzheimers (A&Ox0 at baseline), hypothyroidism, DMII (not on any medications) transferred from [facility] for L. submandibular fullness with concern for sialadenitis.    Pt alert and oriented x 0 on exam. L submandibular erythema and fullness.    Sepsis (hypotension and leukocytosis) present on admission due to possible submandibular space infection and acute UTI: start Augmentin IV, no acute surgical intervention per ENT. Continue warm compresses, manual massage of gland, sialogogues. f/u UCx. Will f/u ENT if and when repeat imaging is needed given possible underlying tumor per CT read.    Dementia with behavioral disturbances: seroquel at bedtime    Hypothyroid: c/w synthroid    Team discussed with Family

## 2021-04-08 NOTE — H&P ADULT - PROBLEM SELECTOR PLAN 1
Patient transferred from [facility] for ENT evaluation of left sided sialadentitis.  -Per ENT no acute surgical intervention  -Continue warm compresses, manual massage of gland, sialogogues  -ENT following, appreciate assistance  -Start unasyn 3g q6h  -MRSA swab Patient transferred from [facility] for ENT evaluation of left sided sialadentitis.  -Per ENT no acute surgical intervention  -Continue warm compresses, manual massage of gland, sialogogues  -ENT following, appreciate assistance  -Start unasyn 3g q6h  -MRSA swab  -Tylenol q6h for pain control Patient transferred from [facility] for ENT evaluation of left sided sialadentitis.  -Per ENT no acute surgical intervention  -Continue warm compresses, manual massage of gland, sialogogues  -ENT following, appreciate assistance  -Start unasyn 3g q6h  -MRSA swab  -Tylenol q6h for pain control  -F/U Blood cx (under alternate MRN) Patient transferred from [facility] for ENT evaluation of left sided sialadentitis vs concern for underlying tumor  -Per ENT no acute surgical intervention  -Continue warm compresses, manual massage of gland, sialogogues  -ENT following, appreciate assistance  -Start unasyn 3g q6h  -MRSA swab  -Tylenol q6h for pain control  -F/U Blood cx (under alternate MRN)

## 2021-04-08 NOTE — ED ADULT NURSE NOTE - OBJECTIVE STATEMENT
A&OXO non ambulatory female presents as a transfer from [facility] for left lower neck mass. patient baseline mental status as per family. patient skin clean dry and intact. 20g iv in place in left hand and 20g iv placed in left forearm from outside hospital.

## 2021-04-08 NOTE — PROGRESS NOTE ADULT - PROBLEM SELECTOR PLAN 1
1. Continue with Abx as per primary team, consider ID consult  2. Trend CBC  3. IVF/oral hydration  4. Pain control  5. MASH gland qid by Nursing staff, ENT will follow up daily to [facility] as well.  6. Warm compress qid to gland

## 2021-04-08 NOTE — ED PROVIDER NOTE - CLINICAL SUMMARY MEDICAL DECISION MAKING FREE TEXT BOX
80F with hx of anxiety, UTIs, Alzheimer's disease (AxO x 0), DMII, fibromyalgia, hypothyroidism, diverticulosis and diverticular surgery, HLD transfer from [place] for left submandibular infection. On exam, VS wnl, afebrile, protecting airway, no drooling, phonating well, localized area of erythema, swelling, ttp under left mandible, no tongue elevation (limited exam d/t poor patient cooperation). Will recheck labs, give another dose of clinda, ENT consulted, TBA medicine.

## 2021-04-08 NOTE — SWALLOW BEDSIDE ASSESSMENT ADULT - CONSISTENCIES ADMINISTERED
x3, x2/alana/corina soft x3, x3/honey thick/nectar thick x3/thin liquid x5/thin liquid x3; x5; x3, x2/honey thick/nectar thick/puree/mech soft

## 2021-04-08 NOTE — H&P ADULT - NSHPPHYSICALEXAM_GEN_ALL_CORE
VITALS:   T(C): 36.1 (04-08-21 @ 06:09), Max: 36.8 (04-08-21 @ 01:16)  HR: 70 (04-08-21 @ 06:09) (70 - 78)  BP: 100/56 (04-08-21 @ 06:09) (95/57 - 112/53)  RR: 18 (04-08-21 @ 06:09) (16 - 18)  SpO2: 98% (04-08-21 @ 06:09) (98% - 100%)    GENERAL: NAD, lying in bed comfortably  HEAD:  Atraumatic, Normocephalic  EYES: EOMI, PERRLA, conjunctiva and sclera clear  ENT: L. submandibular swelling with erythma, TTP  NECK: Supple, No JVD  CHEST/LUNG: Clear to auscultation bilaterally; No rales, rhonchi, wheezing, or rubs. Unlabored respirations  HEART: Regular rate and rhythm; No murmurs, rubs, or gallops  ABDOMEN: BSx4; Soft, nontender, nondistended  EXTREMITIES:  2+ Peripheral Pulses, brisk capillary refill. No clubbing, cyanosis, or edema  NERVOUS SYSTEM:  A&Ox0, no focal deficits  SKIN: No rashes or lesions

## 2021-04-08 NOTE — ED ADULT NURSE NOTE - NSIMPLEMENTINTERV_GEN_ALL_ED
Implemented All Fall with Harm Risk Interventions:  [place] to call system. Call bell, personal items and telephone within reach. Instruct patient to call for assistance. Room bathroom lighting operational. Non-slip footwear when patient is off stretcher. Physically safe environment: no spills, clutter or unnecessary equipment. Stretcher in lowest position, wheels locked, appropriate side rails in place. Provide visual cue, wrist band, yellow gown, etc. Monitor gait and stability. Monitor for mental status changes and reorient to person, place, and time. Review medications for side effects contributing to fall risk. Reinforce activity limits and safety measures with patient and family. Provide visual clues: red socks.

## 2021-04-08 NOTE — SWALLOW BEDSIDE ASSESSMENT ADULT - ORAL PHASE
bolus hold/delayed anterior to posterior transport Within functional limits delayed anterior to posterior transport suspect premature spillage

## 2021-04-08 NOTE — SWALLOW BEDSIDE ASSESSMENT ADULT - SWALLOW EVAL: DIAGNOSIS
Patient consumed PO trials of puree, honey thick liquids, nectar thick liquids, thin liquids, and mechanical soft solids. Patient presents with a moderate oral phase dysphagia. Oral phase characterized by adequate acceptance, adequate utensil stripping, prolonged manipulation, bolus hold/delayed anterior to posterior transport, adequate oral cavity clearance. Functional pharyngeal phase characterized by initiation of pharyngeal swallow and hyolaryngeal elevation and excursion noted upon palpation. No clinically overt signs or symptoms of laryngeal penetration/aspiration. Patient consumed PO trials of puree, honey thick liquids, nectar thick liquids, thin liquids, and mechanical soft solids. Patient presents with a moderate oral phase dysphagia. Oral phase characterized by adequate acceptance, adequate utensil stripping, prolonged manipulation, bolus hold/delayed anterior to posterior transport, suspect premature with thin liquids, adequate oral cavity clearance. Pharyngeal phase characterized by suspect delayed initiation of pharyngeal swallow with thin liquids and hyolaryngeal elevation and excursion noted upon palpation. No clinically overt signs or symptoms of laryngeal penetration/aspiration.  Given aforementioned deficits with thin liquids, recommending nectar thick liquids.

## 2021-04-08 NOTE — PROGRESS NOTE ADULT - SUBJECTIVE AND OBJECTIVE BOX
ENT following for left sialadenitis, Patient not responding to questions. In pain to light palpation.     AVSS, P02 98% R/A  HEENT:  Head:   + left SMG swelling, + erythema, + tenderness to palp.   Not able to visualize any pus being expressed patient not openning mouth.   Mouth: crusting oral lips.

## 2021-04-08 NOTE — ED ADULT TRIAGE NOTE - CHIEF COMPLAINT QUOTE
Pt brought in for ENT transfer from [facility]. Pt has hx of dementia a/o x 0. Pt family noticed a mass on her left and brought her to the hospital. Pt was also treated for a UTI. CT shows  a submandibular mass extending into neck. Pt not answering any questions. Pt received with 22g iv to left wrist and 20g iv to left lower arm with no redness or swelling noted.

## 2021-04-08 NOTE — SWALLOW BEDSIDE ASSESSMENT ADULT - NS ASR SWALLOW FINDINGS DISCUS
SLP spoke with RN regarding results and recommendations/Nursing SLP spoke with RN and called Team 6 regarding results and recommendations/Nursing

## 2021-04-08 NOTE — ED ADULT NURSE NOTE - CHIEF COMPLAINT QUOTE
Pt brought in for ENT transfer from [facility]. Pt has hx of dementia a/o x 0. Pt family noticed a mass on her left and brought her to the hospital. Pt was also treated for a UTI. CT shows  a submandibular mass extending into neck. Pt not answering any questions. Pt received with 22g iv to left wrist and 20g iv to left lower arm with no redness or swelling noted.

## 2021-04-09 DIAGNOSIS — R78.81 BACTEREMIA: ICD-10-CM

## 2021-04-09 LAB
-  AMIKACIN: SIGNIFICANT CHANGE UP
-  AMOXICILLIN/CLAVULANIC ACID: SIGNIFICANT CHANGE UP
-  AMPICILLIN/SULBACTAM: SIGNIFICANT CHANGE UP
-  AMPICILLIN: SIGNIFICANT CHANGE UP
-  AZTREONAM: SIGNIFICANT CHANGE UP
-  CEFAZOLIN: SIGNIFICANT CHANGE UP
-  CEFEPIME: SIGNIFICANT CHANGE UP
-  CEFOXITIN: SIGNIFICANT CHANGE UP
-  CEFTRIAXONE: SIGNIFICANT CHANGE UP
-  CIPROFLOXACIN: SIGNIFICANT CHANGE UP
-  ERTAPENEM: SIGNIFICANT CHANGE UP
-  GENTAMICIN: SIGNIFICANT CHANGE UP
-  IMIPENEM: SIGNIFICANT CHANGE UP
-  LEVOFLOXACIN: SIGNIFICANT CHANGE UP
-  MEROPENEM: SIGNIFICANT CHANGE UP
-  NITROFURANTOIN: SIGNIFICANT CHANGE UP
-  PIPERACILLIN/TAZOBACTAM: SIGNIFICANT CHANGE UP
-  TIGECYCLINE: SIGNIFICANT CHANGE UP
-  TOBRAMYCIN: SIGNIFICANT CHANGE UP
-  TRIMETHOPRIM/SULFAMETHOXAZOLE: SIGNIFICANT CHANGE UP
ANION GAP SERPL CALC-SCNC: 11 MMOL/L — SIGNIFICANT CHANGE UP (ref 7–14)
BASOPHILS # BLD AUTO: 0.04 K/UL — SIGNIFICANT CHANGE UP (ref 0–0.2)
BASOPHILS NFR BLD AUTO: 0.4 % — SIGNIFICANT CHANGE UP (ref 0–2)
BUN SERPL-MCNC: 12 MG/DL — SIGNIFICANT CHANGE UP (ref 7–23)
CALCIUM SERPL-MCNC: 8.5 MG/DL — SIGNIFICANT CHANGE UP (ref 8.4–10.5)
CHLORIDE SERPL-SCNC: 108 MMOL/L — HIGH (ref 98–107)
CO2 SERPL-SCNC: 23 MMOL/L — SIGNIFICANT CHANGE UP (ref 22–31)
COVID-19 SPIKE DOMAIN AB INTERP: POSITIVE
COVID-19 SPIKE DOMAIN ANTIBODY RESULT: >250 U/ML — HIGH
CREAT SERPL-MCNC: 0.86 MG/DL — SIGNIFICANT CHANGE UP (ref 0.5–1.3)
CULTURE RESULTS: SIGNIFICANT CHANGE UP
EOSINOPHIL # BLD AUTO: 0 K/UL — SIGNIFICANT CHANGE UP (ref 0–0.5)
EOSINOPHIL NFR BLD AUTO: 0 % — SIGNIFICANT CHANGE UP (ref 0–6)
GLUCOSE SERPL-MCNC: 87 MG/DL — SIGNIFICANT CHANGE UP (ref 70–99)
GRAM STN FLD: SIGNIFICANT CHANGE UP
HCT VFR BLD CALC: 38 % — SIGNIFICANT CHANGE UP (ref 34.5–45)
HGB BLD-MCNC: 12.5 G/DL — SIGNIFICANT CHANGE UP (ref 11.5–15.5)
IANC: 7.32 K/UL — SIGNIFICANT CHANGE UP (ref 1.5–8.5)
IMM GRANULOCYTES NFR BLD AUTO: 0.5 % — SIGNIFICANT CHANGE UP (ref 0–1.5)
LYMPHOCYTES # BLD AUTO: 1.59 K/UL — SIGNIFICANT CHANGE UP (ref 1–3.3)
LYMPHOCYTES # BLD AUTO: 16.8 % — SIGNIFICANT CHANGE UP (ref 13–44)
MAGNESIUM SERPL-MCNC: 1.9 MG/DL — SIGNIFICANT CHANGE UP (ref 1.6–2.6)
MCHC RBC-ENTMCNC: 29.1 PG — SIGNIFICANT CHANGE UP (ref 27–34)
MCHC RBC-ENTMCNC: 32.9 GM/DL — SIGNIFICANT CHANGE UP (ref 32–36)
MCV RBC AUTO: 88.6 FL — SIGNIFICANT CHANGE UP (ref 80–100)
METHOD TYPE: SIGNIFICANT CHANGE UP
MONOCYTES # BLD AUTO: 0.47 K/UL — SIGNIFICANT CHANGE UP (ref 0–0.9)
MONOCYTES NFR BLD AUTO: 5 % — SIGNIFICANT CHANGE UP (ref 2–14)
NEUTROPHILS # BLD AUTO: 7.32 K/UL — SIGNIFICANT CHANGE UP (ref 1.8–7.4)
NEUTROPHILS NFR BLD AUTO: 77.3 % — HIGH (ref 43–77)
NRBC # BLD: 0 /100 WBCS — SIGNIFICANT CHANGE UP
NRBC # FLD: 0 K/UL — SIGNIFICANT CHANGE UP
ORGANISM # SPEC MICROSCOPIC CNT: SIGNIFICANT CHANGE UP
ORGANISM # SPEC MICROSCOPIC CNT: SIGNIFICANT CHANGE UP
PHOSPHATE SERPL-MCNC: 2.4 MG/DL — LOW (ref 2.5–4.5)
PLATELET # BLD AUTO: 212 K/UL — SIGNIFICANT CHANGE UP (ref 150–400)
POTASSIUM SERPL-MCNC: 3.3 MMOL/L — LOW (ref 3.5–5.3)
POTASSIUM SERPL-SCNC: 3.3 MMOL/L — LOW (ref 3.5–5.3)
RBC # BLD: 4.29 M/UL — SIGNIFICANT CHANGE UP (ref 3.8–5.2)
RBC # FLD: 14.6 % — HIGH (ref 10.3–14.5)
SARS-COV-2 IGG+IGM SERPL QL IA: >250 U/ML — HIGH
SARS-COV-2 IGG+IGM SERPL QL IA: POSITIVE
SODIUM SERPL-SCNC: 142 MMOL/L — SIGNIFICANT CHANGE UP (ref 135–145)
SPECIMEN SOURCE: SIGNIFICANT CHANGE UP
WBC # BLD: 9.47 K/UL — SIGNIFICANT CHANGE UP (ref 3.8–10.5)
WBC # FLD AUTO: 9.47 K/UL — SIGNIFICANT CHANGE UP (ref 3.8–10.5)

## 2021-04-09 PROCEDURE — 99222 1ST HOSP IP/OBS MODERATE 55: CPT

## 2021-04-09 PROCEDURE — 99233 SBSQ HOSP IP/OBS HIGH 50: CPT

## 2021-04-09 RX ORDER — POTASSIUM PHOSPHATE, MONOBASIC POTASSIUM PHOSPHATE, DIBASIC 236; 224 MG/ML; MG/ML
15 INJECTION, SOLUTION INTRAVENOUS ONCE
Refills: 0 | Status: COMPLETED | OUTPATIENT
Start: 2021-04-09 | End: 2021-04-09

## 2021-04-09 RX ORDER — POTASSIUM CHLORIDE 20 MEQ
20 PACKET (EA) ORAL ONCE
Refills: 0 | Status: COMPLETED | OUTPATIENT
Start: 2021-04-09 | End: 2021-04-09

## 2021-04-09 RX ADMIN — AMPICILLIN SODIUM AND SULBACTAM SODIUM 200 GRAM(S): 250; 125 INJECTION, POWDER, FOR SUSPENSION INTRAMUSCULAR; INTRAVENOUS at 21:23

## 2021-04-09 RX ADMIN — ENOXAPARIN SODIUM 30 MILLIGRAM(S): 100 INJECTION SUBCUTANEOUS at 11:53

## 2021-04-09 RX ADMIN — AMPICILLIN SODIUM AND SULBACTAM SODIUM 200 GRAM(S): 250; 125 INJECTION, POWDER, FOR SUSPENSION INTRAMUSCULAR; INTRAVENOUS at 14:38

## 2021-04-09 RX ADMIN — AMPICILLIN SODIUM AND SULBACTAM SODIUM 200 GRAM(S): 250; 125 INJECTION, POWDER, FOR SUSPENSION INTRAMUSCULAR; INTRAVENOUS at 09:29

## 2021-04-09 RX ADMIN — AMPICILLIN SODIUM AND SULBACTAM SODIUM 200 GRAM(S): 250; 125 INJECTION, POWDER, FOR SUSPENSION INTRAMUSCULAR; INTRAVENOUS at 03:05

## 2021-04-09 RX ADMIN — Medication 20 MILLIEQUIVALENT(S): at 11:53

## 2021-04-09 RX ADMIN — SERTRALINE 100 MILLIGRAM(S): 25 TABLET, FILM COATED ORAL at 11:53

## 2021-04-09 RX ADMIN — Medication 88 MICROGRAM(S): at 07:02

## 2021-04-09 RX ADMIN — POTASSIUM PHOSPHATE, MONOBASIC POTASSIUM PHOSPHATE, DIBASIC 62.5 MILLIMOLE(S): 236; 224 INJECTION, SOLUTION INTRAVENOUS at 11:53

## 2021-04-09 NOTE — CONSULT NOTE ADULT - SUBJECTIVE AND OBJECTIVE BOX
HPI:  79yo female with history of Alzheimers (A&Ox0 at baseline), hypothyroidism, DMII (not on any medications) transferred from [facility] for L. submandibular fullness with concern for sialadenitis. History obtained from chart (alternate MRN [id number]) and family (spouse Irineo, daughter Candance). Patient w 2 day history of L. facial swelling. Presented to [place] , found to have large L. submandibular gland swelling on CT Head/Neck and transferred to [facility] for ENT eval. Patient with longstanding Alzheimers, ambulates with assistance at home, needs help with all ADLs by family. Eats a regular diet with ensures, enjoys chocolate sweets. No longer on any Alzheimer's medications as family reported they were not helping. She is incontinent of urine and feces at baseline. She rarely makes her wants known. Patient with grossly positive UA at [place], denies symptoms but unreliable historian.    ED/[facility] Course:  ENT eval. Given Clindamycin x2 (one dose here one at OSH). Blood and urine cxs obtained at [place]. Given 1g Ctx. 1L NS bolus. (2021 11:34)  Blood cultures with MSSA 2 sets.  Found to have left submandibular and parotid sialoadenitis.    PAST MEDICAL & SURGICAL HISTORY:  Diverticulosis    H/O: hypothyroidism    Hypercholesterolemia    Fibromyalgia    Acute type 2 diabetes mellitus with manifestations  boderline- diet controlled    Ankle fracture  s/p right- cast    Alzheimer disease    Hypothyroidism    Frequent UTI    History of cholecystectomy    History of tonsillectomy    Diverticulitis of sigmoid colon  s/p sigmoidectomy    S/P cataract surgery, left    No significant past surgical history        Allergies  Compazine (Other)  Compazine (Unknown)        ANTIMICROBIALS:  ampicillin/sulbactam  IVPB    ampicillin/sulbactam  IVPB 3 every 6 hours  vancomycin  IVPB 1000 every 24 hours      OTHER MEDS: MEDICATIONS  (STANDING):  acetaminophen    Suspension .. 650 every 6 hours PRN  enoxaparin Injectable 30 daily  levothyroxine 88 daily  QUEtiapine 25 at bedtime PRN  sertraline 100 daily      SOCIAL HISTORY: [no ] etoh [ no] tobacco [ ] former smoker [no ] IVDU    FAMILY HISTORY:  No pertinent family history in first degree relatives        REVIEW OF SYSTEMS  [x  ] ROS unobtainable because:  of dementia  [  ] All other systems negative except as noted below:	    Constitutional:  [ ] fever [ ] chills  [ ] weight loss  [ ] weakness  Skin:  [ ] rash [ ] phlebitis	  Eyes: [ ] icterus [ ] pain  [ ] discharge	  ENMT: [ ] sore throat  [ ] thrush [ ] ulcers [ ] exudates  Respiratory: [ ] dyspnea [ ] hemoptysis [ ] cough [ ] sputum	  Cardiovascular:  [ ] chest pain [ ] palpitations [ ] edema	  Gastrointestinal:  [ ] nausea [ ] vomiting [ ] diarrhea [ ] constipation [ ] pain	  Genitourinary:  [ ] dysuria [ ] frequency [ ] hematuria [ ] discharge [ ] flank pain  [ ] incontinence  Musculoskeletal:  [ ] myalgias [ ] arthralgias [ ] arthritis  [ ] back pain  Neurological:  [ ] headache [ ] seizures  [ ] confusion/altered mental status  Psychiatric:  [ ] anxiety [ ] depression	  Hematology/Lymphatics:  [ ] lymphadenopathy  Endocrine:  [ ] adrenal [ ] thyroid  Allergic/Immunologic:	 [ ] transplant [ ] seasonal    Vital Signs Last 24 Hrs  T(F): 98.2 (21 @ 10:58), Max: 99.1 (21 @ 14:43)    Vital Signs Last 24 Hrs  HR: 73 (21 @ 10:58) (70 - 86)  BP: 111/60 (21 @ 10:58) (91/48 - 117/65)  RR: 18 (21 @ 10:58)  SpO2: 100% (21 @ 10:58) (97% - 100%)  Wt(kg): --    PHYSICAL EXAM:  General: [x ] non-toxic  HEAD/EYES: [x ] PERRL [x ] white sclera   ENT:  left neck mass, swelling noted with redness.  Cardiovascular:   [ ] murmur [x ] normal   Respiratory:  [x ] clear to ausculation bilaterally  GI:  [x ] soft, non-tender, normal bowel sounds  :  no collins  Musculoskeletal:  [ x] no synovitis  Neurologic:  [ x] non-focal exam   Skin:  [ x] no rash  Lymph: [ ] no lymphadenopathy  Psychiatric:  [ ] appropriate affect [ ] alert & oriented  Lines:  [x ] no phlebitis                              12.5   9.47  )-----------( 212      ( 2021 07:59 )             38.0           142  |  108<H>  |  12  ----------------------------<  87  3.3<L>   |  23  |  0.86    Ca    8.5      2021 07:59  Phos  2.4     04-09  Mg     1.9     04-09    TPro  6.3  /  Alb  3.1<L>  /  TBili  0.9  /  DBili  x   /  AST  11  /  ALT  13  /  AlkPhos  59  04-08      Urinalysis Basic - ( 2021 16:05 )    Color: Macy / Appearance: very cloudy / S.020 / pH: x  Gluc: x / Ketone: Negative  / Bili: Negative / Urobili: Negative mg/dL   Blood: x / Protein: 30 mg/dL / Nitrite: Positive   Leuk Esterase: Small / RBC: Negative /HPF / WBC 0-2   Sq Epi: x / Non Sq Epi: Occasional / Bacteria: TNTC        MICROBIOLOGY:      Culture - Urine (collected 2021 16:05)  Source: .Urine None  Preliminary Report (2021 19:24):    >100,000 CFU/ml Escherichia coli    Culture - Blood (collected 2021 15:45)  Source: .Blood None  Gram Stain (2021 18:26):    Growth in anaerobic bottle: Gram Positive Cocci in Clusters  Preliminary Report (2021 18:26):    Growth in anaerobic bottle: Gram Positive Cocci in Clusters    ***Blood Panel PCR results on this specimen are available    approximately 3 hours after the Gram stain result.***    Gram stain, PCR, and/or culture results may not always    correspond due todifference in methodologies.    ************************************************************    This PCR assay was performed by multiplex PCR. This    Assay tests for 66 bacterial and resistance gene targets.    Please refer to the [facility] testdirectory    at [email or web address] for details.  Organism: Blood Culture PCR (2021 22:17)  Organism: Blood Culture PCR (2021 22:17)    Culture - Blood (collected 2021 15:45)  Source: .Blood None  Gram Stain (2021 01:45):    Growth in aerobic bottle: Gram Positive Cocci in Clusters  Preliminary Report (2021 01:45):    Growth in aerobic bottle: Gram Positive Cocci in Clusters        RADIOLOGY:  < from: CT Neck Soft Tissue w/ IV Cont (21 @ 18:04) >    IMPRESSION:    Inflammatory process is suspected with enlarged heterogeneously enhancing left submandibular gland and mild left parotid gland involvement. There is adjacent soft tissue inflammation. This is new compared to the previous CT cervical spine 2018. Etiology is uncertain. Tumor cannot be excluded. Additional evaluation is recommended.    < end of copied text >    < from: Xray Chest 1 View- PORTABLE-Urgent (21 @ 15:52) >  FINDINGS/  IMPRESSION:  No new consolidation or pleural effusion    Heart size within normal limits.    < end of copied text >

## 2021-04-09 NOTE — PROGRESS NOTE ADULT - SUBJECTIVE AND OBJECTIVE BOX
STATUS POST:  80y Female admitted for sialadenitis      SUBJECTIVE: Patient seen and examined bedside. Stating she feels better but oral exam continues to be difficult. No purulence expressed. Cleared for mechanical soft/nectar thick liquids per SLP    Vital Signs Last 24 Hrs  T(C): 36.7 (08 Apr 2021 21:41), Max: 36.7 (08 Apr 2021 21:41)  T(F): 98.1 (08 Apr 2021 21:41), Max: 98.1 (08 Apr 2021 21:41)  HR: 86 (08 Apr 2021 21:41) (70 - 86)  BP: 91/48 (08 Apr 2021 21:41) (91/48 - 105/58)  BP(mean): --  RR: 18 (08 Apr 2021 21:41) (17 - 18)  SpO2: 97% (08 Apr 2021 21:41) (97% - 99%)    NAD, resting comfortably in bed   HEENT:  L SMG swelling, + erythema, + tenderness to palp., warmth overlying   Limited oral exam but unable to express purulence       A/P:    79yo F with limited mental status presents for L SMG sialadenitis, likely in the setting of dehydration.   - continue unasyn   - massage QID following warm compresses to the L SMG gland  - unable to obtain culture at bedside, if patient becomes more cooperative with exam, please obtain culture of secretions at [facility]  - sialogogues if able to tolerate  - hydration - IV vs PO per primary team  - ENT will follow

## 2021-04-09 NOTE — PROGRESS NOTE ADULT - PROBLEM SELECTOR PLAN 3
Continue home synthroid 100mcg.  -F/u TSH Continue home synthroid 75mcg.  -TSH elevated at 9  -Increased home synthroid to 88mcg  -F/U T4, FT3

## 2021-04-09 NOTE — PROGRESS NOTE ADULT - PROBLEM SELECTOR PLAN 4
Patient with hx of frequent UTIs. Grossly positive UA with TNTC bacteria at [place]  -F/U Urine cx (under alternate MRN)  -Continue unasyn as above, sufficient for  bugs Patient with hx of frequent UTIs. Grossly positive UA with TNTC bacteria at [place]  -F/U Urine cx   -Continue unasyn as above, sufficient for  bugs

## 2021-04-09 NOTE — PROGRESS NOTE ADULT - PROBLEM SELECTOR PLAN 6
DVT ppx: SCDs  Diet: Soft with ensure, speech eval for further recs  Dispo: Likely home pending clinical course, PT eval

## 2021-04-09 NOTE — CONSULT NOTE ADULT - ASSESSMENT
79yo female with history of Alzheimers (A&Ox0 at baseline), hypothyroidism, DMII presents for left neck swelling and found to have left submandibular and left parotid sialoadenitis with MSSA bacteremia.  MSSA common source of infection related to infected salivary gland in elderly.    REC:  1) STOP Vanco  2) F/up final culture.  3) continue Unasyn 3 grams IV q6  4) Repeat blood cultures x 2  5) F/up ENT - no surgical indication now  6) ?see if pt will comply with supportive measures - lemon drops, etc as per ENT and warm compresses  7) Check TTE  8) Likely will need 4 weeks of antibiotics given bacteremia though if culture clears quickly, 2 weeks may suffice.  Will need to see TTE first.    D/w Dr. Lombardo  Please call ID service with questions this weekend.    Katie Garcia MD  [phone] (pager)  [phone] (office)  79yo female with history of Alzheimers (A&Ox0 at baseline), hypothyroidism, DMII presents for left neck swelling and found to have left submandibular and left parotid sialoadenitis with MSSA bacteremia.  MSSA common source of infection related to infected salivary gland in elderly.    REC:  1) STOP Vanco  2) F/up final culture.  3) continue Unasyn 3 grams IV q6  4) Repeat blood cultures x 2  5) F/up ENT - no surgical indication now  6) ?see if pt will comply with supportive measures - lemon drops, etc as per ENT and warm compresses  7) Check TTE  8) Likely will need 4 weeks of antibiotics given bacteremia though if culture clears quickly, 2 weeks may suffice.  Will need to see TTE first.  9) Urine with E coli, but UA w/o pyuria. Doubt infection.    D/w Dr. Lombardo  Please call ID service with questions this weekend.    Katie Garcia MD  [phone] (pager)  [phone] (office)

## 2021-04-09 NOTE — PROGRESS NOTE ADULT - PROBLEM SELECTOR PLAN 1
Presented w staph bacteremia from likely L. submandibular gland source.  -On vancomycin 1g q24h, f/u trough before 3rd dose  -Repeat blood cx  -echocardiogram  -ID c/s

## 2021-04-09 NOTE — PROGRESS NOTE ADULT - SUBJECTIVE AND OBJECTIVE BOX
~~~~~~~~~~~~~~~~~~~~~~~~~~~~~~~~~~  Lukasz Kraft, PGY1  Pager [phone] ([facility]) [phone] ([facility])  After 7: Night Float Pager  ~~~~~~~~~~~~~~~~~~~~~~~~~~~~~~~~~~    PROGRESS NOTE:     Patient is a 80y old  Female who presents with a chief complaint of Sialadenitis (2021 04:45)      SUBJECTIVE / OVERNIGHT EVENTS: No acute events overnight.    ADDITIONAL REVIEW OF SYSTEMS:    MEDICATIONS  (STANDING):  ampicillin/sulbactam  IVPB      ampicillin/sulbactam  IVPB 3 Gram(s) IV Intermittent every 6 hours  enoxaparin Injectable 30 milliGRAM(s) SubCutaneous daily  levothyroxine 88 MICROGram(s) Oral daily  potassium chloride   Powder 20 milliEquivalent(s) Oral once  potassium phosphate IVPB 15 milliMole(s) IV Intermittent once  sertraline 100 milliGRAM(s) Oral daily  vancomycin  IVPB 1000 milliGRAM(s) IV Intermittent every 24 hours    MEDICATIONS  (PRN):  acetaminophen    Suspension .. 650 milliGRAM(s) Oral every 6 hours PRN Temp greater or equal to 38C (100.4F), Mild Pain (1 - 3)  QUEtiapine 25 milliGRAM(s) Oral at bedtime PRN [place]/agitation      CAPILLARY BLOOD GLUCOSE        I&O's Summary    2021 07:01  -  2021 07:00  --------------------------------------------------------  IN: 630 mL / OUT: 700 mL / NET: -70 mL        PHYSICAL EXAM:  Vital Signs Last 24 Hrs  T(C): 36.3 (2021 06:08), Max: 36.7 (2021 21:41)  T(F): 97.3 (2021 06:08), Max: 98.1 (2021 21:41)  HR: 70 (2021 06:08) (70 - 86)  BP: 112/59 (2021 06:08) (91/48 - 112/59)  BP(mean): --  RR: 20 (2021 06:08) (17 - 20)  SpO2: 100% (2021 06:08) (97% - 100%)    CONSTITUTIONAL: NAD  HENT: Improved L. submandibular swelling from exam yesterday  RESPIRATORY: Normal respiratory effort; lungs are clear to auscultation bilaterally  CARDIOVASCULAR: Regular rate and rhythm, normal S1 and S2, no murmur/rub/gallop; No lower extremity edema; Peripheral pulses are 2+ bilaterally  ABDOMEN: Nontender to palpation, normoactive bowel sounds, no rebound/guarding; No hepatosplenomegaly  MUSCLOSKELETAL: no clubbing or cyanosis of digits; no joint swelling or tenderness to palpation  PSYCH: A+Ox0-1. Anxious appearing    LABS:                        12.5   9.47  )-----------( 212      ( 2021 07:59 )             38.0     04-09    142  |  108<H>  |  12  ----------------------------<  87  3.3<L>   |  23  |  0.86    Ca    8.5      2021 07:59  Phos  2.4     04-09  Mg     1.9     04-09    TPro  6.3  /  Alb  3.1<L>  /  TBili  0.9  /  DBili  x   /  AST  11  /  ALT  13  /  AlkPhos  59  04-08    PT/INR - ( 2021 15:45 )   PT: 13.8 sec;   INR: 1.19 ratio         PTT - ( 2021 15:45 )  PTT:29.4 sec      Urinalysis Basic - ( 2021 16:05 )    Color: Macy / Appearance: very cloudy / S.020 / pH: x  Gluc: x / Ketone: Negative  / Bili: Negative / Urobili: Negative mg/dL   Blood: x / Protein: 30 mg/dL / Nitrite: Positive   Leuk Esterase: Small / RBC: Negative /HPF / WBC 0-2   Sq Epi: x / Non Sq Epi: Occasional / Bacteria: TNTC        Culture - Urine (collected 2021 16:05)  Source: .Urine None  Preliminary Report (2021 19:24):    >100,000 CFU/ml Escherichia coli    Culture - Blood (collected 2021 15:45)  Source: .Blood None  Gram Stain (2021 18:26):    Growth in anaerobic bottle: Gram Positive Cocci in Clusters  Preliminary Report (2021 18:26):    Growth in anaerobic bottle: Gram Positive Cocci in Clusters    ***Blood Panel PCR results on this specimen are available    approximately 3 hours after the Gram stain result.***    Gram stain, PCR, and/or culture results may not always    correspond due todifference in methodologies.    ************************************************************    This PCR assay was performed by multiplex PCR. This    Assay tests for 66 bacterial and resistance gene targets.    Please refer to the [facility] Drug Response Dx testdirectory    at [email or web address] for details.  Organism: Blood Culture PCR (2021 22:17)  Organism: Blood Culture PCR (2021 22:17)    Culture - Blood (collected 2021 15:45)  Source: .Blood None  Gram Stain (2021 01:45):    Growth in aerobic bottle: Gram Positive Cocci in Clusters  Preliminary Report (2021 01:45):    Growth in aerobic bottle: Gram Positive Cocci in Clusters        RADIOLOGY & ADDITIONAL TESTS:  Results Reviewed:   Imaging Personally Reviewed:  Electrocardiogram Personally Reviewed:    COORDINATION OF CARE:  Care Discussed with Consultants/Other Providers [Y/N]:  Prior or Outpatient Records Reviewed [Y/N]:

## 2021-04-09 NOTE — PROGRESS NOTE ADULT - PROBLEM SELECTOR PLAN 2
Patient transferred from [facility] for ENT evaluation of left sided sialadentitis vs concern for underlying tumor  -Per ENT no acute surgical intervention  -Continue warm compresses, manual massage of gland, sialogogues  -ENT following, appreciate assistance  -Start unasyn 3g q6h  -MRSA swab  -Tylenol q6h for pain control  -F/U Blood cx (under alternate MRN) Patient transferred from [facility] for ENT evaluation of left sided sialadentitis vs concern for underlying tumor  -Per ENT no acute surgical intervention  -Continue warm compresses, manual massage of gland, sialogogues  -ENT following, appreciate assistance  -Start unasyn 3g q6h  -MRSA swab  -Tylenol q6h for pain control  -F/U Blood cx

## 2021-04-10 LAB
-  AMPICILLIN/SULBACTAM: SIGNIFICANT CHANGE UP
-  CEFAZOLIN: SIGNIFICANT CHANGE UP
-  CLINDAMYCIN: SIGNIFICANT CHANGE UP
-  ERYTHROMYCIN: SIGNIFICANT CHANGE UP
-  GENTAMICIN: SIGNIFICANT CHANGE UP
-  OXACILLIN: SIGNIFICANT CHANGE UP
-  PENICILLIN: SIGNIFICANT CHANGE UP
-  RIFAMPIN: SIGNIFICANT CHANGE UP
-  TETRACYCLINE: SIGNIFICANT CHANGE UP
-  TRIMETHOPRIM/SULFAMETHOXAZOLE: SIGNIFICANT CHANGE UP
-  VANCOMYCIN: SIGNIFICANT CHANGE UP
ANION GAP SERPL CALC-SCNC: 9 MMOL/L — SIGNIFICANT CHANGE UP (ref 7–14)
BUN SERPL-MCNC: 13 MG/DL — SIGNIFICANT CHANGE UP (ref 7–23)
CALCIUM SERPL-MCNC: 8.6 MG/DL — SIGNIFICANT CHANGE UP (ref 8.4–10.5)
CHLORIDE SERPL-SCNC: 111 MMOL/L — HIGH (ref 98–107)
CO2 SERPL-SCNC: 24 MMOL/L — SIGNIFICANT CHANGE UP (ref 22–31)
CREAT SERPL-MCNC: 0.87 MG/DL — SIGNIFICANT CHANGE UP (ref 0.5–1.3)
CULTURE RESULTS: SIGNIFICANT CHANGE UP
CULTURE RESULTS: SIGNIFICANT CHANGE UP
GLUCOSE SERPL-MCNC: 101 MG/DL — HIGH (ref 70–99)
HCT VFR BLD CALC: 38.5 % — SIGNIFICANT CHANGE UP (ref 34.5–45)
HGB BLD-MCNC: 12.3 G/DL — SIGNIFICANT CHANGE UP (ref 11.5–15.5)
MAGNESIUM SERPL-MCNC: 2 MG/DL — SIGNIFICANT CHANGE UP (ref 1.6–2.6)
MCHC RBC-ENTMCNC: 28.7 PG — SIGNIFICANT CHANGE UP (ref 27–34)
MCHC RBC-ENTMCNC: 31.9 GM/DL — LOW (ref 32–36)
MCV RBC AUTO: 89.7 FL — SIGNIFICANT CHANGE UP (ref 80–100)
METHOD TYPE: SIGNIFICANT CHANGE UP
NRBC # BLD: 0 /100 WBCS — SIGNIFICANT CHANGE UP
NRBC # FLD: 0 K/UL — SIGNIFICANT CHANGE UP
ORGANISM # SPEC MICROSCOPIC CNT: SIGNIFICANT CHANGE UP
PHOSPHATE SERPL-MCNC: 2.9 MG/DL — SIGNIFICANT CHANGE UP (ref 2.5–4.5)
PLATELET # BLD AUTO: 245 K/UL — SIGNIFICANT CHANGE UP (ref 150–400)
POTASSIUM SERPL-MCNC: 3.7 MMOL/L — SIGNIFICANT CHANGE UP (ref 3.5–5.3)
POTASSIUM SERPL-SCNC: 3.7 MMOL/L — SIGNIFICANT CHANGE UP (ref 3.5–5.3)
RBC # BLD: 4.29 M/UL — SIGNIFICANT CHANGE UP (ref 3.8–5.2)
RBC # FLD: 14.6 % — HIGH (ref 10.3–14.5)
SODIUM SERPL-SCNC: 144 MMOL/L — SIGNIFICANT CHANGE UP (ref 135–145)
SPECIMEN SOURCE: SIGNIFICANT CHANGE UP
SPECIMEN SOURCE: SIGNIFICANT CHANGE UP
T3 SERPL-MCNC: 51 NG/DL — LOW (ref 80–200)
T4 FREE SERPL-MCNC: 1 NG/DL — SIGNIFICANT CHANGE UP (ref 0.9–1.8)
WBC # BLD: 7.86 K/UL — SIGNIFICANT CHANGE UP (ref 3.8–10.5)
WBC # FLD AUTO: 7.86 K/UL — SIGNIFICANT CHANGE UP (ref 3.8–10.5)

## 2021-04-10 PROCEDURE — 99232 SBSQ HOSP IP/OBS MODERATE 35: CPT

## 2021-04-10 PROCEDURE — 99233 SBSQ HOSP IP/OBS HIGH 50: CPT | Mod: GC

## 2021-04-10 RX ADMIN — AMPICILLIN SODIUM AND SULBACTAM SODIUM 200 GRAM(S): 250; 125 INJECTION, POWDER, FOR SUSPENSION INTRAMUSCULAR; INTRAVENOUS at 14:11

## 2021-04-10 RX ADMIN — AMPICILLIN SODIUM AND SULBACTAM SODIUM 200 GRAM(S): 250; 125 INJECTION, POWDER, FOR SUSPENSION INTRAMUSCULAR; INTRAVENOUS at 21:49

## 2021-04-10 RX ADMIN — AMPICILLIN SODIUM AND SULBACTAM SODIUM 200 GRAM(S): 250; 125 INJECTION, POWDER, FOR SUSPENSION INTRAMUSCULAR; INTRAVENOUS at 02:11

## 2021-04-10 RX ADMIN — ENOXAPARIN SODIUM 30 MILLIGRAM(S): 100 INJECTION SUBCUTANEOUS at 11:10

## 2021-04-10 RX ADMIN — AMPICILLIN SODIUM AND SULBACTAM SODIUM 200 GRAM(S): 250; 125 INJECTION, POWDER, FOR SUSPENSION INTRAMUSCULAR; INTRAVENOUS at 08:18

## 2021-04-10 RX ADMIN — Medication 88 MICROGRAM(S): at 05:39

## 2021-04-10 RX ADMIN — SERTRALINE 100 MILLIGRAM(S): 25 TABLET, FILM COATED ORAL at 11:10

## 2021-04-10 NOTE — PROVIDER CONTACT NOTE (OTHER) - ASSESSMENT
Pt BP is 102/58 mmhg. Asymptomatic
pt BP 91/48. pt denies symptoms of dizziness/lightheadedness. pt free from distress.

## 2021-04-10 NOTE — PROGRESS NOTE ADULT - SUBJECTIVE AND OBJECTIVE BOX
Follow Up:  MSSA bacteremia, sialadenitis     Interval History/ROS:  confused unable to offer hx  tearful    Allergies  Compazine (Other)  Compazine (Unknown)        ANTIMICROBIALS:  ampicillin/sulbactam  IVPB    ampicillin/sulbactam  IVPB 3 every 6 hours      OTHER MEDS:  acetaminophen    Suspension .. 650 milliGRAM(s) Oral every 6 hours PRN  enoxaparin Injectable 30 milliGRAM(s) SubCutaneous daily  levothyroxine 88 MICROGram(s) Oral daily  QUEtiapine 25 milliGRAM(s) Oral at bedtime PRN  sertraline 100 milliGRAM(s) Oral daily      Vital Signs Last 24 Hrs  T(C): 36.8 (10 Apr 2021 05:37), Max: 36.8 (09 Apr 2021 18:34)  T(F): 98.3 (10 Apr 2021 05:37), Max: 98.3 (10 Apr 2021 05:37)  HR: 73 (10 Apr 2021 05:37) (73 - 100)  BP: 116/68 (10 Apr 2021 05:37) (102/58 - 116/68)  BP(mean): --  RR: 16 (10 Apr 2021 05:37) (16 - 18)  SpO2: 100% (10 Apr 2021 05:37) (97% - 100%)    PHYSICAL EXAM:  Constitutional: Not in acute distress  Eyes: No icterus.  Oral cavity: Clear, no lesions  Neck:  swelling left submandibular  RS: no respiratory distress on RA  CVS: S1, S2 heard.   Abdomen: Soft.   Skin: No lesions noted  Vascular: No evidence of phlebitis  Neuro: Alert, confused, tearful                          12.3   7.86  )-----------( 245      ( 10 Apr 2021 07:50 )             38.5       04-10    144  |  111<H>  |  13  ----------------------------<  101<H>  3.7   |  24  |  0.87    Ca    8.6      10 Apr 2021 07:56  Phos  2.9     04-10  Mg     2.0     04-10            MICROBIOLOGY:    .Nose  04-08-21   Culture in progress  --  --      .Urine None  04-07-21   >100,000 CFU/ml Escherichia coli  --  Escherichia coli      .Blood None  04-07-21   Growth in aerobic bottle: Staphylococcus aureus  See previous culture [id number]  --  Blood Culture PCR  Staphylococcus aureus          RADIOLOGY:    a< from: CT Neck Soft Tissue w/ IV Cont (04.07.21 @ 18:04) >  IMPRESSION:    Inflammatory process is suspected with enlarged heterogeneously enhancing left submandibular gland and mild left parotid gland involvement. There is adjacent soft tissue inflammation. This is new compared to the previous CT cervical spine 4/7/2018. Etiology is uncertain. Tumor cannot be excluded. Additional evaluation is recommended.            DARINEL IBARRA MD, Attending Radiologist  This document has been electronically signed. Apr 7 2021  6:20PM    < end of copied text >

## 2021-04-10 NOTE — PROGRESS NOTE ADULT - PROBLEM SELECTOR PLAN 1
Presented w staph bacteremia from likely L. submandibular gland source.  -On vancomycin 1g q24h, f/u trough before 3rd dose  -Repeat blood cx  -echocardiogram  -ID c/s Presented w staph bacteremia from likely L. submandibular gland source.  -On vancomycin 1g q24h, f/u trough before 3rd dose  -f/u blood cx  -echocardiogram  -ID c/s

## 2021-04-10 NOTE — PROGRESS NOTE ADULT - PROBLEM SELECTOR PLAN 4
Patient with hx of frequent UTIs. Grossly positive UA with TNTC bacteria at [place]  -F/U Urine cx   -Continue unasyn as above, sufficient for  bugs Patient with hx of frequent UTIs. Grossly positive UA with TNTC bacteria at [place]. Urine Cx showed Ecoli. Blood Cx showed Staph Aureus.   -Continue unasyn as above, sufficient for  bugs

## 2021-04-10 NOTE — PROGRESS NOTE ADULT - PROBLEM SELECTOR PLAN 3
Continue home synthroid 75mcg.  -TSH elevated at 9  -Increased home synthroid to 88mcg  -F/U T4, FT3

## 2021-04-10 NOTE — PROGRESS NOTE ADULT - PROBLEM SELECTOR PLAN 2
Patient transferred from [facility] for ENT evaluation of left sided sialadentitis vs concern for underlying tumor  -Per ENT no acute surgical intervention  -Continue warm compresses, manual massage of gland, sialogogues  -ENT following, appreciate assistance  -Start unasyn 3g q6h  -MRSA swab  -Tylenol q6h for pain control  -F/U Blood cx Patient transferred from [facility] for ENT evaluation of left sided sialadentitis vs concern for underlying tumor  -Per ENT no acute surgical intervention  -Continue warm compresses, manual massage of gland, sialogogues. Obtain culture of secretions at [facility]  -ENT following, appreciate assistance  -Start unasyn 3g q6h  -MRSA swab  -Tylenol q6h for pain control  -F/U Blood cx

## 2021-04-10 NOTE — PROGRESS NOTE ADULT - SUBJECTIVE AND OBJECTIVE BOX
STATUS POST:  80y Female admitted for sialadenitis      SUBJECTIVE:   Patient seen and examined bedside. Refusing oral exam.     T(C): 36.8 (04-10-21 @ 05:37), Max: 36.8 (04-09-21 @ 10:58)  HR: 73 (04-10-21 @ 05:37) (73 - 100)  BP: 116/68 (04-10-21 @ 05:37) (102/58 - 117/65)  RR: 16 (04-10-21 @ 05:37) (16 - 18)  SpO2: 100% (04-10-21 @ 05:37) (97% - 100%)    NAD, resting comfortably in bed   HEENT:  L SMG full and firm. No overlying erythema. mild/mod TTP   Limited oral exam       A/P:    79yo F with limited mental status presents for L SMG sialadenitis, likely in the setting of dehydration. Exam without overlying erythema today and appears to be improving overall.   - continue unasyn  - massage QID following warm compresses to the L SMG gland  - unable to obtain culture at bedside, if patient becomes more cooperative with exam, please obtain culture of secretions at [facility]  - sialogogues if able to tolerate  - hydration - IV vs PO per primary team

## 2021-04-10 NOTE — PROGRESS NOTE ADULT - SUBJECTIVE AND OBJECTIVE BOX
Patient is a 80y old  Female who presents with a chief complaint of Sialadenitis (10 Apr 2021 07:25)      SUBJECTIVE / OVERNIGHT EVENTS:    ADDITIONAL REVIEW OF SYSTEMS:    MEDICATIONS  (STANDING):  ampicillin/sulbactam  IVPB      ampicillin/sulbactam  IVPB 3 Gram(s) IV Intermittent every 6 hours  enoxaparin Injectable 30 milliGRAM(s) SubCutaneous daily  levothyroxine 88 MICROGram(s) Oral daily  sertraline 100 milliGRAM(s) Oral daily    MEDICATIONS  (PRN):  acetaminophen    Suspension .. 650 milliGRAM(s) Oral every 6 hours PRN Temp greater or equal to 38C (100.4F), Mild Pain (1 - 3)  QUEtiapine 25 milliGRAM(s) Oral at bedtime PRN [place]/agitation      CAPILLARY BLOOD GLUCOSE        I&O's Summary    09 Apr 2021 07:01  -  10 Apr 2021 07:00  --------------------------------------------------------  IN: 251 mL / OUT: 0 mL / NET: 251 mL        PHYSICAL EXAM:  Vital Signs Last 24 Hrs  T(C): 36.8 (10 Apr 2021 05:37), Max: 36.8 (09 Apr 2021 10:58)  T(F): 98.3 (10 Apr 2021 05:37), Max: 98.3 (10 Apr 2021 05:37)  HR: 73 (10 Apr 2021 05:37) (73 - 100)  BP: 116/68 (10 Apr 2021 05:37) (102/58 - 116/68)  BP(mean): --  RR: 16 (10 Apr 2021 05:37) (16 - 18)  SpO2: 100% (10 Apr 2021 05:37) (97% - 100%)    GENERAL: No acute distress, well-developed  HEAD:  Atraumatic, Normocephalic  EYES: EOMI, PERRLA, conjunctiva and sclera clear  NECK: Supple, no lymphadenopathy, no JVD  CHEST/LUNG: CTAB; No wheezes, rales, or rhonchi  HEART: Regular rate and rhythm; No murmurs, rubs, or gallops  ABDOMEN: Soft, non-tender, non-distended; normal bowel sounds, no organomegaly  EXTREMITIES:  2+ peripheral pulses b/l, No clubbing, cyanosis, or edema  NEUROLOGY: A&O x 3, no focal deficits  SKIN: No rashes or lesions    LABS:                        12.3   7.86  )-----------( 245      ( 10 Apr 2021 07:50 )             38.5     04-10    144  |  111<H>  |  13  ----------------------------<  101<H>  3.7   |  24  |  0.87    Ca    8.6      10 Apr 2021 07:56  Phos  2.9     04-10  Mg     2.0     04-10                Culture - Nose (collected 08 Apr 2021 21:00)  Source: .Nose  Preliminary Report (09 Apr 2021 16:29):    Culture in progress    Culture - Urine (collected 07 Apr 2021 16:05)  Source: .Urine None  Final Report (09 Apr 2021 19:00):    >100,000 CFU/ml Escherichia coli  Organism: Escherichia coli (09 Apr 2021 19:00)  Organism: Escherichia coli (09 Apr 2021 19:00)    Culture - Blood (collected 07 Apr 2021 15:45)  Source: .Blood None  Gram Stain (08 Apr 2021 18:26):    Growth in anaerobic bottle: Gram Positive Cocci in Clusters  Preliminary Report (09 Apr 2021 17:08):    Growth in anaerobic bottle: Staphylococcus aureus    ***Blood Panel PCR results on this specimen are available    approximately 3 hours after the Gram stain result.***    Gram stain, PCR, and/or culture results may not always    correspond due to difference in methodologies.    ************************************************************    This PCR assay was performed by multiplex PCR. This    Assay tests for 66 bacterial and resistance gene targets.    Please refer to the [facility] test directory    at [email or web address] for details.  Organism: Blood Culture PCR (08 Apr 2021 22:17)  Organism: Blood Culture PCR (08 Apr 2021 22:17)    Culture - Blood (collected 07 Apr 2021 15:45)  Source: .Blood None  Gram Stain (09 Apr 2021 01:45):    Growth in aerobic bottle: Gram Positive Cocci in Clusters  Preliminary Report (09 Apr 2021 21:06):    Growth in aerobic bottle: Staphylococcus aureus    See previous culture [id number]        RADIOLOGY & ADDITIONAL TESTS:  Results Reviewed:   Imaging Personally Reviewed:  Electrocardiogram Personally Reviewed:    COORDINATION OF CARE:  Care Discussed with Consultants/Other Providers [Y/N]:  Prior or Outpatient Records Reviewed [Y/N]:       Patient is a 80y old  Female who presents with a chief complaint of Sialadenitis (10 Apr 2021 07:25)      SUBJECTIVE / OVERNIGHT EVENTS:  No acute event overnight. Patient was alert, not oriented. She denied any pain or discomfort.     ADDITIONAL REVIEW OF SYSTEMS:    MEDICATIONS  (STANDING):  ampicillin/sulbactam  IVPB      ampicillin/sulbactam  IVPB 3 Gram(s) IV Intermittent every 6 hours  enoxaparin Injectable 30 milliGRAM(s) SubCutaneous daily  levothyroxine 88 MICROGram(s) Oral daily  sertraline 100 milliGRAM(s) Oral daily    MEDICATIONS  (PRN):  acetaminophen    Suspension .. 650 milliGRAM(s) Oral every 6 hours PRN Temp greater or equal to 38C (100.4F), Mild Pain (1 - 3)  QUEtiapine 25 milliGRAM(s) Oral at bedtime PRN [place]/agitation      CAPILLARY BLOOD GLUCOSE        I&O's Summary    09 Apr 2021 07:01  -  10 Apr 2021 07:00  --------------------------------------------------------  IN: 251 mL / OUT: 0 mL / NET: 251 mL        PHYSICAL EXAM:  Vital Signs Last 24 Hrs  T(C): 36.8 (10 Apr 2021 05:37), Max: 36.8 (09 Apr 2021 10:58)  T(F): 98.3 (10 Apr 2021 05:37), Max: 98.3 (10 Apr 2021 05:37)  HR: 73 (10 Apr 2021 05:37) (73 - 100)  BP: 116/68 (10 Apr 2021 05:37) (102/58 - 116/68)  BP(mean): --  RR: 16 (10 Apr 2021 05:37) (16 - 18)  SpO2: 100% (10 Apr 2021 05:37) (97% - 100%)    GENERAL: No acute distress, well-developed  HEAD:  Atraumatic, Normocephalic  EYES: EOMI, PERRLA, conjunctiva and sclera clear  NECK: Supple, no lymphadenopathy, no JVD. Improved L. submandibular swelling  CHEST/LUNG: CTAB; No wheezes, rales, or rhonchi  HEART: Regular rate and rhythm; No murmurs, rubs, or gallops  ABDOMEN: Soft, non-tender, non-distended; normal bowel sounds, no organomegaly  EXTREMITIES:  2+ peripheral pulses b/l, No clubbing, cyanosis, or edema  NEUROLOGY: A&O x 1, no focal deficits  SKIN: No rashes or lesions    LABS:                        12.3   7.86  )-----------( 245      ( 10 Apr 2021 07:50 )             38.5     04-10    144  |  111<H>  |  13  ----------------------------<  101<H>  3.7   |  24  |  0.87    Ca    8.6      10 Apr 2021 07:56  Phos  2.9     04-10  Mg     2.0     04-10                Culture - Nose (collected 08 Apr 2021 21:00)  Source: .Nose  Preliminary Report (09 Apr 2021 16:29):    Culture in progress    Culture - Urine (collected 07 Apr 2021 16:05)  Source: .Urine None  Final Report (09 Apr 2021 19:00):    >100,000 CFU/ml Escherichia coli  Organism: Escherichia coli (09 Apr 2021 19:00)  Organism: Escherichia coli (09 Apr 2021 19:00)    Culture - Blood (collected 07 Apr 2021 15:45)  Source: .Blood None  Gram Stain (08 Apr 2021 18:26):    Growth in anaerobic bottle: Gram Positive Cocci in Clusters  Preliminary Report (09 Apr 2021 17:08):    Growth in anaerobic bottle: Staphylococcus aureus    ***Blood Panel PCR results on this specimen are available    approximately 3 hours after the Gram stain result.***    Gram stain, PCR, and/or culture results may not always    correspond due to difference in methodologies.    ************************************************************    This PCR assay was performed by multiplex PCR. This    Assay tests for 66 bacterial and resistance gene targets.    Please refer to the [facility] test directory    at [email or web address] for details.  Organism: Blood Culture PCR (08 Apr 2021 22:17)  Organism: Blood Culture PCR (08 Apr 2021 22:17)    Culture - Blood (collected 07 Apr 2021 15:45)  Source: .Blood None  Gram Stain (09 Apr 2021 01:45):    Growth in aerobic bottle: Gram Positive Cocci in Clusters  Preliminary Report (09 Apr 2021 21:06):    Growth in aerobic bottle: Staphylococcus aureus    See previous culture [id number]        RADIOLOGY & ADDITIONAL TESTS:  Results Reviewed:   Imaging Personally Reviewed:  Electrocardiogram Personally Reviewed:    COORDINATION OF CARE:  Care Discussed with Consultants/Other Providers [Y/N]:  Prior or Outpatient Records Reviewed [Y/N]:

## 2021-04-11 ENCOUNTER — TRANSCRIPTION ENCOUNTER (OUTPATIENT)
Age: 81
End: 2021-04-11

## 2021-04-11 LAB
ANION GAP SERPL CALC-SCNC: 11 MMOL/L — SIGNIFICANT CHANGE UP (ref 7–14)
BUN SERPL-MCNC: 10 MG/DL — SIGNIFICANT CHANGE UP (ref 7–23)
CALCIUM SERPL-MCNC: 8.8 MG/DL — SIGNIFICANT CHANGE UP (ref 8.4–10.5)
CHLORIDE SERPL-SCNC: 105 MMOL/L — SIGNIFICANT CHANGE UP (ref 98–107)
CO2 SERPL-SCNC: 22 MMOL/L — SIGNIFICANT CHANGE UP (ref 22–31)
CREAT SERPL-MCNC: 0.71 MG/DL — SIGNIFICANT CHANGE UP (ref 0.5–1.3)
GLUCOSE SERPL-MCNC: 98 MG/DL — SIGNIFICANT CHANGE UP (ref 70–99)
HCT VFR BLD CALC: 37.6 % — SIGNIFICANT CHANGE UP (ref 34.5–45)
HGB BLD-MCNC: 12.3 G/DL — SIGNIFICANT CHANGE UP (ref 11.5–15.5)
MAGNESIUM SERPL-MCNC: 2.1 MG/DL — SIGNIFICANT CHANGE UP (ref 1.6–2.6)
MCHC RBC-ENTMCNC: 28.8 PG — SIGNIFICANT CHANGE UP (ref 27–34)
MCHC RBC-ENTMCNC: 32.7 GM/DL — SIGNIFICANT CHANGE UP (ref 32–36)
MCV RBC AUTO: 88.1 FL — SIGNIFICANT CHANGE UP (ref 80–100)
NRBC # BLD: 0 /100 WBCS — SIGNIFICANT CHANGE UP
NRBC # FLD: 0 K/UL — SIGNIFICANT CHANGE UP
PHOSPHATE SERPL-MCNC: 2.3 MG/DL — LOW (ref 2.5–4.5)
PLATELET # BLD AUTO: 251 K/UL — SIGNIFICANT CHANGE UP (ref 150–400)
POTASSIUM SERPL-MCNC: 4 MMOL/L — SIGNIFICANT CHANGE UP (ref 3.5–5.3)
POTASSIUM SERPL-SCNC: 4 MMOL/L — SIGNIFICANT CHANGE UP (ref 3.5–5.3)
RBC # BLD: 4.27 M/UL — SIGNIFICANT CHANGE UP (ref 3.8–5.2)
RBC # FLD: 14.4 % — SIGNIFICANT CHANGE UP (ref 10.3–14.5)
SODIUM SERPL-SCNC: 138 MMOL/L — SIGNIFICANT CHANGE UP (ref 135–145)
WBC # BLD: 10.53 K/UL — HIGH (ref 3.8–10.5)
WBC # FLD AUTO: 10.53 K/UL — HIGH (ref 3.8–10.5)

## 2021-04-11 PROCEDURE — 99233 SBSQ HOSP IP/OBS HIGH 50: CPT | Mod: GC

## 2021-04-11 RX ORDER — OLANZAPINE 15 MG/1
1.25 TABLET, FILM COATED ORAL ONCE
Refills: 0 | Status: DISCONTINUED | OUTPATIENT
Start: 2021-04-11 | End: 2021-04-13

## 2021-04-11 RX ORDER — SODIUM,POTASSIUM PHOSPHATES 278-250MG
1 POWDER IN PACKET (EA) ORAL THREE TIMES A DAY
Refills: 0 | Status: COMPLETED | OUTPATIENT
Start: 2021-04-11 | End: 2021-04-12

## 2021-04-11 RX ADMIN — Medication 1 PACKET(S): at 14:42

## 2021-04-11 RX ADMIN — Medication 88 MICROGRAM(S): at 06:30

## 2021-04-11 RX ADMIN — Medication 1 PACKET(S): at 21:33

## 2021-04-11 RX ADMIN — AMPICILLIN SODIUM AND SULBACTAM SODIUM 200 GRAM(S): 250; 125 INJECTION, POWDER, FOR SUSPENSION INTRAMUSCULAR; INTRAVENOUS at 21:33

## 2021-04-11 RX ADMIN — SERTRALINE 100 MILLIGRAM(S): 25 TABLET, FILM COATED ORAL at 13:29

## 2021-04-11 RX ADMIN — ENOXAPARIN SODIUM 30 MILLIGRAM(S): 100 INJECTION SUBCUTANEOUS at 13:30

## 2021-04-11 RX ADMIN — AMPICILLIN SODIUM AND SULBACTAM SODIUM 200 GRAM(S): 250; 125 INJECTION, POWDER, FOR SUSPENSION INTRAMUSCULAR; INTRAVENOUS at 03:27

## 2021-04-11 RX ADMIN — AMPICILLIN SODIUM AND SULBACTAM SODIUM 200 GRAM(S): 250; 125 INJECTION, POWDER, FOR SUSPENSION INTRAMUSCULAR; INTRAVENOUS at 14:42

## 2021-04-11 RX ADMIN — AMPICILLIN SODIUM AND SULBACTAM SODIUM 200 GRAM(S): 250; 125 INJECTION, POWDER, FOR SUSPENSION INTRAMUSCULAR; INTRAVENOUS at 11:01

## 2021-04-11 NOTE — DISCHARGE NOTE PROVIDER - HOSPITAL COURSE
HPI:  79yo female with history of Alzheimers (A&Ox0 at baseline), hypothyroidism, DMII (not on any medications) transferred from [facility] for L. submandibular fullness with concern for sialadenitis. History obtained from chart (alternate MRN [id number]) and family (spouse Irineo, daughter Candance). Patient w 2 day history of L. facial swelling. Presented to [facility] 4/7, found to have large L. submandibular gland swelling on CT Head/Neck and transferred to [facility] for ENT eval. Patient with longstanding Alzheimers, ambulates with assistance at home, needs help with all ADLs by family. Eats a regular diet with ensures, enjoys chocolate sweets. No longer on any Alzheimer's medications as family reported they were not helping. She is incontinent of urine and feces at baseline. She rarely makes her wants known. Patient with grossly positive UA at [facility], denies symptoms but unreliable historian.    ED/[facility] Hospital Course:  ENT eval. Given Clindamycin x2 (one dose here one at OSH). Blood and urine cxs obtained at [facility]. Given 1g Ctx. 1L NS bolus. (08 Apr 2021 11:34)    Hospital Course:  ENT evaluate patient, recommended conservative management of L. SMG sialadenitis with sialagogues, warm compresses, and manual massage. Patient found to have MSSA bacteremia. Repeat cultures were negative. ID was consulted for long-term management. Midline/PICC placed, patient will be discharged on XXXXX to complete XXXX day course.    HPI:  79yo female with history of Alzheimers (A&Ox0 at baseline), hypothyroidism, DMII (not on any medications) transferred from [facility] for L. submandibular fullness with concern for sialadenitis. History obtained from chart (alternate MRN [id number]) and family (spouse Irineo, daughter Candance). Patient w 2 day history of L. facial swelling. Presented to [facility] 4/7, found to have large L. submandibular gland swelling on CT Head/Neck and transferred to [facility] for ENT eval. Patient with longstanding Alzheimers, ambulates with assistance at home, needs help with all ADLs by family. Eats a regular diet with ensures, enjoys chocolate sweets. No longer on any Alzheimer's medications as family reported they were not helping. She is incontinent of urine and feces at baseline. She rarely makes her wants known. Patient with grossly positive UA at [facility], denies symptoms but unreliable historian.    ED/[facility] Hospital Course:  ENT eval. Given Clindamycin x2 (one dose here one at OSH). Blood and urine cxs obtained at [facility]. Given 1g Ctx. 1L NS bolus. (08 Apr 2021 11:34)    Hospital Course:  ENT evaluate patient, recommended conservative management of L. SMG sialadenitis with sialagogues, warm compresses, and manual massage. Patient found to have MSSA bacteremia. Repeat cultures were negative on 4/9/21. ID was consulted for long-term management. Patient also had UTI so was placed on unasyn IV for several days for coverage of MSSA and UTI. Transthoracic Echo without evidence of endocarditis. Midline/PICC placed, patient will be discharged on ancef to complete  course on 5/9/21. Patient was medically optimized, stable and ready for discharge. Plan of care and return precautions were discussed with the patient's family who verbally stated understanding.

## 2021-04-11 NOTE — DISCHARGE NOTE PROVIDER - NSDCFUADDINST_GEN_ALL_CORE_FT
Please follow up with your primary care doctor when you get home from rehab.    Please follow up with the infectious disease doctor when you leave the hospital.

## 2021-04-11 NOTE — DISCHARGE NOTE PROVIDER - NSDCMRMEDTOKEN_GEN_ALL_CORE_FT
levothyroxine 75 mcg (0.075 mg) oral tablet: 1 tab(s) orally once a day  ***DrFirst***  levothyroxine 75 mcg (0.075 mg)/mL oral solution: 1 milliliter(s) orally once a day  SEROquel 25 mg oral tablet: 25 milligram(s) orally once a day (at bedtime) as needed for sundowning  sertraline 100 mg oral tablet: 1 tab(s) orally once a day  sertraline 100 mg oral tablet: 1 tab(s) orally once a day  ***DrFirst***   acetaminophen 160 mg/5 mL oral suspension: 20.31 milliliter(s) orally every 6 hours, As needed, Temp greater or equal to 38C (100.4F), Mild Pain (1 - 3)  cbc with differential: once a week while on antibiotics  ceFAZolin: 2 gram(s) intravenous every 8 hours  cmp: once a week while on antibiotics  enoxaparin: 30 milligram(s) subcutaneous once a day  levothyroxine 88 mcg (0.088 mg) oral tablet: 1 tab(s) orally once a day  QUEtiapine 25 mg oral tablet: 1 tab(s) orally once a day (at bedtime), As needed, [place]/agitation  sertraline 100 mg oral tablet: 1 tab(s) orally once a day

## 2021-04-11 NOTE — PROGRESS NOTE ADULT - PROBLEM SELECTOR PLAN 1
Presented w staph bacteremia from likely L. submandibular gland source.  -On vancomycin 1g q24h, f/u trough before 3rd dose  -repeat blood cx NGTD  -echocardiogram  -ID c/s, appreciate recs, likely long-term cefazolin

## 2021-04-11 NOTE — DISCHARGE NOTE PROVIDER - NSDCCPCAREPLAN_GEN_ALL_CORE_FT
PRINCIPAL DISCHARGE DIAGNOSIS  Diagnosis: Submandibular space infection  Assessment and Plan of Treatment: You were found to have an infection of your left submandibular gland. You were treated with IV antibiotics and manual massage of your salivary gland.       PRINCIPAL DISCHARGE DIAGNOSIS  Diagnosis: Submandibular space infection  Assessment and Plan of Treatment: You were found to have an infection of your left submandibular gland. You were treated with IV antibiotics and manual massage of your salivary gland. Please return to the hospital if your symptoms worsen, you develop severe fevers, chills, chest pain, or difficulty breathing. If you notice your heart racing or feel faint you should also come to the hospital right away.      SECONDARY DISCHARGE DIAGNOSES  Diagnosis: MSSA bacteremia  Assessment and Plan of Treatment: You were found to have a bloodstream infection, probably from your submandibular space infection, with methacillin sensative staph aureus. You were treated with antibiotics in the hospital and will require antibiotics by IV: cefazolin 2 grams every 8 hours until 5/7/21. You will need to follow up with the infectious disease specialist and get labwork done weekly while on these antibiotics. You had an echocardiogram done to make sure that the infection had not spread to your heart valves and the scan showed no evidence of valve infection.

## 2021-04-11 NOTE — PROGRESS NOTE ADULT - PROBLEM SELECTOR PLAN 4
Patient with hx of frequent UTIs. Grossly positive UA with TNTC bacteria at [place]. Urine Cx showed Ecoli. Blood Cx showed Staph Aureus.   -Continue unasyn as above, sufficient for  bugs

## 2021-04-11 NOTE — DISCHARGE NOTE PROVIDER - CARE PROVIDER_API CALL
Katie Garcia)  Infectious Disease; Internal Medicine  [address] [place] [address]  Phone: [phone]  Fax: [phone]  Follow Up Time:

## 2021-04-11 NOTE — PROGRESS NOTE ADULT - SUBJECTIVE AND OBJECTIVE BOX
~~~~~~~~~~~~~~~~~~~~~~~~~~~~~~~~~~  Lukasz Kraft, PGY1  Pager [phone] (NS) [phone] ([facility])  After 7: Night Float Pager  ~~~~~~~~~~~~~~~~~~~~~~~~~~~~~~~~~~    PROGRESS NOTE:     Patient is a 80y old  Female who presents with a chief complaint of Sialadenitis (10 Apr 2021 12:34)      SUBJECTIVE / OVERNIGHT EVENTS: No acute events overnight. Denies acute complaints. Other ROS difficult to obtain 2/2 mental status.      MEDICATIONS  (STANDING):  ampicillin/sulbactam  IVPB      ampicillin/sulbactam  IVPB 3 Gram(s) IV Intermittent every 6 hours  enoxaparin Injectable 30 milliGRAM(s) SubCutaneous daily  levothyroxine 88 MICROGram(s) Oral daily  sertraline 100 milliGRAM(s) Oral daily    MEDICATIONS  (PRN):  acetaminophen    Suspension .. 650 milliGRAM(s) Oral every 6 hours PRN Temp greater or equal to 38C (100.4F), Mild Pain (1 - 3)  QUEtiapine 25 milliGRAM(s) Oral at bedtime PRN [place]/agitation      CAPILLARY BLOOD GLUCOSE        I&O's Summary    10 Apr 2021 07:01  -  11 Apr 2021 07:00  --------------------------------------------------------  IN: 500 mL / OUT: 0 mL / NET: 500 mL        PHYSICAL EXAM:  Vital Signs Last 24 Hrs  T(C): 37.2 (11 Apr 2021 05:37), Max: 37.2 (11 Apr 2021 05:37)  T(F): 99 (11 Apr 2021 05:37), Max: 99 (11 Apr 2021 05:37)  HR: 78 (11 Apr 2021 05:37) (74 - 79)  BP: 101/71 (11 Apr 2021 05:37) (101/71 - 146/60)  BP(mean): --  RR: 16 (11 Apr 2021 05:37) (16 - 18)  SpO2: 96% (11 Apr 2021 05:37) (96% - 100%)    CONSTITUTIONAL: NAD  HENT: Improved L. SMG swelling  RESPIRATORY: Normal respiratory effort; lungs are clear to auscultation bilaterally  CARDIOVASCULAR: Regular rate and rhythm, normal S1 and S2, no murmur/rub/gallop; No lower extremity edema; Peripheral pulses are 2+ bilaterally  ABDOMEN: Nontender to palpation, normoactive bowel sounds, no rebound/guarding; No hepatosplenomegaly  MUSCLOSKELETAL: no clubbing or cyanosis of digits; no joint swelling or tenderness to palpation  PSYCH: A+O x0-1    LABS:                        12.3   7.86  )-----------( 245      ( 10 Apr 2021 07:50 )             38.5     04-10    144  |  111<H>  |  13  ----------------------------<  101<H>  3.7   |  24  |  0.87    Ca    8.6      10 Apr 2021 07:56  Phos  2.9     04-10  Mg     2.0     04-10                Culture - Blood (collected 09 Apr 2021 20:11)  Source: .Blood Blood-Peripheral  Preliminary Report (10 Apr 2021 21:01):    No growth to date.    Culture - Blood (collected 09 Apr 2021 20:11)  Source: .Blood Blood-Peripheral  Preliminary Report (10 Apr 2021 21:01):    No growth to date.    Culture - Blood (collected 09 Apr 2021 14:37)  Source: .Blood Blood  Preliminary Report (10 Apr 2021 15:04):    No growth to date.    Culture - Blood (collected 09 Apr 2021 14:37)  Source: .Blood Blood  Preliminary Report (10 Apr 2021 15:04):    No growth to date.    Culture - Nose (collected 08 Apr 2021 17:34)  Source: .Nose  Final Report (10 Apr 2021 21:36):    No MRSA isolated    No Staph Aureus (MSSA) isolated "This can represent normal nasal    carriage.  PCR is more sensitive for identifying MRSA/MSSA carriage"        RADIOLOGY & ADDITIONAL TESTS:  Results Reviewed:   Imaging Personally Reviewed:  Electrocardiogram Personally Reviewed:    COORDINATION OF CARE:  Care Discussed with Consultants/Other Providers [Y/N]:  Prior or Outpatient Records Reviewed [Y/N]:

## 2021-04-11 NOTE — PROGRESS NOTE ADULT - SUBJECTIVE AND OBJECTIVE BOX
STATUS POST:  80y Female     SUBJECTIVE: Patient seen and examined bedside. Exam improved from prior visit.     Vital Signs Last 24 Hrs  T(C): 37.2 (11 Apr 2021 05:37), Max: 37.2 (11 Apr 2021 05:37)  T(F): 99 (11 Apr 2021 05:37), Max: 99 (11 Apr 2021 05:37)  HR: 78 (11 Apr 2021 05:37) (74 - 79)  BP: 101/71 (11 Apr 2021 05:37) (101/71 - 146/60)  BP(mean): --  RR: 16 (11 Apr 2021 05:37) (16 - 18)  SpO2: 96% (11 Apr 2021 05:37) (96% - 100%)      NAD, resting comfortably in bed   HEENT:  L SMG full and firm but reduced from prior exam. No overlying erythema. mild/mod TTP   No purulence expressed upon massage.     A/P:    81yo F with limited mental status presents for L SMG sialadenitis, likely in the setting of dehydration. Exam without overlying erythema today and appears to be improving overall.     - should complete abx and MASH (massage, antibiotics, sialagogues, hydration) to complete a total of 14 days  - please page ENT if exam worsens

## 2021-04-11 NOTE — PROGRESS NOTE ADULT - PROBLEM SELECTOR PLAN 3
Continue home synthroid 75mcg.  -TSH elevated at 9  -Increased home synthroid to 88mcg  -T4 wnl, T3 low

## 2021-04-11 NOTE — PROGRESS NOTE ADULT - PROBLEM SELECTOR PLAN 2
Patient transferred from [facility] for ENT evaluation of left sided sialadentitis vs concern for underlying tumor  -Per ENT no acute surgical intervention  -Continue warm compresses, manual massage of gland, sialogogues. Obtain culture of secretions at [facility]  -ENT following, appreciate assistance  -Start unasyn 3g q6h  -MRSA swab  -Tylenol q6h for pain control  -F/U Blood cx

## 2021-04-12 LAB
ANION GAP SERPL CALC-SCNC: 10 MMOL/L — SIGNIFICANT CHANGE UP (ref 7–14)
BUN SERPL-MCNC: 9 MG/DL — SIGNIFICANT CHANGE UP (ref 7–23)
CALCIUM SERPL-MCNC: 8.9 MG/DL — SIGNIFICANT CHANGE UP (ref 8.4–10.5)
CHLORIDE SERPL-SCNC: 102 MMOL/L — SIGNIFICANT CHANGE UP (ref 98–107)
CO2 SERPL-SCNC: 24 MMOL/L — SIGNIFICANT CHANGE UP (ref 22–31)
CREAT SERPL-MCNC: 0.77 MG/DL — SIGNIFICANT CHANGE UP (ref 0.5–1.3)
GLUCOSE BLDC GLUCOMTR-MCNC: 100 MG/DL — HIGH (ref 70–99)
GLUCOSE BLDC GLUCOMTR-MCNC: 114 MG/DL — HIGH (ref 70–99)
GLUCOSE BLDC GLUCOMTR-MCNC: 126 MG/DL — HIGH (ref 70–99)
GLUCOSE SERPL-MCNC: 106 MG/DL — HIGH (ref 70–99)
HCT VFR BLD CALC: 41.3 % — SIGNIFICANT CHANGE UP (ref 34.5–45)
HGB BLD-MCNC: 13.6 G/DL — SIGNIFICANT CHANGE UP (ref 11.5–15.5)
MAGNESIUM SERPL-MCNC: 2.2 MG/DL — SIGNIFICANT CHANGE UP (ref 1.6–2.6)
MCHC RBC-ENTMCNC: 29 PG — SIGNIFICANT CHANGE UP (ref 27–34)
MCHC RBC-ENTMCNC: 32.9 GM/DL — SIGNIFICANT CHANGE UP (ref 32–36)
MCV RBC AUTO: 88.1 FL — SIGNIFICANT CHANGE UP (ref 80–100)
NRBC # BLD: 0 /100 WBCS — SIGNIFICANT CHANGE UP
NRBC # FLD: 0 K/UL — SIGNIFICANT CHANGE UP
PHOSPHATE SERPL-MCNC: 3.1 MG/DL — SIGNIFICANT CHANGE UP (ref 2.5–4.5)
PLATELET # BLD AUTO: 236 K/UL — SIGNIFICANT CHANGE UP (ref 150–400)
POTASSIUM SERPL-MCNC: 4.3 MMOL/L — SIGNIFICANT CHANGE UP (ref 3.5–5.3)
POTASSIUM SERPL-SCNC: 4.3 MMOL/L — SIGNIFICANT CHANGE UP (ref 3.5–5.3)
RBC # BLD: 4.69 M/UL — SIGNIFICANT CHANGE UP (ref 3.8–5.2)
RBC # FLD: 14.3 % — SIGNIFICANT CHANGE UP (ref 10.3–14.5)
SARS-COV-2 RNA SPEC QL NAA+PROBE: SIGNIFICANT CHANGE UP
SODIUM SERPL-SCNC: 136 MMOL/L — SIGNIFICANT CHANGE UP (ref 135–145)
WBC # BLD: 10.53 K/UL — HIGH (ref 3.8–10.5)
WBC # FLD AUTO: 10.53 K/UL — HIGH (ref 3.8–10.5)

## 2021-04-12 PROCEDURE — 99232 SBSQ HOSP IP/OBS MODERATE 35: CPT

## 2021-04-12 PROCEDURE — 93306 TTE W/DOPPLER COMPLETE: CPT | Mod: 26

## 2021-04-12 PROCEDURE — 99233 SBSQ HOSP IP/OBS HIGH 50: CPT

## 2021-04-12 RX ORDER — DEXTROSE 50 % IN WATER 50 %
25 SYRINGE (ML) INTRAVENOUS ONCE
Refills: 0 | Status: DISCONTINUED | OUTPATIENT
Start: 2021-04-12 | End: 2021-04-13

## 2021-04-12 RX ORDER — SODIUM CHLORIDE 9 MG/ML
1000 INJECTION, SOLUTION INTRAVENOUS
Refills: 0 | Status: DISCONTINUED | OUTPATIENT
Start: 2021-04-12 | End: 2021-04-13

## 2021-04-12 RX ORDER — AMPICILLIN SODIUM AND SULBACTAM SODIUM 250; 125 MG/ML; MG/ML
3 INJECTION, POWDER, FOR SUSPENSION INTRAMUSCULAR; INTRAVENOUS EVERY 6 HOURS
Refills: 0 | Status: DISCONTINUED | OUTPATIENT
Start: 2021-04-12 | End: 2021-04-13

## 2021-04-12 RX ORDER — DEXTROSE 50 % IN WATER 50 %
15 SYRINGE (ML) INTRAVENOUS ONCE
Refills: 0 | Status: DISCONTINUED | OUTPATIENT
Start: 2021-04-12 | End: 2021-04-13

## 2021-04-12 RX ORDER — GLUCAGON INJECTION, SOLUTION 0.5 MG/.1ML
1 INJECTION, SOLUTION SUBCUTANEOUS ONCE
Refills: 0 | Status: DISCONTINUED | OUTPATIENT
Start: 2021-04-12 | End: 2021-04-13

## 2021-04-12 RX ORDER — DEXTROSE 50 % IN WATER 50 %
12.5 SYRINGE (ML) INTRAVENOUS ONCE
Refills: 0 | Status: DISCONTINUED | OUTPATIENT
Start: 2021-04-12 | End: 2021-04-13

## 2021-04-12 RX ORDER — INSULIN LISPRO 100/ML
VIAL (ML) SUBCUTANEOUS
Refills: 0 | Status: DISCONTINUED | OUTPATIENT
Start: 2021-04-12 | End: 2021-04-13

## 2021-04-12 RX ADMIN — AMPICILLIN SODIUM AND SULBACTAM SODIUM 200 GRAM(S): 250; 125 INJECTION, POWDER, FOR SUSPENSION INTRAMUSCULAR; INTRAVENOUS at 21:09

## 2021-04-12 RX ADMIN — AMPICILLIN SODIUM AND SULBACTAM SODIUM 200 GRAM(S): 250; 125 INJECTION, POWDER, FOR SUSPENSION INTRAMUSCULAR; INTRAVENOUS at 14:40

## 2021-04-12 RX ADMIN — SERTRALINE 100 MILLIGRAM(S): 25 TABLET, FILM COATED ORAL at 13:40

## 2021-04-12 RX ADMIN — AMPICILLIN SODIUM AND SULBACTAM SODIUM 200 GRAM(S): 250; 125 INJECTION, POWDER, FOR SUSPENSION INTRAMUSCULAR; INTRAVENOUS at 08:50

## 2021-04-12 RX ADMIN — AMPICILLIN SODIUM AND SULBACTAM SODIUM 200 GRAM(S): 250; 125 INJECTION, POWDER, FOR SUSPENSION INTRAMUSCULAR; INTRAVENOUS at 03:14

## 2021-04-12 RX ADMIN — Medication 1 PACKET(S): at 06:59

## 2021-04-12 RX ADMIN — Medication 88 MICROGRAM(S): at 06:59

## 2021-04-12 RX ADMIN — ENOXAPARIN SODIUM 30 MILLIGRAM(S): 100 INJECTION SUBCUTANEOUS at 13:41

## 2021-04-12 NOTE — PROGRESS NOTE ADULT - SUBJECTIVE AND OBJECTIVE BOX
*******************************************************  Kb Griffith MD PGY-1  Internal Medicine  Pager ([facility]) [phone] / ([facility]) [phone]  *******************************************************  Patient is a 80y old  Female who presents with a chief complaint of Sialadenitis (11 Apr 2021 09:36)      SUBJECTIVE / OVERNIGHT EVENTS:  - No acute events overnight.   - Patient seen and evaluated at bedside.  - No complaints this morning  - ROS: Denies fevers/chills, headache, SOB at rest, cough, chest pain, palpitations, abdominal pain, nausea/vomiting/diarrhea/constipation, melena/hematochezia, dysuria, and hematuria    ------------------------------------------------------------------------------------------------------------    MEDICATIONS  (STANDING):  ampicillin/sulbactam  IVPB      ampicillin/sulbactam  IVPB 3 Gram(s) IV Intermittent every 6 hours  dextrose 40% Gel 15 Gram(s) Oral once  dextrose 5%. 1000 milliLiter(s) (50 mL/Hr) IV Continuous <Continuous>  dextrose 5%. 1000 milliLiter(s) (100 mL/Hr) IV Continuous <Continuous>  dextrose 50% Injectable 25 Gram(s) IV Push once  dextrose 50% Injectable 12.5 Gram(s) IV Push once  dextrose 50% Injectable 25 Gram(s) IV Push once  enoxaparin Injectable 30 milliGRAM(s) SubCutaneous daily  glucagon  Injectable 1 milliGRAM(s) IntraMuscular once  insulin lispro (ADMELOG) corrective regimen sliding scale   SubCutaneous three times a day before meals  levothyroxine 88 MICROGram(s) Oral daily  sertraline 100 milliGRAM(s) Oral daily    MEDICATIONS  (PRN):  acetaminophen    Suspension .. 650 milliGRAM(s) Oral every 6 hours PRN Temp greater or equal to 38C (100.4F), Mild Pain (1 - 3)  OLANZapine Injectable 1.25 milliGRAM(s) IntraMuscular once PRN echocardiogram  QUEtiapine 25 milliGRAM(s) Oral at bedtime PRN [place]/agitation      ------------------------------------------------------------------------------------------------------------    OBJECTIVE:    CAPILLARY BLOOD GLUCOSE      POCT Blood Glucose.: 126 mg/dL (12 Apr 2021 12:15)    I&O's Summary    11 Apr 2021 07:01  -  12 Apr 2021 07:00  --------------------------------------------------------  IN: 350 mL / OUT: 0 mL / NET: 350 mL      Daily     Daily   Weight (kg): 51.4 (04-08-21 @ 06:09), 56.7 (04-07-21 @ 13:55)    PHYSICAL EXAM:  T(F): 97.7, Max: 99 (04-11-21 @ 21:32)  HR: 75 (75 - 81)  BP: 119/81 (106/62 - 123/73)  RR: 16 (16 - 17)  SpO2: 98% (98% - 100%)      CONSTITUTIONAL: NAD, well-developed  HEENT: + tender swelling on L neck/mandible. NCAT, EOMI, PERRLA, no scleral icterus, MMM  RESPIRATORY/CHEST: Normal respiratory effort; lungs are clear to auscultation bilaterally; no wheezing/crackles  CARDIO: Regular rate, normal S1 and S2, no murmur/rub/gallop; No JVD  VASCULAR: No lower extremity edema; Peripheral pulses are 2+ bilaterally; Capillary refill brisk  ABDOMEN: Soft, nontender to palpation, normoactive bowel sounds, no rebound/guarding; No hepatosplenomegaly  MUSCULOSKELETAL: no joint swelling or tenderness to palpation, full strength all extremities.  EXTREMITIES: hands/feet are warm, and without cyanosis or clubbing  SKIN: no visible rashes, pallor, diaphoresis, or jaundice  NEURO: No focal deficits; moving all extremities  PSYCH: A+O 0-1    ------------------------------------------------------------------------------------------------------------  LABS:                        13.6   10.53 )-----------( 236      ( 12 Apr 2021 09:10 )             41.3     04-12    136  |  102  |  9   ----------------------------<  106<H>  4.3   |  24  |  0.77    Ca    8.9      12 Apr 2021 09:10  Phos  3.1     04-12  Mg     2.2     04-12                Culture - Blood (collected 09 Apr 2021 20:11)  Source: .Blood Blood-Peripheral  Preliminary Report (10 Apr 2021 21:01):    No growth to date.    Culture - Blood (collected 09 Apr 2021 20:11)  Source: .Blood Blood-Peripheral  Preliminary Report (10 Apr 2021 21:01):    No growth to date.    Culture - Blood (collected 09 Apr 2021 14:37)  Source: .Blood Blood  Preliminary Report (10 Apr 2021 15:04):    No growth to date.    Culture - Blood (collected 09 Apr 2021 14:37)  Source: .Blood Blood  Preliminary Report (10 Apr 2021 15:04):    No growth to date.        RADIOLOGY & ADDITIONAL TESTS:  Results Reviewed:     Imaging Personally Reviewed:  Electrocardiogram Personally Reviewed:      COORDINATION OF CARE:  Care discussed with consultants/other providers and notes reviewed [Y]:   ------------------------------------------------------------------------------------------------------------

## 2021-04-12 NOTE — PROGRESS NOTE ADULT - PROBLEM SELECTOR PLAN 6
DVT ppx: SCDs  Diet: Soft with ensure, speech eval for further recs  Dispo: PT rec [facility] but family requesting to bring patient home

## 2021-04-12 NOTE — PROGRESS NOTE ADULT - PROBLEM SELECTOR PLAN 1
Presented w staph bacteremia from likely L. submandibular gland source.  -On unasyn IV  -repeat blood cx 4/9 NGTD  -echocardiogram to r/o endocarditis (low suspicion - shouldn't need GEORGE)  -ID c/s, appreciate recs, likely long-term cefazolin  -will likely need midline vs picc depending on TTE

## 2021-04-12 NOTE — PROGRESS NOTE ADULT - PROBLEM SELECTOR PLAN 4
Patient with hx of frequent UTIs. Grossly positive UA with TNTC bacteria at [place]. Urine Cx showed Ecoli. Blood Cx showed Staph Aureus.   -Continue unasyn as above, sufficient for  bugs

## 2021-04-12 NOTE — CHART NOTE - NSCHARTNOTEFT_GEN_A_CORE
Spoke with patient's spouse Irineo and daughter Myra this afternoon. Gave them medical update and discussed discharge options of home vs rehab. Explained that if she were to go home the family would need to be responsible for administration of q8 IV antibiotics. Explained that at rehab they will take care of antibiotics and she can also receive PT several times per week. At this time they are leaning towards 2 weeks of [facility] prior to home.

## 2021-04-12 NOTE — CHART NOTE - NSCHARTNOTEFT_GEN_A_CORE
ID ADDENDUM:  Reviewed TTE as below:  < from: Transthoracic Echocardiogram (04.12.21 @ 12:52) >    CONCLUSIONS:  1. Mitral annular calcification and calcified mitral  leaflets with normal diastolic opening. Minimal mitral  regurgitation.  2. Calcified trileaflet aortic valve with normal opening.  3. Normal left ventricular systolic function. No segmental  wall motion abnormalities.  4. Normal right ventricular size and systolic function.  5. Thickened pericardium with small pericardial effusion.    < end of copied text >      No clear evidence of endocarditis.  Can switch to ancef 2 grams IV q8.  Blood cultures negative at 72 hours. Can place picc line.    End date 5/7/2021  Please check cbc w/ diff and cmp weekly while on therapy.    Please call ID service with questions or if change in status.  D/w resident    Katie Garcia MD  [phone] (pager)  [phone] (office)

## 2021-04-12 NOTE — PROGRESS NOTE ADULT - PROBLEM SELECTOR PLAN 2
Patient transferred from [facility] for ENT evaluation of left sided sialadentitis vs concern for underlying tumor  -Per ENT no acute surgical intervention  -Continue warm compresses, manual massage of gland, sialogogues. Obtain culture of secretions at [facility]  -c/w unasyn 3g q6h  -Tylenol q6h for pain control  -Blood cx negative  -if SOB or shaking chills will need to consider Lemierre syndrome

## 2021-04-12 NOTE — PROGRESS NOTE ADULT - SUBJECTIVE AND OBJECTIVE BOX
Follow Up:  MSSA bacteremia, sialadenitis     Interval History/ROS:  confused unable to offer hx     Allergies  Compazine (Other)  Compazine (Unknown)        ANTIMICROBIALS:    ampicillin/sulbactam  IVPB    ampicillin/sulbactam  IVPB 3 every 6 hours        OTHER MEDS:  MEDICATIONS  (STANDING):  acetaminophen    Suspension .. 650 every 6 hours PRN  dextrose 40% Gel 15 once  dextrose 50% Injectable 25 once  dextrose 50% Injectable 12.5 once  dextrose 50% Injectable 25 once  enoxaparin Injectable 30 daily  glucagon  Injectable 1 once  insulin lispro (ADMELOG) corrective regimen sliding scale  three times a day before meals  levothyroxine 88 daily  OLANZapine Injectable 1.25 once PRN  QUEtiapine 25 at bedtime PRN  sertraline 100 daily        Vital Signs Last 24 Hrs  T(F): 97.7 (04-12-21 @ 06:08), Max: 99 (04-11-21 @ 21:32)  HR: 75 (04-12-21 @ 06:08)  BP: 119/81 (04-12-21 @ 06:08)  RR: 16 (04-12-21 @ 06:08)  SpO2: 98% (04-12-21 @ 06:08) (98% - 100%)  Wt(kg): --    PHYSICAL EXAM:  Constitutional: Not in acute distress  Eyes: No icterus.  Oral cavity: Clear, no lesions  Neck:  swelling left submandibular  RS: no respiratory distress on RA  CVS: S1, S2 heard.   Abdomen: Soft.   Skin: No lesions noted  Vascular: No evidence of phlebitis  Neuro: Alert, confused, tearful                          13.6   10.53 )-----------( 236      ( 12 Apr 2021 09:10 )             41.3 04-12    136  |  102  |  9   ----------------------------<  106  4.3   |  24  |  0.77  Ca    8.9      12 Apr 2021 09:10Phos  3.1     04-12Mg     2.2     04-12                            12.3   7.86  )-----------( 245      ( 10 Apr 2021 07:50 )             38.5       04-10    144  |  111<H>  |  13  ----------------------------<  101<H>  3.7   |  24  |  0.87    Ca    8.6      10 Apr 2021 07:56  Phos  2.9     04-10  Mg     2.0     04-10            MICROBIOLOGY:    Culture - Blood (collected 09 Apr 2021 20:11)  Source: .Blood Blood-Peripheral  Preliminary Report (10 Apr 2021 21:01):    No growth to date.    Culture - Blood (collected 09 Apr 2021 20:11)  Source: .Blood Blood-Peripheral  Preliminary Report (10 Apr 2021 21:01):    No growth to date.    Culture - Blood (collected 09 Apr 2021 14:37)  Source: .Blood Blood  Preliminary Report (10 Apr 2021 15:04):    No growth to date.    Culture - Blood (collected 09 Apr 2021 14:37)  Source: .Blood Blood  Preliminary Report (10 Apr 2021 15:04):    No growth to date.        .Nose  04-08-21   Culture in progress  --  --      .Urine None  04-07-21   >100,000 CFU/ml Escherichia coli  --  Escherichia coli      .Blood None  04-07-21   Growth in aerobic bottle: Staphylococcus aureus  See previous culture [id number]  --  Blood Culture PCR  Staphylococcus aureus  MSSA        RADIOLOGY:    a< from: CT Neck Soft Tissue w/ IV Cont (04.07.21 @ 18:04) >  IMPRESSION:    Inflammatory process is suspected with enlarged heterogeneously enhancing left submandibular gland and mild left parotid gland involvement. There is adjacent soft tissue inflammation. This is new compared to the previous CT cervical spine 4/7/2018. Etiology is uncertain. Tumor cannot be excluded. Additional evaluation is recommended.            DARINEL IBARRA MD, Attending Radiologist  This document has been electronically signed. Apr 7 2021  6:20PM    < end of copied text >

## 2021-04-13 ENCOUNTER — TRANSCRIPTION ENCOUNTER (OUTPATIENT)
Age: 81
End: 2021-04-13

## 2021-04-13 VITALS
TEMPERATURE: 98 F | HEART RATE: 75 BPM | DIASTOLIC BLOOD PRESSURE: 63 MMHG | SYSTOLIC BLOOD PRESSURE: 108 MMHG | OXYGEN SATURATION: 97 % | RESPIRATION RATE: 18 BRPM

## 2021-04-13 LAB
ANION GAP SERPL CALC-SCNC: 11 MMOL/L — SIGNIFICANT CHANGE UP (ref 7–14)
APTT BLD: 32.7 SEC — SIGNIFICANT CHANGE UP (ref 27–36.3)
BUN SERPL-MCNC: 13 MG/DL — SIGNIFICANT CHANGE UP (ref 7–23)
CALCIUM SERPL-MCNC: 9.2 MG/DL — SIGNIFICANT CHANGE UP (ref 8.4–10.5)
CHLORIDE SERPL-SCNC: 102 MMOL/L — SIGNIFICANT CHANGE UP (ref 98–107)
CO2 SERPL-SCNC: 21 MMOL/L — LOW (ref 22–31)
CREAT SERPL-MCNC: 0.69 MG/DL — SIGNIFICANT CHANGE UP (ref 0.5–1.3)
GLUCOSE BLDC GLUCOMTR-MCNC: 108 MG/DL — HIGH (ref 70–99)
GLUCOSE BLDC GLUCOMTR-MCNC: 93 MG/DL — SIGNIFICANT CHANGE UP (ref 70–99)
GLUCOSE SERPL-MCNC: 107 MG/DL — HIGH (ref 70–99)
HCT VFR BLD CALC: 42 % — SIGNIFICANT CHANGE UP (ref 34.5–45)
HGB BLD-MCNC: 13.6 G/DL — SIGNIFICANT CHANGE UP (ref 11.5–15.5)
INR BLD: 1.12 RATIO — SIGNIFICANT CHANGE UP (ref 0.88–1.16)
MAGNESIUM SERPL-MCNC: 2.4 MG/DL — SIGNIFICANT CHANGE UP (ref 1.6–2.6)
MCHC RBC-ENTMCNC: 28.6 PG — SIGNIFICANT CHANGE UP (ref 27–34)
MCHC RBC-ENTMCNC: 32.4 GM/DL — SIGNIFICANT CHANGE UP (ref 32–36)
MCV RBC AUTO: 88.2 FL — SIGNIFICANT CHANGE UP (ref 80–100)
NRBC # BLD: 0 /100 WBCS — SIGNIFICANT CHANGE UP
NRBC # FLD: 0 K/UL — SIGNIFICANT CHANGE UP
PHOSPHATE SERPL-MCNC: 3.2 MG/DL — SIGNIFICANT CHANGE UP (ref 2.5–4.5)
PLATELET # BLD AUTO: 250 K/UL — SIGNIFICANT CHANGE UP (ref 150–400)
POTASSIUM SERPL-MCNC: 4.7 MMOL/L — SIGNIFICANT CHANGE UP (ref 3.5–5.3)
POTASSIUM SERPL-SCNC: 4.7 MMOL/L — SIGNIFICANT CHANGE UP (ref 3.5–5.3)
PROTHROM AB SERPL-ACNC: 12.7 SEC — SIGNIFICANT CHANGE UP (ref 10.6–13.6)
RBC # BLD: 4.76 M/UL — SIGNIFICANT CHANGE UP (ref 3.8–5.2)
RBC # FLD: 14.5 % — SIGNIFICANT CHANGE UP (ref 10.3–14.5)
SODIUM SERPL-SCNC: 134 MMOL/L — LOW (ref 135–145)
WBC # BLD: 10.6 K/UL — HIGH (ref 3.8–10.5)
WBC # FLD AUTO: 10.6 K/UL — HIGH (ref 3.8–10.5)

## 2021-04-13 PROCEDURE — 99239 HOSP IP/OBS DSCHRG MGMT >30: CPT

## 2021-04-13 RX ORDER — ACETAMINOPHEN 500 MG
20.31 TABLET ORAL
Qty: 0 | Refills: 0 | DISCHARGE
Start: 2021-04-13

## 2021-04-13 RX ORDER — ENOXAPARIN SODIUM 100 MG/ML
30 INJECTION SUBCUTANEOUS
Qty: 0 | Refills: 0 | DISCHARGE
Start: 2021-04-13

## 2021-04-13 RX ORDER — QUETIAPINE FUMARATE 200 MG/1
25 TABLET, FILM COATED ORAL
Qty: 0 | Refills: 0 | DISCHARGE

## 2021-04-13 RX ORDER — CEFAZOLIN SODIUM 1 G
2000 VIAL (EA) INJECTION EVERY 8 HOURS
Refills: 0 | Status: DISCONTINUED | OUTPATIENT
Start: 2021-04-13 | End: 2021-04-13

## 2021-04-13 RX ORDER — SERTRALINE 25 MG/1
1 TABLET, FILM COATED ORAL
Qty: 0 | Refills: 0 | DISCHARGE
Start: 2021-04-13

## 2021-04-13 RX ORDER — LEVOTHYROXINE SODIUM 125 MCG
1 TABLET ORAL
Qty: 0 | Refills: 0 | DISCHARGE
Start: 2021-04-13

## 2021-04-13 RX ORDER — LEVOTHYROXINE SODIUM 125 MCG
1 TABLET ORAL
Qty: 0 | Refills: 0 | DISCHARGE

## 2021-04-13 RX ORDER — QUETIAPINE FUMARATE 200 MG/1
1 TABLET, FILM COATED ORAL
Qty: 0 | Refills: 0 | DISCHARGE
Start: 2021-04-13

## 2021-04-13 RX ORDER — SERTRALINE 25 MG/1
1 TABLET, FILM COATED ORAL
Qty: 0 | Refills: 0 | DISCHARGE

## 2021-04-13 RX ORDER — CEFAZOLIN SODIUM 1 G
2 VIAL (EA) INJECTION
Qty: 0 | Refills: 0 | DISCHARGE
Start: 2021-04-13 | End: 2021-05-07

## 2021-04-13 RX ADMIN — SERTRALINE 100 MILLIGRAM(S): 25 TABLET, FILM COATED ORAL at 11:55

## 2021-04-13 RX ADMIN — ENOXAPARIN SODIUM 30 MILLIGRAM(S): 100 INJECTION SUBCUTANEOUS at 11:55

## 2021-04-13 RX ADMIN — Medication 100 MILLIGRAM(S): at 17:34

## 2021-04-13 RX ADMIN — Medication 88 MICROGRAM(S): at 07:03

## 2021-04-13 RX ADMIN — Medication 100 MILLIGRAM(S): at 10:17

## 2021-04-13 RX ADMIN — AMPICILLIN SODIUM AND SULBACTAM SODIUM 200 GRAM(S): 250; 125 INJECTION, POWDER, FOR SUSPENSION INTRAMUSCULAR; INTRAVENOUS at 04:37

## 2021-04-13 NOTE — PROGRESS NOTE ADULT - ASSESSMENT
80 year old female with sialadenitis 
81yo female with history of Alzheimers (A&Ox0 at baseline),  DMII presents for left neck swelling and found to have left submandibular and left parotid sialoadenitis with MSSA bacteremia.  MSSA common source of infection related to infected salivary gland in elderly.  Blood cultures repeated 4/9 - testing  ENT following    Suggest:    continue Unasyn q 6 h for now while await blood culture results   likely will need 4 week course Cefazolin   check TTE    Petra Morgan MD  Pager: [phone]  After 5 PM or weekends please call fellow on call or office [phone]        
79yo female with history of Alzheimers (A&Ox0 at baseline),  DMII presents for left neck swelling and found to have left submandibular and left parotid sialoadenitis with MSSA bacteremia.  MSSA common source of infection related to infected salivary gland in elderly.  Blood cultures repeated 4/9 - NGTD  ENT following. Submandibular swelling much better today.    Suggest:    continue Unasyn q 6 h for now.  likely will need 4 week course Cefazolin 2 grams IV q8 - planned end date 5/7/2021  check TTE    I am away starting tomorrow.  My associate will follow    Katie Garcia MD  [phone] (pager)  [phone] (office)           
79yo female with history of Alzheimers (A&Ox0 at baseline), hypothyroidism, DMII (not on any medications) transferred from [facility] for L. submandibular fullness with concern for sialdentitis. 
79yo female with history of Alzheimers (A&Ox0 at baseline), hypothyroidism, DMII (not on any medications) transferred from [facility] for L. submandibular fullness with concern for sialdentitis. ENT evaluated and recommended MASH rx and no surgery. Cousrse c/b MSSA bacteremia.
81yo female with history of Alzheimers (A&Ox0 at baseline), hypothyroidism, DMII (not on any medications) transferred from [facility] for L. submandibular fullness with concern for sialdentitis. 
79yo female with history of Alzheimers (A&Ox0 at baseline), hypothyroidism, DMII (not on any medications) transferred from [facility] for L. submandibular fullness with concern for sialdentitis. 
81yo female with history of Alzheimers (A&Ox0 at baseline), hypothyroidism, DMII (not on any medications) transferred from [facility] for L. submandibular fullness with concern for sialdentitis. ENT evaluated and recommended MASH rx and no surgery. Cousrse c/b MSSA bacteremia.

## 2021-04-13 NOTE — PROGRESS NOTE ADULT - PROBLEM SELECTOR PLAN 6
DVT ppx: SCDs  Diet: Soft with ensure, speech eval for further recs  Dispo: PT rec [facility] but family requesting to bring patient home DVT ppx: SCDs  Diet: Soft with ensure, speech eval for further recs  Dispo: [facility]

## 2021-04-13 NOTE — PROGRESS NOTE ADULT - PROBLEM SELECTOR PLAN 4
Patient with hx of frequent UTIs. Grossly positive UA with TNTC bacteria at [place]. Urine Cx showed Ecoli. Blood Cx showed Staph Aureus.   -Continue unasyn as above, sufficient for  bugs Patient with hx of frequent UTIs. Grossly positive UA with TNTC bacteria at [place]. Urine Cx showed Ecoli. Blood Cx showed Staph Aureus.   -s/p unasyn

## 2021-04-13 NOTE — PROGRESS NOTE ADULT - PROBLEM SELECTOR PROBLEM 2
Submandibular space infection

## 2021-04-13 NOTE — PROGRESS NOTE ADULT - PROBLEM SELECTOR PLAN 1
Presented w staph bacteremia from likely L. submandibular gland source.  -On unasyn IV  -repeat blood cx 4/9 NGTD  -echocardiogram to r/o endocarditis (low suspicion - shouldn't need GEORGE)  -ID c/s, appreciate recs, likely long-term cefazolin  -will likely need midline vs picc depending on TTE Presented w staph bacteremia from likely L. submandibular gland source.  -unasyn -> ancef  -will need ancef until 5/7 per ID. will place midline.  -repeat blood cx 4/9 NGTD  -echocardiogram negative  -ID c/s, appreciate recs, likely long-term cefazolin  -will likely need midline vs picc depending on TTE

## 2021-04-13 NOTE — PROGRESS NOTE ADULT - PROVIDER SPECIALTY LIST ADULT
Infectious Disease
ENT
Infectious Disease
ENT
Internal Medicine

## 2021-04-13 NOTE — DISCHARGE NOTE NURSING/CASE MANAGEMENT/SOCIAL WORK - PATIENT PORTAL LINK FT
You can access the FollowMyHealth Patient Portal offered by [facility] by registering at the following website: [email or web address]. By joining [employer]’s FollowMyHealth portal, you will also be able to view your health information using other applications (apps) compatible with our system.

## 2021-04-13 NOTE — PROGRESS NOTE ADULT - ATTENDING COMMENTS
79yo female with history of Alzheimer's dementia (A&Ox0 at baseline), hypothyroidism, DMII transferred from [facility] for L. submandibular fullness with concern for sialadenitis. Course c/b MSSA bacteremia.    -ID following, can switch abx to Ancef 2g IV q8h through 5/7/2021  -TTE with no vegetations noted  -Blood cultures with NGTD at 72h  -ENT following, no role for surgery currently  -Will plan for midline/PICC placement tomorrow  -D/c planning to [facility]    D/w Dr. Griffith
Patient seen and examined, care d/w HS.    80F with Alzheimer's (A&Ox0 at baseline), hypothyroidism, DMII (not on any medications, appears no longer has dx as HbA1c 5.5%) transferred from [facility] for L. submandibular fullness due sialadenitis c/b MSSA bacteremia  - c/w Unasyn blood cx 4/9 NG, pending TTE (refused 2/2 agitation on 4/11), ID following, likely needs 2-4 weeks abx; 4 set blood cx from 4/9 thus far NGTD  - E coli in urine, less likely an issues, suspect colonization, covered by current abx  - appreciate ENT f/u  no surgical intervention rec MASH (massage, antibiotics, sialagogues, hydration)   - TSH 9.70 with normal T4, levothyroxine to 88 mcg dose increase, repeat TFTs as outpt.    From home, PT rec [facility] unclear if family wants that  Dispo pending clearance, will need IV abx for 2-4 wks from clearance
79yo female with history of Alzheimers (A&Ox0 at baseline), hypothyroidism, DMII (not on any medications) transferred from [facility] for L. submandibular fullness with concern for sialdentitis. appreciate ID reccs, stop vanco c/w unasyn for MSSA bactermia, sialadenitis and UTI w/ ucx growing E.coli, pending sensitivities. f/u repeat bcx. check TTE, c/w warm compress. per ENT, no surgical intervention. TSH 9.70, increase levothyroxine to 88 mcg, check T3 and T4, repeat TFTs as outpt.
Patient seen and examined, care d/w HS.    80F with Alzheimer's (A&Ox0 at baseline), hypothyroidism, DMII (not on any medications, appears no longer has dx as HbA1c 5.5%) transferred from [facility] for L. submandibular fullness due sialadenitis c/b MSSA bacteremia  - c/w usazyn blood cx 4/9 NG, pending TTE, ID following, likely needs for weeks abx  - E coli in urine, less likely an issues, suspect colonization, covered by current abx  - appreciate ENT f/u  no surgical intervention  - TSH 9.70 with normal T4, levothyroxine to 88 mcg dose increase, repeat TFTs as outpt.    From home, PT rec [facility] unclear if family wants that  Dispo pending clearance, will need IV abx for 4 wks from clearance
81yo female with history of Alzheimer's dementia (A&Ox0 at baseline), hypothyroidism, DMII transferred from [facility] for L. submandibular fullness with concern for sialadenitis. Course c/b MSSA bacteremia.    -ID following, c/w Ancef 2g IV q8h through 5/7/2021; midline placed today  -TTE with no vegetations noted  -D/c planning to [facility]    D/w Dr. Griffith

## 2021-04-13 NOTE — PROGRESS NOTE ADULT - PROBLEM SELECTOR PROBLEM 1
Submandibular space infection
Bacteremia due to Staphylococcus aureus

## 2021-04-13 NOTE — PROGRESS NOTE ADULT - SUBJECTIVE AND OBJECTIVE BOX
*******************************************************  Kb Griffith MD PGY-1  Internal Medicine  Pager ([facility]) [phone] / (UUS) [phone]  *******************************************************  Patient is a 80y old  Female who presents with a chief complaint of Sialadenitis (12 Apr 2021 14:15)      PI (_): #    INTERVAL EVENTS (since last progress note)  -    SUBJECTIVE / OVERNIGHT EVENTS:  - No acute events overnight.   - Patient seen and evaluated at bedside.  -   - ROS: Denies fevers/chills, headache, SOB at rest, cough, chest pain, palpitations, abdominal pain, nausea/vomiting/diarrhea/constipation, melena/hematochezia, dysuria, and hematuria    ------------------------------------------------------------------------------------------------------------    MEDICATIONS  (STANDING):  ceFAZolin   IVPB 2000 milliGRAM(s) IV Intermittent every 8 hours  dextrose 40% Gel 15 Gram(s) Oral once  dextrose 5%. 1000 milliLiter(s) (50 mL/Hr) IV Continuous <Continuous>  dextrose 5%. 1000 milliLiter(s) (100 mL/Hr) IV Continuous <Continuous>  dextrose 50% Injectable 25 Gram(s) IV Push once  dextrose 50% Injectable 12.5 Gram(s) IV Push once  dextrose 50% Injectable 25 Gram(s) IV Push once  enoxaparin Injectable 30 milliGRAM(s) SubCutaneous daily  glucagon  Injectable 1 milliGRAM(s) IntraMuscular once  insulin lispro (ADMELOG) corrective regimen sliding scale   SubCutaneous three times a day before meals  levothyroxine 88 MICROGram(s) Oral daily  sertraline 100 milliGRAM(s) Oral daily    MEDICATIONS  (PRN):  acetaminophen    Suspension .. 650 milliGRAM(s) Oral every 6 hours PRN Temp greater or equal to 38C (100.4F), Mild Pain (1 - 3)  OLANZapine Injectable 1.25 milliGRAM(s) IntraMuscular once PRN echocardiogram  QUEtiapine 25 milliGRAM(s) Oral at bedtime PRN [place]/agitation      ------------------------------------------------------------------------------------------------------------    OBJECTIVE:    CAPILLARY BLOOD GLUCOSE      POCT Blood Glucose.: 100 mg/dL (12 Apr 2021 21:49)  POCT Blood Glucose.: 114 mg/dL (12 Apr 2021 17:46)  POCT Blood Glucose.: 126 mg/dL (12 Apr 2021 12:15)    I&O's Summary    12 Apr 2021 07:01  -  13 Apr 2021 07:00  --------------------------------------------------------  IN: 200 mL / OUT: 0 mL / NET: 200 mL      Daily     Daily   Weight (kg): 51.4 (04-08-21 @ 06:09), 56.7 (04-07-21 @ 13:55)    PHYSICAL EXAM:  T(F): 97.6, Max: 97.6 (04-13-21 @ 05:54)  HR: 77 (76 - 82)  BP: 116/71 (100/60 - 116/71)  RR: 18 (17 - 18)  SpO2: 97% (97% - 100%)      CONSTITUTIONAL: NAD, well-developed  HEENT: NCAT, EOMI, PERRLA, no scleral icterus, MMM  RESPIRATORY/CHEST: Normal respiratory effort; lungs are clear to auscultation bilaterally; no wheezing/crackles  CARDIO: Regular rate, normal S1 and S2, no murmur/rub/gallop; No JVD  VASCULAR: No lower extremity edema; Peripheral pulses are 2+ bilaterally; Capillary refill brisk  ABDOMEN: Soft, nontender to palpation, normoactive bowel sounds, no rebound/guarding; No hepatosplenomegaly  MUSCULOSKELETAL: no joint swelling or tenderness to palpation, full strength all extremities.  EXTREMITIES: hands/feet are warm, and without cyanosis or clubbing  SKIN: no visible rashes, pallor, diaphoresis, or jaundice  NEURO: No focal deficits; moving all extremities  PSYCH: A+O to person, place, and time; affect appropriate; cooperative    ------------------------------------------------------------------------------------------------------------  LABS:                        13.6   10.53 )-----------( 236      ( 12 Apr 2021 09:10 )             41.3     04-13    134<L>  |  102  |  13  ----------------------------<  107<H>  4.7   |  21<L>  |  0.69    Ca    9.2      13 Apr 2021 08:16  Phos  3.2     04-13  Mg     2.4     04-13                  RADIOLOGY & ADDITIONAL TESTS:  Results Reviewed:     Imaging Personally Reviewed:  Electrocardiogram Personally Reviewed:      COORDINATION OF CARE:  Care discussed with consultants/other providers and notes reviewed [Y]:   ------------------------------------------------------------------------------------------------------------     *******************************************************  Kb Griffith MD PGY-1  Internal Medicine  Pager ([facility]) [phone] / (FRG) [phone]  *******************************************************  Patient is a 80y old  Female who presents with a chief complaint of Sialadenitis (12 Apr 2021 14:15)        INTERVAL EVENTS (since last progress note)  - echo yesterday without e/o endocarditis    SUBJECTIVE / OVERNIGHT EVENTS:  - No acute events overnight.   - Patient seen and evaluated at bedside.  - no complaints this morning  - ROS: Denies fevers/chills, headache, SOB at rest, cough, chest pain, palpitations, abdominal pain, nausea/vomiting/diarrhea/constipation, melena/hematochezia, dysuria, and hematuria    ------------------------------------------------------------------------------------------------------------    MEDICATIONS  (STANDING):  ceFAZolin   IVPB 2000 milliGRAM(s) IV Intermittent every 8 hours  dextrose 40% Gel 15 Gram(s) Oral once  dextrose 5%. 1000 milliLiter(s) (50 mL/Hr) IV Continuous <Continuous>  dextrose 5%. 1000 milliLiter(s) (100 mL/Hr) IV Continuous <Continuous>  dextrose 50% Injectable 25 Gram(s) IV Push once  dextrose 50% Injectable 12.5 Gram(s) IV Push once  dextrose 50% Injectable 25 Gram(s) IV Push once  enoxaparin Injectable 30 milliGRAM(s) SubCutaneous daily  glucagon  Injectable 1 milliGRAM(s) IntraMuscular once  insulin lispro (ADMELOG) corrective regimen sliding scale   SubCutaneous three times a day before meals  levothyroxine 88 MICROGram(s) Oral daily  sertraline 100 milliGRAM(s) Oral daily    MEDICATIONS  (PRN):  acetaminophen    Suspension .. 650 milliGRAM(s) Oral every 6 hours PRN Temp greater or equal to 38C (100.4F), Mild Pain (1 - 3)  OLANZapine Injectable 1.25 milliGRAM(s) IntraMuscular once PRN echocardiogram  QUEtiapine 25 milliGRAM(s) Oral at bedtime PRN [place]/agitation      ------------------------------------------------------------------------------------------------------------    OBJECTIVE:    CAPILLARY BLOOD GLUCOSE      POCT Blood Glucose.: 100 mg/dL (12 Apr 2021 21:49)  POCT Blood Glucose.: 114 mg/dL (12 Apr 2021 17:46)  POCT Blood Glucose.: 126 mg/dL (12 Apr 2021 12:15)    I&O's Summary    12 Apr 2021 07:01  -  13 Apr 2021 07:00  --------------------------------------------------------  IN: 200 mL / OUT: 0 mL / NET: 200 mL      Daily     Daily   Weight (kg): 51.4 (04-08-21 @ 06:09), 56.7 (04-07-21 @ 13:55)    PHYSICAL EXAM:  T(F): 97.6, Max: 97.6 (04-13-21 @ 05:54)  HR: 77 (76 - 82)  BP: 116/71 (100/60 - 116/71)  RR: 18 (17 - 18)  SpO2: 97% (97% - 100%)      CONSTITUTIONAL: NAD, well-developed  HEENT: + tender swelling on L neck/mandible. NCAT, EOMI, PERRLA, no scleral icterus, MMM  RESPIRATORY/CHEST: Normal respiratory effort; lungs are clear to auscultation bilaterally; no wheezing/crackles  CARDIO: Regular rate, normal S1 and S2, no murmur/rub/gallop; No JVD  VASCULAR: No lower extremity edema; Peripheral pulses are 2+ bilaterally; Capillary refill brisk  ABDOMEN: Soft, nontender to palpation, normoactive bowel sounds, no rebound/guarding; No hepatosplenomegaly  MUSCULOSKELETAL: no joint swelling or tenderness to palpation, full strength all extremities.  EXTREMITIES: hands/feet are warm, and without cyanosis or clubbing  SKIN: no visible rashes, pallor, diaphoresis, or jaundice  NEURO: No focal deficits; moving all extremities  PSYCH: A+O 0-1    ------------------------------------------------------------------------------------------------------------  LABS:                        13.6   10.53 )-----------( 236      ( 12 Apr 2021 09:10 )             41.3     04-13    134<L>  |  102  |  13  ----------------------------<  107<H>  4.7   |  21<L>  |  0.69    Ca    9.2      13 Apr 2021 08:16  Phos  3.2     04-13  Mg     2.4     04-13                  RADIOLOGY & ADDITIONAL TESTS:  Results Reviewed:     Imaging Personally Reviewed:  Electrocardiogram Personally Reviewed:      COORDINATION OF CARE:  Care discussed with consultants/other providers and notes reviewed [Y]:   ------------------------------------------------------------------------------------------------------------

## 2021-04-13 NOTE — ADVANCED PRACTICE NURSE CONSULT - ASSESSMENT
Patient is confused. Midline insertion along with risks, benefits, possible complications and infection prevention explained to Spouse Irineo @ [phone] on 4/13/21 at 11:34  who verbalized understanding. All questions addressed and spouse gave verbal consent to place midline. Right arm cleansed with CHG. Using sterile technique under ultra sound guidance, placed PowerGlide Pro Midline 20G /10cm into Right Basilic vein. Brisk blood return and flushed with 20Mls of normal saline. Minimal blood loss and patient tolerated procedure well. CHG dressing placed. All sharps accounted for. Report given to district RN and ordering provider. LOT#: [id number], REF#: [id number]

## 2021-04-13 NOTE — ADVANCED PRACTICE NURSE CONSULT - REASON FOR CONSULT
----- Message from JOY Pelayo sent at 1/27/2017  7:51 AM PST -----  Please inform patient:  Labs stable. No significant change from prior check.   midline insertion

## 2021-04-13 NOTE — PROGRESS NOTE ADULT - PROBLEM SELECTOR PLAN 5
Hx of Alzheimers, A&Ox0 at baseline. Not on any meds per family.  -Seroquel 25mg prn sundown/agitation  -Place in enhanced supervision room

## 2021-04-13 NOTE — PROGRESS NOTE ADULT - PROBLEM SELECTOR PLAN 2
Patient transferred from [facility] for ENT evaluation of left sided sialadentitis vs concern for underlying tumor  -Per ENT no acute surgical intervention  -Continue warm compresses, manual massage of gland, sialogogues. Obtain culture of secretions at [facility]  -c/w unasyn 3g q6h  -Tylenol q6h for pain control  -Blood cx negative  -if SOB or shaking chills will need to consider Lemierre syndrome Patient transferred from [facility] for ENT evaluation of left sided sialadentitis vs concern for underlying tumor  -Per ENT no acute surgical intervention  -Continue warm compresses, manual massage of gland, sialogogues.  -Tylenol q6h for pain control  -Blood cx negative  -if SOB or shaking chills will need to consider Lemierre syndrome

## 2021-04-13 NOTE — PROGRESS NOTE ADULT - REASON FOR ADMISSION
Sialadenitis

## 2021-04-14 LAB
CULTURE RESULTS: SIGNIFICANT CHANGE UP
SPECIMEN SOURCE: SIGNIFICANT CHANGE UP

## 2021-04-29 ENCOUNTER — EMERGENCY (EMERGENCY)
Facility: HOSPITAL | Age: 81
LOS: 0 days | Discharge: HOME | End: 2021-04-29
Attending: EMERGENCY MEDICINE
Payer: MEDICARE

## 2021-04-29 VITALS
OXYGEN SATURATION: 96 % | TEMPERATURE: 98 F | HEART RATE: 88 BPM | SYSTOLIC BLOOD PRESSURE: 134 MMHG | HEIGHT: 64 IN | WEIGHT: 134.92 LBS | RESPIRATION RATE: 18 BRPM | DIASTOLIC BLOOD PRESSURE: 61 MMHG

## 2021-04-29 VITALS — DIASTOLIC BLOOD PRESSURE: 70 MMHG | HEART RATE: 80 BPM | SYSTOLIC BLOOD PRESSURE: 130 MMHG

## 2021-04-29 DIAGNOSIS — S09.8XXA OTHER SPECIFIED INJURIES OF HEAD, INITIAL ENCOUNTER: ICD-10-CM

## 2021-04-29 DIAGNOSIS — E03.9 HYPOTHYROIDISM, UNSPECIFIED: ICD-10-CM
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
## 2021-04-29 DIAGNOSIS — Z87.440 PERSONAL HISTORY OF URINARY (TRACT) INFECTIONS: ICD-10-CM

## 2021-04-29 DIAGNOSIS — K57.90 DIVERTICULOSIS OF INTESTINE, PART UNSPECIFIED, WITHOUT PERFORATION OR ABSCESS WITHOUT BLEEDING: ICD-10-CM

## 2021-04-29 DIAGNOSIS — F02.80 DEMENTIA IN OTHER DISEASES CLASSIFIED ELSEWHERE, UNSPECIFIED SEVERITY, WITHOUT BEHAVIORAL DISTURBANCE, PSYCHOTIC DISTURBANCE, MOOD DISTURBANCE, AND ANXIETY: ICD-10-CM

## 2021-04-29 DIAGNOSIS — G30.9 ALZHEIMER'S DISEASE, UNSPECIFIED: ICD-10-CM

## 2021-04-29 DIAGNOSIS — S09.90XA UNSPECIFIED INJURY OF HEAD, INITIAL ENCOUNTER: ICD-10-CM

## 2021-04-29 DIAGNOSIS — E11.9 TYPE 2 DIABETES MELLITUS WITHOUT COMPLICATIONS: ICD-10-CM

## 2021-04-29 DIAGNOSIS — S01.01XA LACERATION WITHOUT FOREIGN BODY OF SCALP, INITIAL ENCOUNTER: ICD-10-CM

## 2021-04-29 DIAGNOSIS — Z98.42 CATARACT EXTRACTION STATUS, LEFT EYE: Chronic | ICD-10-CM
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
## 2021-04-29 DIAGNOSIS — E78.00 PURE HYPERCHOLESTEROLEMIA, UNSPECIFIED: ICD-10-CM
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
## 2021-04-29 DIAGNOSIS — Z79.01 LONG TERM (CURRENT) USE OF ANTICOAGULANTS: ICD-10-CM

## 2021-04-29 LAB
ALBUMIN SERPL ELPH-MCNC: 3.5 G/DL — SIGNIFICANT CHANGE UP (ref 3.3–5)
ALP SERPL-CCNC: 64 U/L — SIGNIFICANT CHANGE UP (ref 40–120)
ALT FLD-CCNC: 11 U/L — LOW (ref 12–78)
ANION GAP SERPL CALC-SCNC: 6 MMOL/L — SIGNIFICANT CHANGE UP (ref 5–17)
APPEARANCE UR: ABNORMAL
APTT BLD: 39.8 SEC — HIGH (ref 27.5–35.5)
AST SERPL-CCNC: 24 U/L — SIGNIFICANT CHANGE UP (ref 15–37)
BASOPHILS # BLD AUTO: 0.08 K/UL — SIGNIFICANT CHANGE UP (ref 0–0.2)
BASOPHILS NFR BLD AUTO: 0.6 % — SIGNIFICANT CHANGE UP (ref 0–2)
BILIRUB SERPL-MCNC: 0.7 MG/DL — SIGNIFICANT CHANGE UP (ref 0.2–1.2)
BILIRUB UR-MCNC: NEGATIVE — SIGNIFICANT CHANGE UP
BUN SERPL-MCNC: 12 MG/DL — SIGNIFICANT CHANGE UP (ref 7–23)
CALCIUM SERPL-MCNC: 9.8 MG/DL — SIGNIFICANT CHANGE UP (ref 8.5–10.1)
CHLORIDE SERPL-SCNC: 106 MMOL/L — SIGNIFICANT CHANGE UP (ref 96–108)
CO2 SERPL-SCNC: 27 MMOL/L — SIGNIFICANT CHANGE UP (ref 22–31)
COLOR SPEC: YELLOW — SIGNIFICANT CHANGE UP
CREAT SERPL-MCNC: 0.9 MG/DL — SIGNIFICANT CHANGE UP (ref 0.5–1.3)
DIFF PNL FLD: ABNORMAL
EOSINOPHIL # BLD AUTO: 0.01 K/UL — SIGNIFICANT CHANGE UP (ref 0–0.5)
EOSINOPHIL NFR BLD AUTO: 0.1 % — SIGNIFICANT CHANGE UP (ref 0–6)
GLUCOSE SERPL-MCNC: 106 MG/DL — HIGH (ref 70–99)
GLUCOSE UR QL: NEGATIVE MG/DL — SIGNIFICANT CHANGE UP
HCT VFR BLD CALC: 42.3 % — SIGNIFICANT CHANGE UP (ref 34.5–45)
HGB BLD-MCNC: 13.4 G/DL — SIGNIFICANT CHANGE UP (ref 11.5–15.5)
IMM GRANULOCYTES NFR BLD AUTO: 0.9 % — SIGNIFICANT CHANGE UP (ref 0–1.5)
INR BLD: 1.14 RATIO — SIGNIFICANT CHANGE UP (ref 0.88–1.16)
KETONES UR-MCNC: NEGATIVE — SIGNIFICANT CHANGE UP
LEUKOCYTE ESTERASE UR-ACNC: ABNORMAL
LYMPHOCYTES # BLD AUTO: 28.2 % — SIGNIFICANT CHANGE UP (ref 13–44)
LYMPHOCYTES # BLD AUTO: 3.51 K/UL — HIGH (ref 1–3.3)
MCHC RBC-ENTMCNC: 28.7 PG — SIGNIFICANT CHANGE UP (ref 27–34)
MCHC RBC-ENTMCNC: 31.7 GM/DL — LOW (ref 32–36)
MCV RBC AUTO: 90.6 FL — SIGNIFICANT CHANGE UP (ref 80–100)
MONOCYTES # BLD AUTO: 0.82 K/UL — SIGNIFICANT CHANGE UP (ref 0–0.9)
MONOCYTES NFR BLD AUTO: 6.6 % — SIGNIFICANT CHANGE UP (ref 2–14)
NEUTROPHILS # BLD AUTO: 7.9 K/UL — HIGH (ref 1.8–7.4)
NEUTROPHILS NFR BLD AUTO: 63.6 % — SIGNIFICANT CHANGE UP (ref 43–77)
NITRITE UR-MCNC: NEGATIVE — SIGNIFICANT CHANGE UP
PH UR: 7 — SIGNIFICANT CHANGE UP (ref 5–8)
PLATELET # BLD AUTO: 285 K/UL — SIGNIFICANT CHANGE UP (ref 150–400)
POTASSIUM SERPL-MCNC: 4.1 MMOL/L — SIGNIFICANT CHANGE UP (ref 3.5–5.3)
POTASSIUM SERPL-SCNC: 4.1 MMOL/L — SIGNIFICANT CHANGE UP (ref 3.5–5.3)
PROT SERPL-MCNC: 7.5 GM/DL — SIGNIFICANT CHANGE UP (ref 6–8.3)
PROT UR-MCNC: 15 MG/DL
PROTHROM AB SERPL-ACNC: 13.2 SEC — SIGNIFICANT CHANGE UP (ref 10.6–13.6)
RBC # BLD: 4.67 M/UL — SIGNIFICANT CHANGE UP (ref 3.8–5.2)
RBC # FLD: 15.4 % — HIGH (ref 10.3–14.5)
SARS-COV-2 RNA SPEC QL NAA+PROBE: SIGNIFICANT CHANGE UP
SODIUM SERPL-SCNC: 139 MMOL/L — SIGNIFICANT CHANGE UP (ref 135–145)
SP GR SPEC: 1.01 — SIGNIFICANT CHANGE UP (ref 1.01–1.02)
TROPONIN I SERPL-MCNC: <0.015 NG/ML — SIGNIFICANT CHANGE UP (ref 0.01–0.04)
UROBILINOGEN FLD QL: NEGATIVE MG/DL — SIGNIFICANT CHANGE UP
WBC # BLD: 12.43 K/UL — HIGH (ref 3.8–10.5)
WBC # FLD AUTO: 12.43 K/UL — HIGH (ref 3.8–10.5)

## 2021-04-29 PROCEDURE — 70450 CT HEAD/BRAIN W/O DYE: CPT | Mod: 26

## 2021-04-29 PROCEDURE — 12001 RPR S/N/AX/GEN/TRNK 2.5CM/<: CPT

## 2021-04-29 PROCEDURE — 81001 URINALYSIS AUTO W/SCOPE: CPT

## 2021-04-29 PROCEDURE — U0005: CPT

## 2021-04-29 PROCEDURE — 85730 THROMBOPLASTIN TIME PARTIAL: CPT

## 2021-04-29 PROCEDURE — 71045 X-RAY EXAM CHEST 1 VIEW: CPT | Mod: 26

## 2021-04-29 PROCEDURE — 71045 X-RAY EXAM CHEST 1 VIEW: CPT

## 2021-04-29 PROCEDURE — 72125 CT NECK SPINE W/O DYE: CPT | Mod: 26

## 2021-04-29 PROCEDURE — 99285 EMERGENCY DEPT VISIT HI MDM: CPT | Mod: CS,25

## 2021-04-29 PROCEDURE — 82550 ASSAY OF CK (CPK): CPT

## 2021-04-29 PROCEDURE — 36415 COLL VENOUS BLD VENIPUNCTURE: CPT

## 2021-04-29 PROCEDURE — 12001 RPR S/N/AX/GEN/TRNK 2.5CM/<: CPT | Mod: GC

## 2021-04-29 PROCEDURE — 80053 COMPREHEN METABOLIC PANEL: CPT

## 2021-04-29 PROCEDURE — 99284 EMERGENCY DEPT VISIT MOD MDM: CPT | Mod: 25

## 2021-04-29 PROCEDURE — 84484 ASSAY OF TROPONIN QUANT: CPT

## 2021-04-29 PROCEDURE — 85610 PROTHROMBIN TIME: CPT

## 2021-04-29 PROCEDURE — U0003: CPT

## 2021-04-29 PROCEDURE — 93005 ELECTROCARDIOGRAM TRACING: CPT | Mod: XU

## 2021-04-29 PROCEDURE — 72125 CT NECK SPINE W/O DYE: CPT

## 2021-04-29 PROCEDURE — 87086 URINE CULTURE/COLONY COUNT: CPT

## 2021-04-29 PROCEDURE — 93010 ELECTROCARDIOGRAM REPORT: CPT

## 2021-04-29 PROCEDURE — 96374 THER/PROPH/DIAG INJ IV PUSH: CPT | Mod: XU

## 2021-04-29 PROCEDURE — 70450 CT HEAD/BRAIN W/O DYE: CPT

## 2021-04-29 PROCEDURE — 85025 COMPLETE CBC W/AUTO DIFF WBC: CPT

## 2021-04-29 PROCEDURE — 72170 X-RAY EXAM OF PELVIS: CPT

## 2021-04-29 PROCEDURE — 72170 X-RAY EXAM OF PELVIS: CPT | Mod: 26

## 2021-04-29 RX ORDER — CEPHALEXIN 500 MG
1 CAPSULE ORAL
Qty: 10 | Refills: 0
Start: 2021-04-29 | End: 2021-05-03

## 2021-04-29 RX ORDER — ONDANSETRON 8 MG/1
4 TABLET, FILM COATED ORAL ONCE
Refills: 0 | Status: COMPLETED | OUTPATIENT
Start: 2021-04-29 | End: 2021-04-29

## 2021-04-29 RX ORDER — SODIUM CHLORIDE 9 MG/ML
1000 INJECTION INTRAMUSCULAR; INTRAVENOUS; SUBCUTANEOUS ONCE
Refills: 0 | Status: COMPLETED | OUTPATIENT
Start: 2021-04-29 | End: 2021-04-29

## 2021-04-29 RX ADMIN — SODIUM CHLORIDE 1000 MILLILITER(S): 9 INJECTION INTRAMUSCULAR; INTRAVENOUS; SUBCUTANEOUS at 12:32

## 2021-04-29 RX ADMIN — ONDANSETRON 4 MILLIGRAM(S): 8 TABLET, FILM COATED ORAL at 12:31

## 2021-04-29 NOTE — ED PROVIDER NOTE - OBJECTIVE STATEMENT
79 y/o female with a PMHx of Alzheimer (A&Ox0 at baseline), hypothyroidism, DMII (not on any medications), [facility]itnessed [facility] this morning. Per Macy from facility, pt was on the toilet and was told not to get up while the aide stepped out of the bathroom and the aide heard the pt fall and went back to the room and found pt has fallen and hit her head. Pt with laceration to scalp. Pt had 3 episodes of vomiting PTA. Per EMS, pt is at mental baseline. Pt is on Lovenox for a prophylaxis.

## 2021-04-29 NOTE — ED PROVIDER NOTE - CLINICAL SUMMARY MEDICAL DECISION MAKING FREE TEXT BOX
89 y/o female with hx of dementia s/p unwitnessed fall. Pt tried to get up from toilet by herself and hit her head. Hx unable to obtain from pt. Pt with laceration to occiput. Plan; labs, lac repair, CT reassess.

## 2021-04-29 NOTE — ED PROVIDER NOTE - PROGRESS NOTE DETAILS
at bedside pt at baseline. ct negative lac repaired. will dc. ambulatory at baseline. faiza Pittman M.D., Attending Physician  at bedside pt at baseline. ct negative lac repaired. will dc. ambulatory at baseline. vss . tolerating PO yogurt. Gaston Pittman M.D., Attending Physician

## 2021-04-29 NOTE — ED PROVIDER NOTE - NSFOLLOWUPINSTRUCTIONS_ED_ALL_ED_FT
1. return for worsening symptoms or anything concerning to you  2. take all home meds as prescribed  3. follow up with your pmd call to make an appointment  4. take keflex as directed    Head Injury    WHAT YOU NEED TO KNOW:    A head injury is most often caused by a blow to the head. This may occur from a fall, bicycle injury, sports injury, being struck in the head, or a motor vehicle accident.     DISCHARGE INSTRUCTIONS:    Call 911 or have someone else call for any of the following:     You cannot be woken.      You have a seizure.      You stop responding to others or you faint.      You have blurry or double vision.      Your speech becomes slurred or confused.      You have arm or leg weakness, loss of feeling, or new problems with coordination.      Your pupils are larger than usual or one pupil is a different size than the other.       You have blood or clear fluid coming out of your ears or nose.    Return to the emergency department if:     You have repeated or forceful vomiting.      You feel confused.      Your headache gets worse or becomes severe.      You or someone caring for you notices that you are harder to wake than usual.    Contact your healthcare provider if:     Your symptoms last longer than 6 weeks after the injury.      You have questions or concerns about your condition or care.    Medicines:     Acetaminophen decreases pain. Acetaminophen is available without a doctor's order. Ask how much to take and how often to take it. Follow directions. Acetaminophen can cause liver damage if not taken correctly.      Take your medicine as directed. Contact your healthcare provider if you think your medicine is not helping or if you have side effects. Tell him or her if you are allergic to any medicine. Keep a list of the medicines, vitamins, and herbs you take. Include the amounts, and when and why you take them. Bring the list or the pill bottles to follow-up visits. Carry your medicine list with you in case of an emergency.    Self-care:     Rest or do quiet activities for 24 to 48 hours. Limit your time watching TV, using the computer, or doing tasks that require a lot of thinking. Slowly return to your normal activities as directed. Do not play sports or do activities that may cause you to get hit in the head. Ask your healthcare provider when you can return to sports.       Apply ice on your head for 15 to 20 minutes every hour or as directed. Use an ice pack, or put crushed ice in a plastic bag. Cover it with a towel before you apply it to your skin. Ice helps prevent tissue damage and decreases swelling and pain.       Have someone stay with you for 24 hours or as directed. This person can monitor you for complications and call 911. When you are awake the person should ask you a few questions to see if you are thinking clearly. An example would be to ask your name or your address.     Prevent another head injury:     Wear a helmet that fits properly. Do this when you play sports, or ride a bike, scooter, or skateboard. Helmets help decrease your risk of a serious head injury. Talk to your healthcare provider about other ways you can protect yourself if you play sports.      Wear your seat belt every time you are in a car. This helps to decrease your risk for a head injury if you are in a car accident.     Follow up with your healthcare provider as directed: Write down your questions so you remember to ask them during your visits.

## 2021-04-29 NOTE — ED PROVIDER NOTE - PMH
Acute type 2 diabetes mellitus with manifestations  boderline- diet controlled  Alzheimer disease    Ankle fracture  s/p right- cast  Diverticulosis    Fibromyalgia    Frequent UTI    H/O: hypothyroidism    Hypercholesterolemia    Hypothyroidism

## 2021-04-29 NOTE — ED PROVIDER NOTE - PATIENT PORTAL LINK FT
You can access the FollowMyHealth Patient Portal offered by [facility] by registering at the following website: [email or web address]. By joining [employer]’s FollowMyHealth portal, you will also be able to view your health information using other applications (apps) compatible with our system.

## 2021-04-29 NOTE — ED PROVIDER NOTE - NS_ ATTENDINGSCRIBEDETAILS _ED_A_ED_FT
I, Gaston Pittman MD,  performed the initial face to face bedside interview with this patient regarding history of present illness, review of symptoms and relevant past medical, social and family history.  I completed an independent physical examination.  I was the initial provider who evaluated this patient.  The history, relevant review of systems, past medical and surgical history, medical decision making, and physical examination was documented by the scribe in my presence and I attest to the accuracy of the documentation.

## 2021-04-29 NOTE — ED PROVIDER NOTE - PSH
Diverticulitis of sigmoid colon  s/p sigmoidectomy  History of cholecystectomy    History of tonsillectomy    No significant past surgical history    S/P cataract surgery, left

## 2021-04-29 NOTE — ED ADULT NURSE NOTE - CHIEF COMPLAINT QUOTE
Pt BIBA from [facility] with unwitnessed fall. Per report from [facility], pt vomited x3 with laceration to scalp and possible c/o pain to left arm. Pt alert with baseline level of confusion per nursing home staff report.  TA called with RN and MD notified.

## 2021-04-29 NOTE — ED ADULT TRIAGE NOTE - CHIEF COMPLAINT QUOTE
Pt BIBA from [facility] with unwitnessed fall. Per report from [facility], pt vomited x3 with laceration to scalp and possible c/o pain to left arm. Pt alert with baseline level of confusion per nursing home staff report.  TA called with RN and MD notified.

## 2021-04-29 NOTE — ED PROVIDER NOTE - PHYSICAL EXAMINATION
Constitutional: NAD AAOx0  Eyes: PERRLA EOMI  Head: Normocephalic, +laceration to occiput   ENT: No espinoza sign, no raccoon eyes, no hemotympanum, no csf rhinorrhea, no nasal septal hematoma  Mouth: MMM  Cardiac: regular rate   Resp: Lungs CTAB  GI: Abd s/nt/nd  Neuro: CN2-12 intact GCS 15  Skin: No rashes, no bruising to chest, back, abdomen or extremities  Msk: No midline spinal ttp, full ROM of neck, c-collar cleared clinically and with provocative testing, no ttp of facial bones, no ttp to chest wall, pelvis stable, full ROM of all extremities without any ttp of extremities Constitutional: NAD AAOx0  Eyes: PERRLA EOMI  Head: Normocephalic, +laceration to occiput   ENT: No espinoza sign, no raccoon eyes,   Mouth: MMM  Cardiac: regular rate   Resp: Lungs CTAB  GI: Abd s/nt/nd  Neuro: CN2-12 intact GCS 15  Skin: No rashes, no bruising to chest, back, abdomen or extremities  Msk: No midline spinal ttp, no ttp of facial bones, no ttp to chest wall, pelvis stable, full ROM of all extremities without any ttp of extremities

## 2021-05-01 LAB
CULTURE RESULTS: SIGNIFICANT CHANGE UP
SPECIMEN SOURCE: SIGNIFICANT CHANGE UP

## 2022-07-21 NOTE — ED ADULT TRIAGE NOTE - NSWEIGHTCALCTOOLDRUG_GEN_A_CORE
[FreeTextEntry1] : HSAT 6/26/22: MARIELENA 23.8. \par \par PFT 7/21/22: Spirometry was normal. Lung volumes were normal. Diffusion capacity was normal. No significant improvement after bronchodilator. \par   used

## 2022-08-22 NOTE — H&P ADULT - NSICDXPASTSURGICALHX_GEN_ALL_CORE_FT
cooperative PAST SURGICAL HISTORY:  Diverticulitis of sigmoid colon s/p sigmoidectomy    History of cholecystectomy     History of tonsillectomy     S/P cataract surgery, left

## 2023-02-16 NOTE — PHYSICAL THERAPY INITIAL EVALUATION ADULT - PLANNED THERAPY INTERVENTIONS, PT EVAL
losartan (COZAAR) 50 MG tablet  50 mg, DAILY 3 ordered         Summary: TAKE 1 TABLET BY MOUTH DAILY  [place], Disp-90 tablet, R-3  **Patient requests 90 days supply**     Dose, Route, Frequency: 50 mg, Oral, DAILY  Start: 11/10/2022           balance training/bed mobility training/gait training/strengthening/transfer training

## 2023-02-22 NOTE — GOALS OF CARE CONVERSATION - ADVANCED CARE PLANNING - CONVERSATION DETAILS
Myalgia Monitoring: I explained this is common when taking isotretinoin. If this worsens they will contact us. Team spoke with pts family via phone to discuss goals of care, assist with planning and provide supportive counseling. Pt. was residing home with her  prior to admission. Pt. has history of dementia but was able to walk, dress herself and complete her ADLs with little assistance. Mentally pt. was confused at times and could not recognize her grandchildren. Pts  is primary care taker but they have someone come one day a week to be with pt. and give him a break.     Pts current medical condition and goals of care discussed. Pts family would like to take pt. home upon d/c. They are not ready for a comfort focused plan at this time and would like pt. to come back to the hospital if necessary. We reviewed levels of home care that were appropriate for pt. at this time including basic home care and Palliative. Pts family is in agreement with pt. returning home with home Palliative. Pt. is not ready for hospice services at this time.     Team reviewed and completed MOLST form with pts /HCP. MOLST states CPR, Trial of intubation and Noninvasive ventilation, Limited Medical, Send to hospital, No feeding tube, Trial of IV fluids, Determine use of antibiotics, Ok for transfusions.     Plan at this time is for pt. to return home with home Palliative Care. Emotional support provided. Our team will continue to follow.

## 2024-01-01 ENCOUNTER — INPATIENT (INPATIENT)
Facility: HOSPITAL | Age: 84
LOS: 15 days | DRG: 871 | End: 2024-12-26
Attending: STUDENT IN AN ORGANIZED HEALTH CARE EDUCATION/TRAINING PROGRAM | Admitting: INTERNAL MEDICINE
Payer: MEDICARE

## 2024-01-01 VITALS
OXYGEN SATURATION: 74 % | SYSTOLIC BLOOD PRESSURE: 74 MMHG | TEMPERATURE: 99 F | DIASTOLIC BLOOD PRESSURE: 39 MMHG | RESPIRATION RATE: 12 BRPM | HEART RATE: 50 BPM

## 2024-01-01 VITALS
DIASTOLIC BLOOD PRESSURE: 48 MMHG | RESPIRATION RATE: 18 BRPM | HEART RATE: 89 BPM | OXYGEN SATURATION: 91 % | SYSTOLIC BLOOD PRESSURE: 109 MMHG

## 2024-01-01 DIAGNOSIS — N39.0 URINARY TRACT INFECTION, SITE NOT SPECIFIED: ICD-10-CM

## 2024-01-01 DIAGNOSIS — J96.01 ACUTE RESPIRATORY FAILURE WITH HYPOXIA: ICD-10-CM

## 2024-01-01 DIAGNOSIS — Z98.42 CATARACT EXTRACTION STATUS, LEFT EYE: Chronic | ICD-10-CM

## 2024-01-01 LAB
-  AMOXICILLIN/CLAVULANIC ACID: SIGNIFICANT CHANGE UP
-  AMOXICILLIN/CLAVULANIC ACID: SIGNIFICANT CHANGE UP
-  AMPICILLIN/SULBACTAM: SIGNIFICANT CHANGE UP
-  AMPICILLIN/SULBACTAM: SIGNIFICANT CHANGE UP
-  AMPICILLIN: SIGNIFICANT CHANGE UP
-  AMPICILLIN: SIGNIFICANT CHANGE UP
-  AZTREONAM: SIGNIFICANT CHANGE UP
-  AZTREONAM: SIGNIFICANT CHANGE UP
-  CEFAZOLIN: SIGNIFICANT CHANGE UP
-  CEFAZOLIN: SIGNIFICANT CHANGE UP
-  CEFEPIME: SIGNIFICANT CHANGE UP
-  CEFEPIME: SIGNIFICANT CHANGE UP
-  CEFOXITIN: SIGNIFICANT CHANGE UP
-  CEFOXITIN: SIGNIFICANT CHANGE UP
-  CEFTRIAXONE: SIGNIFICANT CHANGE UP
-  CEFTRIAXONE: SIGNIFICANT CHANGE UP
-  CEFUROXIME: SIGNIFICANT CHANGE UP
-  CIPROFLOXACIN: SIGNIFICANT CHANGE UP
-  CIPROFLOXACIN: SIGNIFICANT CHANGE UP
-  ERTAPENEM: SIGNIFICANT CHANGE UP
-  ERTAPENEM: SIGNIFICANT CHANGE UP
-  GENTAMICIN: SIGNIFICANT CHANGE UP
-  GENTAMICIN: SIGNIFICANT CHANGE UP
-  IMIPENEM: SIGNIFICANT CHANGE UP
-  IMIPENEM: SIGNIFICANT CHANGE UP
-  LEVOFLOXACIN: SIGNIFICANT CHANGE UP
-  LEVOFLOXACIN: SIGNIFICANT CHANGE UP
-  MEROPENEM: SIGNIFICANT CHANGE UP
-  MEROPENEM: SIGNIFICANT CHANGE UP
-  NITROFURANTOIN: SIGNIFICANT CHANGE UP
-  NITROFURANTOIN: SIGNIFICANT CHANGE UP
-  PIPERACILLIN/TAZOBACTAM: SIGNIFICANT CHANGE UP
-  PIPERACILLIN/TAZOBACTAM: SIGNIFICANT CHANGE UP
-  TOBRAMYCIN: SIGNIFICANT CHANGE UP
-  TOBRAMYCIN: SIGNIFICANT CHANGE UP
-  TRIMETHOPRIM/SULFAMETHOXAZOLE: SIGNIFICANT CHANGE UP
-  TRIMETHOPRIM/SULFAMETHOXAZOLE: SIGNIFICANT CHANGE UP
ADD ON TEST-SPECIMEN IN LAB: SIGNIFICANT CHANGE UP
ALBUMIN SERPL ELPH-MCNC: 1.9 G/DL — LOW (ref 3.3–5)
ALBUMIN SERPL ELPH-MCNC: 1.9 G/DL — LOW (ref 3.3–5)
ALBUMIN SERPL ELPH-MCNC: 2.7 G/DL — LOW (ref 3.3–5)
ALBUMIN SERPL ELPH-MCNC: 3.4 G/DL — SIGNIFICANT CHANGE UP (ref 3.3–5)
ALP SERPL-CCNC: 74 U/L — SIGNIFICANT CHANGE UP (ref 40–120)
ALP SERPL-CCNC: 78 U/L — SIGNIFICANT CHANGE UP (ref 40–120)
ALP SERPL-CCNC: 85 U/L — SIGNIFICANT CHANGE UP (ref 40–120)
ALP SERPL-CCNC: 90 U/L — SIGNIFICANT CHANGE UP (ref 40–120)
ALT FLD-CCNC: 17 U/L — SIGNIFICANT CHANGE UP (ref 12–78)
ALT FLD-CCNC: 25 U/L — SIGNIFICANT CHANGE UP (ref 12–78)
ALT FLD-CCNC: 26 U/L — SIGNIFICANT CHANGE UP (ref 12–78)
ALT FLD-CCNC: 26 U/L — SIGNIFICANT CHANGE UP (ref 12–78)
ANION GAP SERPL CALC-SCNC: 1 MMOL/L — LOW (ref 5–17)
ANION GAP SERPL CALC-SCNC: 3 MMOL/L — LOW (ref 5–17)
ANION GAP SERPL CALC-SCNC: 5 MMOL/L — SIGNIFICANT CHANGE UP (ref 5–17)
ANION GAP SERPL CALC-SCNC: 6 MMOL/L — SIGNIFICANT CHANGE UP (ref 5–17)
ANION GAP SERPL CALC-SCNC: 6 MMOL/L — SIGNIFICANT CHANGE UP (ref 5–17)
APPEARANCE UR: ABNORMAL
APTT BLD: 28.1 SEC — SIGNIFICANT CHANGE UP (ref 24.5–35.6)
AST SERPL-CCNC: 16 U/L — SIGNIFICANT CHANGE UP (ref 15–37)
AST SERPL-CCNC: 19 U/L — SIGNIFICANT CHANGE UP (ref 15–37)
AST SERPL-CCNC: 20 U/L — SIGNIFICANT CHANGE UP (ref 15–37)
AST SERPL-CCNC: 21 U/L — SIGNIFICANT CHANGE UP (ref 15–37)
BACTERIA # UR AUTO: ABNORMAL /HPF
BASOPHILS # BLD AUTO: 0.02 K/UL — SIGNIFICANT CHANGE UP (ref 0–0.2)
BASOPHILS # BLD AUTO: 0.03 K/UL — SIGNIFICANT CHANGE UP (ref 0–0.2)
BASOPHILS # BLD AUTO: 0.03 K/UL — SIGNIFICANT CHANGE UP (ref 0–0.2)
BASOPHILS NFR BLD AUTO: 0.1 % — SIGNIFICANT CHANGE UP (ref 0–2)
BASOPHILS NFR BLD AUTO: 0.3 % — SIGNIFICANT CHANGE UP (ref 0–2)
BASOPHILS NFR BLD AUTO: 0.3 % — SIGNIFICANT CHANGE UP (ref 0–2)
BILIRUB SERPL-MCNC: 0.4 MG/DL — SIGNIFICANT CHANGE UP (ref 0.2–1.2)
BILIRUB SERPL-MCNC: 0.6 MG/DL — SIGNIFICANT CHANGE UP (ref 0.2–1.2)
BILIRUB SERPL-MCNC: 1.4 MG/DL — HIGH (ref 0.2–1.2)
BILIRUB SERPL-MCNC: 1.6 MG/DL — HIGH (ref 0.2–1.2)
BILIRUB UR-MCNC: NEGATIVE — SIGNIFICANT CHANGE UP
BUN SERPL-MCNC: 16 MG/DL — SIGNIFICANT CHANGE UP (ref 7–23)
BUN SERPL-MCNC: 21 MG/DL — SIGNIFICANT CHANGE UP (ref 7–23)
BUN SERPL-MCNC: 22 MG/DL — SIGNIFICANT CHANGE UP (ref 7–23)
BUN SERPL-MCNC: 24 MG/DL — HIGH (ref 7–23)
BUN SERPL-MCNC: 25 MG/DL — HIGH (ref 7–23)
BUN SERPL-MCNC: 26 MG/DL — HIGH (ref 7–23)
BUN SERPL-MCNC: 27 MG/DL — HIGH (ref 7–23)
BUN SERPL-MCNC: 28 MG/DL — HIGH (ref 7–23)
BUN SERPL-MCNC: 29 MG/DL — HIGH (ref 7–23)
CALCIUM SERPL-MCNC: 8.3 MG/DL — LOW (ref 8.5–10.1)
CALCIUM SERPL-MCNC: 8.4 MG/DL — LOW (ref 8.5–10.1)
CALCIUM SERPL-MCNC: 8.5 MG/DL — SIGNIFICANT CHANGE UP (ref 8.5–10.1)
CALCIUM SERPL-MCNC: 8.5 MG/DL — SIGNIFICANT CHANGE UP (ref 8.5–10.1)
CALCIUM SERPL-MCNC: 8.6 MG/DL — SIGNIFICANT CHANGE UP (ref 8.5–10.1)
CALCIUM SERPL-MCNC: 8.8 MG/DL — SIGNIFICANT CHANGE UP (ref 8.5–10.1)
CALCIUM SERPL-MCNC: 8.9 MG/DL — SIGNIFICANT CHANGE UP (ref 8.5–10.1)
CALCIUM SERPL-MCNC: 8.9 MG/DL — SIGNIFICANT CHANGE UP (ref 8.5–10.1)
CALCIUM SERPL-MCNC: 9.1 MG/DL — SIGNIFICANT CHANGE UP (ref 8.5–10.1)
CALCIUM SERPL-MCNC: 9.1 MG/DL — SIGNIFICANT CHANGE UP (ref 8.5–10.1)
CALCIUM SERPL-MCNC: 9.9 MG/DL — SIGNIFICANT CHANGE UP (ref 8.5–10.1)
CHLORIDE SERPL-SCNC: 106 MMOL/L — SIGNIFICANT CHANGE UP (ref 96–108)
CHLORIDE SERPL-SCNC: 107 MMOL/L — SIGNIFICANT CHANGE UP (ref 96–108)
CHLORIDE SERPL-SCNC: 108 MMOL/L — SIGNIFICANT CHANGE UP (ref 96–108)
CHLORIDE SERPL-SCNC: 112 MMOL/L — HIGH (ref 96–108)
CHLORIDE SERPL-SCNC: 113 MMOL/L — HIGH (ref 96–108)
CHLORIDE SERPL-SCNC: 115 MMOL/L — HIGH (ref 96–108)
CHLORIDE SERPL-SCNC: 116 MMOL/L — HIGH (ref 96–108)
CO2 SERPL-SCNC: 23 MMOL/L — SIGNIFICANT CHANGE UP (ref 22–31)
CO2 SERPL-SCNC: 24 MMOL/L — SIGNIFICANT CHANGE UP (ref 22–31)
CO2 SERPL-SCNC: 25 MMOL/L — SIGNIFICANT CHANGE UP (ref 22–31)
CO2 SERPL-SCNC: 26 MMOL/L — SIGNIFICANT CHANGE UP (ref 22–31)
CO2 SERPL-SCNC: 27 MMOL/L — SIGNIFICANT CHANGE UP (ref 22–31)
CO2 SERPL-SCNC: 29 MMOL/L — SIGNIFICANT CHANGE UP (ref 22–31)
CO2 SERPL-SCNC: 30 MMOL/L — SIGNIFICANT CHANGE UP (ref 22–31)
CO2 SERPL-SCNC: 31 MMOL/L — SIGNIFICANT CHANGE UP (ref 22–31)
CO2 SERPL-SCNC: 31 MMOL/L — SIGNIFICANT CHANGE UP (ref 22–31)
COLOR SPEC: YELLOW — SIGNIFICANT CHANGE UP
CORTICOSTEROID BINDING GLOBULIN RESULT: 2.2 MG/DL — SIGNIFICANT CHANGE UP
CORTIS F/TOTAL MFR SERPL: 47 % — SIGNIFICANT CHANGE UP
CORTIS SERPL-MCNC: 28 UG/DL — HIGH
CORTISOL, FREE RESULT: 13 UG/DL — HIGH
CREAT SERPL-MCNC: 0.78 MG/DL — SIGNIFICANT CHANGE UP (ref 0.5–1.3)
CREAT SERPL-MCNC: 0.89 MG/DL — SIGNIFICANT CHANGE UP (ref 0.5–1.3)
CREAT SERPL-MCNC: 0.96 MG/DL — SIGNIFICANT CHANGE UP (ref 0.5–1.3)
CREAT SERPL-MCNC: 0.96 MG/DL — SIGNIFICANT CHANGE UP (ref 0.5–1.3)
CREAT SERPL-MCNC: 0.98 MG/DL — SIGNIFICANT CHANGE UP (ref 0.5–1.3)
CREAT SERPL-MCNC: 1.04 MG/DL — SIGNIFICANT CHANGE UP (ref 0.5–1.3)
CREAT SERPL-MCNC: 1.08 MG/DL — SIGNIFICANT CHANGE UP (ref 0.5–1.3)
CREAT SERPL-MCNC: 1.09 MG/DL — SIGNIFICANT CHANGE UP (ref 0.5–1.3)
CREAT SERPL-MCNC: 1.11 MG/DL — SIGNIFICANT CHANGE UP (ref 0.5–1.3)
CREAT SERPL-MCNC: 1.21 MG/DL — SIGNIFICANT CHANGE UP (ref 0.5–1.3)
CREAT SERPL-MCNC: 1.31 MG/DL — HIGH (ref 0.5–1.3)
CULTURE RESULTS: ABNORMAL
CULTURE RESULTS: SIGNIFICANT CHANGE UP
CULTURE RESULTS: SIGNIFICANT CHANGE UP
DIFF PNL FLD: ABNORMAL
EGFR: 41 ML/MIN/1.73M2 — LOW
EGFR: 45 ML/MIN/1.73M2 — LOW
EGFR: 50 ML/MIN/1.73M2 — LOW
EGFR: 51 ML/MIN/1.73M2 — LOW
EGFR: 51 ML/MIN/1.73M2 — LOW
EGFR: 54 ML/MIN/1.73M2 — LOW
EGFR: 58 ML/MIN/1.73M2 — LOW
EGFR: 59 ML/MIN/1.73M2 — LOW
EGFR: 59 ML/MIN/1.73M2 — LOW
EGFR: 65 ML/MIN/1.73M2 — SIGNIFICANT CHANGE UP
EGFR: 76 ML/MIN/1.73M2 — SIGNIFICANT CHANGE UP
EOSINOPHIL # BLD AUTO: 0 K/UL — SIGNIFICANT CHANGE UP (ref 0–0.5)
EOSINOPHIL # BLD AUTO: 0 K/UL — SIGNIFICANT CHANGE UP (ref 0–0.5)
EOSINOPHIL # BLD AUTO: 0.01 K/UL — SIGNIFICANT CHANGE UP (ref 0–0.5)
EOSINOPHIL NFR BLD AUTO: 0 % — SIGNIFICANT CHANGE UP (ref 0–6)
EOSINOPHIL NFR BLD AUTO: 0 % — SIGNIFICANT CHANGE UP (ref 0–6)
EOSINOPHIL NFR BLD AUTO: 0.1 % — SIGNIFICANT CHANGE UP (ref 0–6)
FLUAV AG NPH QL: SIGNIFICANT CHANGE UP
FLUBV AG NPH QL: SIGNIFICANT CHANGE UP
GLUCOSE SERPL-MCNC: 122 MG/DL — HIGH (ref 70–99)
GLUCOSE SERPL-MCNC: 124 MG/DL — HIGH (ref 70–99)
GLUCOSE SERPL-MCNC: 134 MG/DL — HIGH (ref 70–99)
GLUCOSE SERPL-MCNC: 137 MG/DL — HIGH (ref 70–99)
GLUCOSE SERPL-MCNC: 147 MG/DL — HIGH (ref 70–99)
GLUCOSE SERPL-MCNC: 160 MG/DL — HIGH (ref 70–99)
GLUCOSE SERPL-MCNC: 161 MG/DL — HIGH (ref 70–99)
GLUCOSE SERPL-MCNC: 165 MG/DL — HIGH (ref 70–99)
GLUCOSE SERPL-MCNC: 172 MG/DL — HIGH (ref 70–99)
GLUCOSE SERPL-MCNC: 200 MG/DL — HIGH (ref 70–99)
GLUCOSE SERPL-MCNC: 247 MG/DL — HIGH (ref 70–99)
GLUCOSE UR QL: NEGATIVE MG/DL — SIGNIFICANT CHANGE UP
HCT VFR BLD CALC: 27.1 % — LOW (ref 34.5–45)
HCT VFR BLD CALC: 27.1 % — LOW (ref 34.5–45)
HCT VFR BLD CALC: 30.1 % — LOW (ref 34.5–45)
HCT VFR BLD CALC: 30.2 % — LOW (ref 34.5–45)
HCT VFR BLD CALC: 30.7 % — LOW (ref 34.5–45)
HCT VFR BLD CALC: 31.3 % — LOW (ref 34.5–45)
HCT VFR BLD CALC: 33 % — LOW (ref 34.5–45)
HCT VFR BLD CALC: 33.2 % — LOW (ref 34.5–45)
HCT VFR BLD CALC: 33.4 % — LOW (ref 34.5–45)
HCT VFR BLD CALC: 33.9 % — LOW (ref 34.5–45)
HCT VFR BLD CALC: 35.2 % — SIGNIFICANT CHANGE UP (ref 34.5–45)
HGB BLD-MCNC: 10.1 G/DL — LOW (ref 11.5–15.5)
HGB BLD-MCNC: 10.3 G/DL — LOW (ref 11.5–15.5)
HGB BLD-MCNC: 10.5 G/DL — LOW (ref 11.5–15.5)
HGB BLD-MCNC: 10.5 G/DL — LOW (ref 11.5–15.5)
HGB BLD-MCNC: 10.6 G/DL — LOW (ref 11.5–15.5)
HGB BLD-MCNC: 11.3 G/DL — LOW (ref 11.5–15.5)
HGB BLD-MCNC: 8.7 G/DL — LOW (ref 11.5–15.5)
HGB BLD-MCNC: 8.9 G/DL — LOW (ref 11.5–15.5)
HGB BLD-MCNC: 9.4 G/DL — LOW (ref 11.5–15.5)
HGB BLD-MCNC: 9.7 G/DL — LOW (ref 11.5–15.5)
HGB BLD-MCNC: 9.9 G/DL — LOW (ref 11.5–15.5)
IMM GRANULOCYTES NFR BLD AUTO: 0.4 % — SIGNIFICANT CHANGE UP (ref 0–0.9)
IMM GRANULOCYTES NFR BLD AUTO: 0.6 % — SIGNIFICANT CHANGE UP (ref 0–0.9)
IMM GRANULOCYTES NFR BLD AUTO: 1.3 % — HIGH (ref 0–0.9)
INR BLD: 1.06 RATIO — SIGNIFICANT CHANGE UP (ref 0.85–1.16)
KETONES UR-MCNC: ABNORMAL MG/DL
LACTATE SERPL-SCNC: 2.4 MMOL/L — HIGH (ref 0.7–2)
LACTATE SERPL-SCNC: 2.7 MMOL/L — HIGH (ref 0.7–2)
LACTATE SERPL-SCNC: 3 MMOL/L — HIGH (ref 0.7–2)
LEUKOCYTE ESTERASE UR-ACNC: ABNORMAL
LYMPHOCYTES # BLD AUTO: 0.62 K/UL — LOW (ref 1–3.3)
LYMPHOCYTES # BLD AUTO: 0.69 K/UL — LOW (ref 1–3.3)
LYMPHOCYTES # BLD AUTO: 0.8 K/UL — LOW (ref 1–3.3)
LYMPHOCYTES # BLD AUTO: 4.2 % — LOW (ref 13–44)
LYMPHOCYTES # BLD AUTO: 7.1 % — LOW (ref 13–44)
LYMPHOCYTES # BLD AUTO: 8 % — LOW (ref 13–44)
MAGNESIUM SERPL-MCNC: 1.6 MG/DL — SIGNIFICANT CHANGE UP (ref 1.6–2.6)
MAGNESIUM SERPL-MCNC: 1.8 MG/DL — SIGNIFICANT CHANGE UP (ref 1.6–2.6)
MAGNESIUM SERPL-MCNC: 2 MG/DL — SIGNIFICANT CHANGE UP (ref 1.6–2.6)
MAGNESIUM SERPL-MCNC: 2.1 MG/DL — SIGNIFICANT CHANGE UP (ref 1.6–2.6)
MAGNESIUM SERPL-MCNC: 2.2 MG/DL — SIGNIFICANT CHANGE UP (ref 1.6–2.6)
MAGNESIUM SERPL-MCNC: 2.3 MG/DL — SIGNIFICANT CHANGE UP (ref 1.6–2.6)
MAGNESIUM SERPL-MCNC: 2.4 MG/DL — SIGNIFICANT CHANGE UP (ref 1.6–2.6)
MAGNESIUM SERPL-MCNC: 2.5 MG/DL — SIGNIFICANT CHANGE UP (ref 1.6–2.6)
MAGNESIUM SERPL-MCNC: 2.5 MG/DL — SIGNIFICANT CHANGE UP (ref 1.6–2.6)
MCHC RBC-ENTMCNC: 29.7 PG — SIGNIFICANT CHANGE UP (ref 27–34)
MCHC RBC-ENTMCNC: 29.9 PG — SIGNIFICANT CHANGE UP (ref 27–34)
MCHC RBC-ENTMCNC: 29.9 PG — SIGNIFICANT CHANGE UP (ref 27–34)
MCHC RBC-ENTMCNC: 30 PG — SIGNIFICANT CHANGE UP (ref 27–34)
MCHC RBC-ENTMCNC: 30.1 PG — SIGNIFICANT CHANGE UP (ref 27–34)
MCHC RBC-ENTMCNC: 30.2 PG — SIGNIFICANT CHANGE UP (ref 27–34)
MCHC RBC-ENTMCNC: 30.3 PG — SIGNIFICANT CHANGE UP (ref 27–34)
MCHC RBC-ENTMCNC: 30.4 PG — SIGNIFICANT CHANGE UP (ref 27–34)
MCHC RBC-ENTMCNC: 30.5 PG — SIGNIFICANT CHANGE UP (ref 27–34)
MCHC RBC-ENTMCNC: 30.7 PG — SIGNIFICANT CHANGE UP (ref 27–34)
MCHC RBC-ENTMCNC: 30.9 PG — SIGNIFICANT CHANGE UP (ref 27–34)
MCHC RBC-ENTMCNC: 31 G/DL — LOW (ref 32–36)
MCHC RBC-ENTMCNC: 31.2 G/DL — LOW (ref 32–36)
MCHC RBC-ENTMCNC: 31.3 G/DL — LOW (ref 32–36)
MCHC RBC-ENTMCNC: 31.4 G/DL — LOW (ref 32–36)
MCHC RBC-ENTMCNC: 31.6 G/DL — LOW (ref 32–36)
MCHC RBC-ENTMCNC: 31.8 G/DL — LOW (ref 32–36)
MCHC RBC-ENTMCNC: 32.1 G/DL — SIGNIFICANT CHANGE UP (ref 32–36)
MCHC RBC-ENTMCNC: 32.1 G/DL — SIGNIFICANT CHANGE UP (ref 32–36)
MCHC RBC-ENTMCNC: 32.3 G/DL — SIGNIFICANT CHANGE UP (ref 32–36)
MCHC RBC-ENTMCNC: 32.8 G/DL — SIGNIFICANT CHANGE UP (ref 32–36)
MCHC RBC-ENTMCNC: 32.8 G/DL — SIGNIFICANT CHANGE UP (ref 32–36)
MCV RBC AUTO: 92.8 FL — SIGNIFICANT CHANGE UP (ref 80–100)
MCV RBC AUTO: 93.1 FL — SIGNIFICANT CHANGE UP (ref 80–100)
MCV RBC AUTO: 93.8 FL — SIGNIFICANT CHANGE UP (ref 80–100)
MCV RBC AUTO: 94.3 FL — SIGNIFICANT CHANGE UP (ref 80–100)
MCV RBC AUTO: 94.8 FL — SIGNIFICANT CHANGE UP (ref 80–100)
MCV RBC AUTO: 95 FL — SIGNIFICANT CHANGE UP (ref 80–100)
MCV RBC AUTO: 95.5 FL — SIGNIFICANT CHANGE UP (ref 80–100)
MCV RBC AUTO: 95.7 FL — SIGNIFICANT CHANGE UP (ref 80–100)
MCV RBC AUTO: 96.2 FL — SIGNIFICANT CHANGE UP (ref 80–100)
MCV RBC AUTO: 96.3 FL — SIGNIFICANT CHANGE UP (ref 80–100)
MCV RBC AUTO: 96.5 FL — SIGNIFICANT CHANGE UP (ref 80–100)
METHOD TYPE: SIGNIFICANT CHANGE UP
METHOD TYPE: SIGNIFICANT CHANGE UP
MONOCYTES # BLD AUTO: 0.38 K/UL — SIGNIFICANT CHANGE UP (ref 0–0.9)
MONOCYTES # BLD AUTO: 0.74 K/UL — SIGNIFICANT CHANGE UP (ref 0–0.9)
MONOCYTES # BLD AUTO: 0.82 K/UL — SIGNIFICANT CHANGE UP (ref 0–0.9)
MONOCYTES NFR BLD AUTO: 2.6 % — SIGNIFICANT CHANGE UP (ref 2–14)
MONOCYTES NFR BLD AUTO: 7.4 % — SIGNIFICANT CHANGE UP (ref 2–14)
MONOCYTES NFR BLD AUTO: 8.4 % — SIGNIFICANT CHANGE UP (ref 2–14)
MRSA PCR RESULT.: SIGNIFICANT CHANGE UP
NEUTROPHILS # BLD AUTO: 13.48 K/UL — HIGH (ref 1.8–7.4)
NEUTROPHILS # BLD AUTO: 8.19 K/UL — HIGH (ref 1.8–7.4)
NEUTROPHILS # BLD AUTO: 8.3 K/UL — HIGH (ref 1.8–7.4)
NEUTROPHILS NFR BLD AUTO: 83.6 % — HIGH (ref 43–77)
NEUTROPHILS NFR BLD AUTO: 83.8 % — HIGH (ref 43–77)
NEUTROPHILS NFR BLD AUTO: 91.8 % — HIGH (ref 43–77)
NITRITE UR-MCNC: POSITIVE
NT-PROBNP SERPL-SCNC: 9429 PG/ML — HIGH (ref 0–450)
ORGANISM # SPEC MICROSCOPIC CNT: ABNORMAL
ORGANISM # SPEC MICROSCOPIC CNT: ABNORMAL
ORGANISM # SPEC MICROSCOPIC CNT: SIGNIFICANT CHANGE UP
PH UR: 5.5 — SIGNIFICANT CHANGE UP (ref 5–8)
PHOSPHATE SERPL-MCNC: 1.7 MG/DL — LOW (ref 2.5–4.5)
PHOSPHATE SERPL-MCNC: 2.1 MG/DL — LOW (ref 2.5–4.5)
PHOSPHATE SERPL-MCNC: 2.3 MG/DL — LOW (ref 2.5–4.5)
PHOSPHATE SERPL-MCNC: 2.4 MG/DL — LOW (ref 2.5–4.5)
PHOSPHATE SERPL-MCNC: 3 MG/DL — SIGNIFICANT CHANGE UP (ref 2.5–4.5)
PHOSPHATE SERPL-MCNC: 3.2 MG/DL — SIGNIFICANT CHANGE UP (ref 2.5–4.5)
PHOSPHATE SERPL-MCNC: 3.6 MG/DL — SIGNIFICANT CHANGE UP (ref 2.5–4.5)
PHOSPHATE SERPL-MCNC: 3.7 MG/DL — SIGNIFICANT CHANGE UP (ref 2.5–4.5)
PHOSPHATE SERPL-MCNC: 3.8 MG/DL — SIGNIFICANT CHANGE UP (ref 2.5–4.5)
PLATELET # BLD AUTO: 163 K/UL — SIGNIFICANT CHANGE UP (ref 150–400)
PLATELET # BLD AUTO: 178 K/UL — SIGNIFICANT CHANGE UP (ref 150–400)
PLATELET # BLD AUTO: 205 K/UL — SIGNIFICANT CHANGE UP (ref 150–400)
PLATELET # BLD AUTO: 205 K/UL — SIGNIFICANT CHANGE UP (ref 150–400)
PLATELET # BLD AUTO: 235 K/UL — SIGNIFICANT CHANGE UP (ref 150–400)
PLATELET # BLD AUTO: 257 K/UL — SIGNIFICANT CHANGE UP (ref 150–400)
PLATELET # BLD AUTO: 259 K/UL — SIGNIFICANT CHANGE UP (ref 150–400)
PLATELET # BLD AUTO: 353 K/UL — SIGNIFICANT CHANGE UP (ref 150–400)
PLATELET # BLD AUTO: 355 K/UL — SIGNIFICANT CHANGE UP (ref 150–400)
PLATELET # BLD AUTO: 371 K/UL — SIGNIFICANT CHANGE UP (ref 150–400)
PLATELET # BLD AUTO: 437 K/UL — HIGH (ref 150–400)
POTASSIUM SERPL-MCNC: 2.8 MMOL/L — CRITICAL LOW (ref 3.5–5.3)
POTASSIUM SERPL-MCNC: 3 MMOL/L — LOW (ref 3.5–5.3)
POTASSIUM SERPL-MCNC: 3.2 MMOL/L — LOW (ref 3.5–5.3)
POTASSIUM SERPL-MCNC: 3.7 MMOL/L — SIGNIFICANT CHANGE UP (ref 3.5–5.3)
POTASSIUM SERPL-MCNC: 3.8 MMOL/L — SIGNIFICANT CHANGE UP (ref 3.5–5.3)
POTASSIUM SERPL-MCNC: 3.8 MMOL/L — SIGNIFICANT CHANGE UP (ref 3.5–5.3)
POTASSIUM SERPL-MCNC: 3.9 MMOL/L — SIGNIFICANT CHANGE UP (ref 3.5–5.3)
POTASSIUM SERPL-MCNC: 4.4 MMOL/L — SIGNIFICANT CHANGE UP (ref 3.5–5.3)
POTASSIUM SERPL-MCNC: 4.9 MMOL/L — SIGNIFICANT CHANGE UP (ref 3.5–5.3)
POTASSIUM SERPL-SCNC: 2.8 MMOL/L — CRITICAL LOW (ref 3.5–5.3)
POTASSIUM SERPL-SCNC: 3 MMOL/L — LOW (ref 3.5–5.3)
POTASSIUM SERPL-SCNC: 3.2 MMOL/L — LOW (ref 3.5–5.3)
POTASSIUM SERPL-SCNC: 3.7 MMOL/L — SIGNIFICANT CHANGE UP (ref 3.5–5.3)
POTASSIUM SERPL-SCNC: 3.8 MMOL/L — SIGNIFICANT CHANGE UP (ref 3.5–5.3)
POTASSIUM SERPL-SCNC: 3.8 MMOL/L — SIGNIFICANT CHANGE UP (ref 3.5–5.3)
POTASSIUM SERPL-SCNC: 3.9 MMOL/L — SIGNIFICANT CHANGE UP (ref 3.5–5.3)
POTASSIUM SERPL-SCNC: 4.4 MMOL/L — SIGNIFICANT CHANGE UP (ref 3.5–5.3)
POTASSIUM SERPL-SCNC: 4.9 MMOL/L — SIGNIFICANT CHANGE UP (ref 3.5–5.3)
PROT SERPL-MCNC: 5.8 GM/DL — LOW (ref 6–8.3)
PROT SERPL-MCNC: 6.2 GM/DL — SIGNIFICANT CHANGE UP (ref 6–8.3)
PROT SERPL-MCNC: 6.5 GM/DL — SIGNIFICANT CHANGE UP (ref 6–8.3)
PROT SERPL-MCNC: 7.8 GM/DL — SIGNIFICANT CHANGE UP (ref 6–8.3)
PROT UR-MCNC: 100 MG/DL
PROTHROM AB SERPL-ACNC: 12.5 SEC — SIGNIFICANT CHANGE UP (ref 9.9–13.4)
RBC # BLD: 2.91 M/UL — LOW (ref 3.8–5.2)
RBC # BLD: 2.92 M/UL — LOW (ref 3.8–5.2)
RBC # BLD: 3.12 M/UL — LOW (ref 3.8–5.2)
RBC # BLD: 3.22 M/UL — LOW (ref 3.8–5.2)
RBC # BLD: 3.23 M/UL — LOW (ref 3.8–5.2)
RBC # BLD: 3.32 M/UL — LOW (ref 3.8–5.2)
RBC # BLD: 3.47 M/UL — LOW (ref 3.8–5.2)
RBC # BLD: 3.47 M/UL — LOW (ref 3.8–5.2)
RBC # BLD: 3.48 M/UL — LOW (ref 3.8–5.2)
RBC # BLD: 3.55 M/UL — LOW (ref 3.8–5.2)
RBC # BLD: 3.66 M/UL — LOW (ref 3.8–5.2)
RBC # FLD: 14.5 % — SIGNIFICANT CHANGE UP (ref 10.3–14.5)
RBC # FLD: 14.6 % — HIGH (ref 10.3–14.5)
RBC # FLD: 14.7 % — HIGH (ref 10.3–14.5)
RBC # FLD: 14.8 % — HIGH (ref 10.3–14.5)
RBC CASTS # UR COMP ASSIST: 2 /HPF — SIGNIFICANT CHANGE UP (ref 0–4)
RSV RNA NPH QL NAA+NON-PROBE: SIGNIFICANT CHANGE UP
S AUREUS DNA NOSE QL NAA+PROBE: DETECTED
SARS-COV-2 RNA SPEC QL NAA+PROBE: SIGNIFICANT CHANGE UP
SODIUM SERPL-SCNC: 136 MMOL/L — SIGNIFICANT CHANGE UP (ref 135–145)
SODIUM SERPL-SCNC: 139 MMOL/L — SIGNIFICANT CHANGE UP (ref 135–145)
SODIUM SERPL-SCNC: 140 MMOL/L — SIGNIFICANT CHANGE UP (ref 135–145)
SODIUM SERPL-SCNC: 141 MMOL/L — SIGNIFICANT CHANGE UP (ref 135–145)
SODIUM SERPL-SCNC: 143 MMOL/L — SIGNIFICANT CHANGE UP (ref 135–145)
SODIUM SERPL-SCNC: 145 MMOL/L — SIGNIFICANT CHANGE UP (ref 135–145)
SODIUM SERPL-SCNC: 145 MMOL/L — SIGNIFICANT CHANGE UP (ref 135–145)
SODIUM SERPL-SCNC: 147 MMOL/L — HIGH (ref 135–145)
SODIUM SERPL-SCNC: 148 MMOL/L — HIGH (ref 135–145)
SP GR SPEC: 1.02 — SIGNIFICANT CHANGE UP (ref 1–1.03)
SPECIMEN SOURCE: SIGNIFICANT CHANGE UP
SQUAMOUS # UR AUTO: 1 /HPF — SIGNIFICANT CHANGE UP (ref 0–5)
T4 AB SER-ACNC: 3.7 UG/DL — LOW (ref 4.6–12)
TSH SERPL-MCNC: 15.2 UU/ML — HIGH (ref 0.34–4.82)
UROBILINOGEN FLD QL: 1 MG/DL — SIGNIFICANT CHANGE UP (ref 0.2–1)
WBC # BLD: 10.66 K/UL — HIGH (ref 3.8–10.5)
WBC # BLD: 10.76 K/UL — HIGH (ref 3.8–10.5)
WBC # BLD: 12.39 K/UL — HIGH (ref 3.8–10.5)
WBC # BLD: 13.39 K/UL — HIGH (ref 3.8–10.5)
WBC # BLD: 14.29 K/UL — HIGH (ref 3.8–10.5)
WBC # BLD: 14.69 K/UL — HIGH (ref 3.8–10.5)
WBC # BLD: 15.47 K/UL — HIGH (ref 3.8–10.5)
WBC # BLD: 15.9 K/UL — HIGH (ref 3.8–10.5)
WBC # BLD: 20.34 K/UL — HIGH (ref 3.8–10.5)
WBC # BLD: 9.77 K/UL — SIGNIFICANT CHANGE UP (ref 3.8–10.5)
WBC # BLD: 9.94 K/UL — SIGNIFICANT CHANGE UP (ref 3.8–10.5)
WBC # FLD AUTO: 10.66 K/UL — HIGH (ref 3.8–10.5)
WBC # FLD AUTO: 10.76 K/UL — HIGH (ref 3.8–10.5)
WBC # FLD AUTO: 12.39 K/UL — HIGH (ref 3.8–10.5)
WBC # FLD AUTO: 13.39 K/UL — HIGH (ref 3.8–10.5)
WBC # FLD AUTO: 14.29 K/UL — HIGH (ref 3.8–10.5)
WBC # FLD AUTO: 14.69 K/UL — HIGH (ref 3.8–10.5)
WBC # FLD AUTO: 15.47 K/UL — HIGH (ref 3.8–10.5)
WBC # FLD AUTO: 15.9 K/UL — HIGH (ref 3.8–10.5)
WBC # FLD AUTO: 20.34 K/UL — HIGH (ref 3.8–10.5)
WBC # FLD AUTO: 9.77 K/UL — SIGNIFICANT CHANGE UP (ref 3.8–10.5)
WBC # FLD AUTO: 9.94 K/UL — SIGNIFICANT CHANGE UP (ref 3.8–10.5)
WBC UR QL: 3 /HPF — SIGNIFICANT CHANGE UP (ref 0–5)

## 2024-01-01 PROCEDURE — 84449 ASSAY OF TRANSCORTIN: CPT

## 2024-01-01 PROCEDURE — 99291 CRITICAL CARE FIRST HOUR: CPT

## 2024-01-01 PROCEDURE — 85025 COMPLETE CBC W/AUTO DIFF WBC: CPT

## 2024-01-01 PROCEDURE — 83605 ASSAY OF LACTIC ACID: CPT

## 2024-01-01 PROCEDURE — 92526 ORAL FUNCTION THERAPY: CPT | Mod: GN

## 2024-01-01 PROCEDURE — 84436 ASSAY OF TOTAL THYROXINE: CPT

## 2024-01-01 PROCEDURE — 85027 COMPLETE CBC AUTOMATED: CPT

## 2024-01-01 PROCEDURE — 93010 ELECTROCARDIOGRAM REPORT: CPT

## 2024-01-01 PROCEDURE — 71045 X-RAY EXAM CHEST 1 VIEW: CPT

## 2024-01-01 PROCEDURE — 80053 COMPREHEN METABOLIC PANEL: CPT

## 2024-01-01 PROCEDURE — 71045 X-RAY EXAM CHEST 1 VIEW: CPT | Mod: 26

## 2024-01-01 PROCEDURE — 99223 1ST HOSP IP/OBS HIGH 75: CPT

## 2024-01-01 PROCEDURE — 99497 ADVNCD CARE PLAN 30 MIN: CPT | Mod: 25

## 2024-01-01 PROCEDURE — 83735 ASSAY OF MAGNESIUM: CPT

## 2024-01-01 PROCEDURE — 99223 1ST HOSP IP/OBS HIGH 75: CPT | Mod: AI

## 2024-01-01 PROCEDURE — 80048 BASIC METABOLIC PNL TOTAL CA: CPT

## 2024-01-01 PROCEDURE — 87641 MR-STAPH DNA AMP PROBE: CPT

## 2024-01-01 PROCEDURE — 83880 ASSAY OF NATRIURETIC PEPTIDE: CPT

## 2024-01-01 PROCEDURE — 99233 SBSQ HOSP IP/OBS HIGH 50: CPT

## 2024-01-01 PROCEDURE — G0508: CPT

## 2024-01-01 PROCEDURE — 99232 SBSQ HOSP IP/OBS MODERATE 35: CPT

## 2024-01-01 PROCEDURE — 92610 EVALUATE SWALLOWING FUNCTION: CPT | Mod: GN

## 2024-01-01 PROCEDURE — 82533 TOTAL CORTISOL: CPT

## 2024-01-01 PROCEDURE — 36415 COLL VENOUS BLD VENIPUNCTURE: CPT

## 2024-01-01 PROCEDURE — 84443 ASSAY THYROID STIM HORMONE: CPT

## 2024-01-01 PROCEDURE — 87640 STAPH A DNA AMP PROBE: CPT

## 2024-01-01 PROCEDURE — 97530 THERAPEUTIC ACTIVITIES: CPT | Mod: GP

## 2024-01-01 PROCEDURE — 97162 PT EVAL MOD COMPLEX 30 MIN: CPT | Mod: GP

## 2024-01-01 PROCEDURE — 99285 EMERGENCY DEPT VISIT HI MDM: CPT | Mod: FS

## 2024-01-01 PROCEDURE — 84100 ASSAY OF PHOSPHORUS: CPT

## 2024-01-01 RX ORDER — ACETAMINOPHEN 80 MG/.8ML
675 SOLUTION/ DROPS ORAL ONCE
Refills: 0 | Status: COMPLETED | OUTPATIENT
Start: 2024-01-01 | End: 2024-01-01

## 2024-01-01 RX ORDER — PANTOPRAZOLE 40 MG/1
40 TABLET, DELAYED RELEASE ORAL
Refills: 0 | Status: DISCONTINUED | OUTPATIENT
Start: 2024-01-01 | End: 2024-01-01

## 2024-01-01 RX ORDER — ACETAMINOPHEN 80 MG/.8ML
650 SOLUTION/ DROPS ORAL EVERY 6 HOURS
Refills: 0 | Status: DISCONTINUED | OUTPATIENT
Start: 2024-01-01 | End: 2024-01-01

## 2024-01-01 RX ORDER — LEVOTHYROXINE SODIUM 175 UG/1
75 TABLET ORAL DAILY
Refills: 0 | Status: DISCONTINUED | OUTPATIENT
Start: 2024-01-01 | End: 2024-01-01

## 2024-01-01 RX ORDER — POTASSIUM PHOSPHATE, MONOBASIC AND POTASSIUM PHOSPHATE, DIBASIC 224; 236 MG/ML; MG/ML
15 INJECTION, SOLUTION INTRAVENOUS ONCE
Refills: 0 | Status: COMPLETED | OUTPATIENT
Start: 2024-01-01 | End: 2024-01-01

## 2024-01-01 RX ORDER — SERTRALINE HYDROCHLORIDE 25 MG/1
1 TABLET ORAL
Refills: 0 | DISCHARGE

## 2024-01-01 RX ORDER — PIPERACILLIN AND TAZOBACTAM 3; .375 G/15ML; G/15ML
3.38 INJECTION, POWDER, LYOPHILIZED, FOR SOLUTION INTRAVENOUS EVERY 8 HOURS
Refills: 0 | Status: DISCONTINUED | OUTPATIENT
Start: 2024-01-01 | End: 2024-01-01

## 2024-01-01 RX ORDER — ACETAMINOPHEN 80 MG/.8ML
1000 SOLUTION/ DROPS ORAL ONCE
Refills: 0 | Status: DISCONTINUED | OUTPATIENT
Start: 2024-01-01 | End: 2024-01-01

## 2024-01-01 RX ORDER — PHENYLEPHRINE HCL IN 0.9% NACL 0.4MG/10ML
0.1 SYRINGE (ML) INTRAVENOUS
Qty: 40 | Refills: 0 | Status: DISCONTINUED | OUTPATIENT
Start: 2024-01-01 | End: 2024-01-01

## 2024-01-01 RX ORDER — ENOXAPARIN SODIUM 60 MG/.6ML
30 INJECTION INTRAVENOUS; SUBCUTANEOUS EVERY 24 HOURS
Refills: 0 | Status: DISCONTINUED | OUTPATIENT
Start: 2024-01-01 | End: 2024-01-01

## 2024-01-01 RX ORDER — SOD PHOS DI, MONO/K PHOS MONO 250 MG
2 TABLET ORAL
Refills: 0 | Status: COMPLETED | OUTPATIENT
Start: 2024-01-01 | End: 2024-01-01

## 2024-01-01 RX ORDER — LEVOTHYROXINE SODIUM 175 UG/1
75 TABLET ORAL AT BEDTIME
Refills: 0 | Status: DISCONTINUED | OUTPATIENT
Start: 2024-01-01 | End: 2024-01-01

## 2024-01-01 RX ORDER — HYDROCORTISONE 100 MG/60ML
100 ENEMA RECTAL ONCE
Refills: 0 | Status: COMPLETED | OUTPATIENT
Start: 2024-01-01 | End: 2024-01-01

## 2024-01-01 RX ORDER — PANTOPRAZOLE 40 MG/1
40 TABLET, DELAYED RELEASE ORAL DAILY
Refills: 0 | Status: DISCONTINUED | OUTPATIENT
Start: 2024-01-01 | End: 2024-01-01

## 2024-01-01 RX ORDER — LEVOTHYROXINE SODIUM 175 UG/1
1 TABLET ORAL
Refills: 0 | DISCHARGE

## 2024-01-01 RX ORDER — AZITHROMYCIN MONOHYDRATE 200 MG/5ML
500 POWDER, FOR SUSPENSION ORAL EVERY 24 HOURS
Refills: 0 | Status: DISCONTINUED | OUTPATIENT
Start: 2024-01-01 | End: 2024-01-01

## 2024-01-01 RX ORDER — SODIUM CHLORIDE 9 MG/ML
1000 INJECTION, SOLUTION INTRAMUSCULAR; INTRAVENOUS; SUBCUTANEOUS ONCE
Refills: 0 | Status: COMPLETED | OUTPATIENT
Start: 2024-01-01 | End: 2024-01-01

## 2024-01-01 RX ORDER — LEVOTHYROXINE SODIUM 175 UG/1
50 TABLET ORAL ONCE
Refills: 0 | Status: COMPLETED | OUTPATIENT
Start: 2024-01-01 | End: 2024-01-01

## 2024-01-01 RX ORDER — LORAZEPAM 1 MG/1
0.25 TABLET ORAL EVERY 4 HOURS
Refills: 0 | Status: DISCONTINUED | OUTPATIENT
Start: 2024-01-01 | End: 2024-01-01

## 2024-01-01 RX ORDER — SODIUM CHLORIDE 9 MG/ML
1000 INJECTION, SOLUTION INTRAVENOUS
Refills: 0 | Status: DISCONTINUED | OUTPATIENT
Start: 2024-01-01 | End: 2024-01-01

## 2024-01-01 RX ORDER — POTASSIUM CHLORIDE 600 MG/1
40 TABLET, FILM COATED, EXTENDED RELEASE ORAL EVERY 4 HOURS
Refills: 0 | Status: COMPLETED | OUTPATIENT
Start: 2024-01-01 | End: 2024-01-01

## 2024-01-01 RX ORDER — FUROSEMIDE 20 MG
20 TABLET ORAL ONCE
Refills: 0 | Status: COMPLETED | OUTPATIENT
Start: 2024-01-01 | End: 2024-01-01

## 2024-01-01 RX ORDER — MAGNESIUM SULFATE 500 MG/ML
2 INJECTION, SOLUTION INTRAMUSCULAR; INTRAVENOUS ONCE
Refills: 0 | Status: COMPLETED | OUTPATIENT
Start: 2024-01-01 | End: 2024-01-01

## 2024-01-01 RX ORDER — PIPERACILLIN AND TAZOBACTAM 3; .375 G/15ML; G/15ML
3.38 INJECTION, POWDER, LYOPHILIZED, FOR SOLUTION INTRAVENOUS ONCE
Refills: 0 | Status: COMPLETED | OUTPATIENT
Start: 2024-01-01 | End: 2024-01-01

## 2024-01-01 RX ORDER — MORPHINE SULFATE 15 MG
2 TABLET, EXTENDED RELEASE ORAL EVERY 4 HOURS
Refills: 0 | Status: DISCONTINUED | OUTPATIENT
Start: 2024-01-01 | End: 2024-01-01

## 2024-01-01 RX ORDER — POTASSIUM CHLORIDE 600 MG/1
40 TABLET, FILM COATED, EXTENDED RELEASE ORAL ONCE
Refills: 0 | Status: COMPLETED | OUTPATIENT
Start: 2024-01-01 | End: 2024-01-01

## 2024-01-01 RX ORDER — AZITHROMYCIN MONOHYDRATE 200 MG/5ML
500 POWDER, FOR SUSPENSION ORAL ONCE
Refills: 0 | Status: COMPLETED | OUTPATIENT
Start: 2024-01-01 | End: 2024-01-01

## 2024-01-01 RX ORDER — POTASSIUM PHOSPHATE, MONOBASIC AND POTASSIUM PHOSPHATE, DIBASIC 224; 236 MG/ML; MG/ML
30 INJECTION, SOLUTION INTRAVENOUS ONCE
Refills: 0 | Status: COMPLETED | OUTPATIENT
Start: 2024-01-01 | End: 2024-01-01

## 2024-01-01 RX ORDER — INFLUENZA A VIRUS A/WISCONSIN/588/2019 (H1N1) RECOMBINANT HEMAGGLUTININ ANTIGEN, INFLUENZA A VIRUS A/DARWIN/6/2021 (H3N2) RECOMBINANT HEMAGGLUTININ ANTIGEN, INFLUENZA B VIRUS B/AUSTRIA/1359417/2021 RECOMBINANT HEMAGGLUTININ ANTIGEN, AND INFLUENZA B VIRUS B/PHUKET/3073/2013 RECOMBINANT HEMAGGLUTININ ANTIGEN 45; 45; 45; 45 UG/.5ML; UG/.5ML; UG/.5ML; UG/.5ML
0.5 INJECTION INTRAMUSCULAR ONCE
Refills: 0 | Status: COMPLETED | OUTPATIENT
Start: 2024-01-01 | End: 2024-01-01

## 2024-01-01 RX ORDER — POTASSIUM CHLORIDE 600 MG/1
10 TABLET, FILM COATED, EXTENDED RELEASE ORAL
Refills: 0 | Status: COMPLETED | OUTPATIENT
Start: 2024-01-01 | End: 2024-01-01

## 2024-01-01 RX ORDER — AZITHROMYCIN MONOHYDRATE 200 MG/5ML
500 POWDER, FOR SUSPENSION ORAL ONCE
Refills: 0 | Status: DISCONTINUED | OUTPATIENT
Start: 2024-01-01 | End: 2024-01-01

## 2024-01-01 RX ORDER — CHLORHEXIDINE GLUCONATE 1.2 MG/ML
1 RINSE ORAL
Refills: 0 | Status: DISCONTINUED | OUTPATIENT
Start: 2024-01-01 | End: 2024-01-01

## 2024-01-01 RX ORDER — CEFTRIAXONE SODIUM 1 G/1
1000 INJECTION, POWDER, FOR SOLUTION INTRAMUSCULAR; INTRAVENOUS EVERY 24 HOURS
Refills: 0 | Status: DISCONTINUED | OUTPATIENT
Start: 2024-01-01 | End: 2024-01-01

## 2024-01-01 RX ORDER — NOREPINEPHRINE BITARTRATE 1 MG/ML
0.05 INJECTION INTRAVENOUS
Qty: 8 | Refills: 0 | Status: DISCONTINUED | OUTPATIENT
Start: 2024-01-01 | End: 2024-01-01

## 2024-01-01 RX ORDER — CHLORHEXIDINE GLUCONATE 1.2 MG/ML
1 RINSE ORAL DAILY
Refills: 0 | Status: DISCONTINUED | OUTPATIENT
Start: 2024-01-01 | End: 2024-01-01

## 2024-01-01 RX ORDER — MUPIROCIN 2 %
1 OINTMENT (GRAM) TOPICAL
Refills: 0 | Status: COMPLETED | OUTPATIENT
Start: 2024-01-01 | End: 2024-01-01

## 2024-01-01 RX ORDER — MAGNESIUM SULFATE 500 MG/ML
1 INJECTION, SOLUTION INTRAMUSCULAR; INTRAVENOUS ONCE
Refills: 0 | Status: COMPLETED | OUTPATIENT
Start: 2024-01-01 | End: 2024-01-01

## 2024-01-01 RX ORDER — HYDROCORTISONE 100 MG/60ML
50 ENEMA RECTAL EVERY 8 HOURS
Refills: 0 | Status: DISCONTINUED | OUTPATIENT
Start: 2024-01-01 | End: 2024-01-01

## 2024-01-01 RX ORDER — SODIUM CHLORIDE 9 MG/ML
1500 INJECTION, SOLUTION INTRAMUSCULAR; INTRAVENOUS; SUBCUTANEOUS ONCE
Refills: 0 | Status: COMPLETED | OUTPATIENT
Start: 2024-01-01 | End: 2024-01-01

## 2024-01-01 RX ORDER — MIDODRINE HYDROCHLORIDE 5 MG/1
10 TABLET ORAL EVERY 8 HOURS
Refills: 0 | Status: DISCONTINUED | OUTPATIENT
Start: 2024-01-01 | End: 2024-01-01

## 2024-01-01 RX ADMIN — PANTOPRAZOLE 40 MILLIGRAM(S): 40 TABLET, DELAYED RELEASE ORAL at 13:26

## 2024-01-01 RX ADMIN — HYDROCORTISONE 50 MILLIGRAM(S): 100 ENEMA RECTAL at 05:13

## 2024-01-01 RX ADMIN — ACETAMINOPHEN 270 MILLIGRAM(S): 80 SOLUTION/ DROPS ORAL at 23:06

## 2024-01-01 RX ADMIN — HYDROCORTISONE 100 MILLIGRAM(S): 100 ENEMA RECTAL at 17:18

## 2024-01-01 RX ADMIN — ACETAMINOPHEN 650 MILLIGRAM(S): 80 SOLUTION/ DROPS ORAL at 23:20

## 2024-01-01 RX ADMIN — HYDROCORTISONE 50 MILLIGRAM(S): 100 ENEMA RECTAL at 21:09

## 2024-01-01 RX ADMIN — CHLORHEXIDINE GLUCONATE 1 APPLICATION(S): 1.2 RINSE ORAL at 13:56

## 2024-01-01 RX ADMIN — Medication 1 APPLICATION(S): at 21:42

## 2024-01-01 RX ADMIN — Medication 2 MILLIGRAM(S): at 10:15

## 2024-01-01 RX ADMIN — ENOXAPARIN SODIUM 30 MILLIGRAM(S): 60 INJECTION INTRAVENOUS; SUBCUTANEOUS at 09:44

## 2024-01-01 RX ADMIN — Medication 2 MILLIGRAM(S): at 03:05

## 2024-01-01 RX ADMIN — ENOXAPARIN SODIUM 30 MILLIGRAM(S): 60 INJECTION INTRAVENOUS; SUBCUTANEOUS at 10:36

## 2024-01-01 RX ADMIN — PANTOPRAZOLE 40 MILLIGRAM(S): 40 TABLET, DELAYED RELEASE ORAL at 06:59

## 2024-01-01 RX ADMIN — PIPERACILLIN AND TAZOBACTAM 25 GRAM(S): 3; .375 INJECTION, POWDER, LYOPHILIZED, FOR SOLUTION INTRAVENOUS at 13:23

## 2024-01-01 RX ADMIN — POTASSIUM PHOSPHATE, MONOBASIC AND POTASSIUM PHOSPHATE, DIBASIC 62.5 MILLIMOLE(S): 224; 236 INJECTION, SOLUTION INTRAVENOUS at 08:39

## 2024-01-01 RX ADMIN — Medication 2 MILLIGRAM(S): at 05:41

## 2024-01-01 RX ADMIN — Medication 2 MILLIGRAM(S): at 07:43

## 2024-01-01 RX ADMIN — Medication 2 PACKET(S): at 22:44

## 2024-01-01 RX ADMIN — Medication 1 APPLICATION(S): at 22:02

## 2024-01-01 RX ADMIN — Medication 1 APPLICATION(S): at 10:00

## 2024-01-01 RX ADMIN — CHLORHEXIDINE GLUCONATE 1 APPLICATION(S): 1.2 RINSE ORAL at 11:20

## 2024-01-01 RX ADMIN — PANTOPRAZOLE 40 MILLIGRAM(S): 40 TABLET, DELAYED RELEASE ORAL at 06:42

## 2024-01-01 RX ADMIN — Medication 2 MILLIGRAM(S): at 18:19

## 2024-01-01 RX ADMIN — PIPERACILLIN AND TAZOBACTAM 25 GRAM(S): 3; .375 INJECTION, POWDER, LYOPHILIZED, FOR SOLUTION INTRAVENOUS at 13:39

## 2024-01-01 RX ADMIN — MIDODRINE HYDROCHLORIDE 10 MILLIGRAM(S): 5 TABLET ORAL at 13:34

## 2024-01-01 RX ADMIN — CHLORHEXIDINE GLUCONATE 1 APPLICATION(S): 1.2 RINSE ORAL at 10:43

## 2024-01-01 RX ADMIN — POTASSIUM PHOSPHATE, MONOBASIC AND POTASSIUM PHOSPHATE, DIBASIC 83.33 MILLIMOLE(S): 224; 236 INJECTION, SOLUTION INTRAVENOUS at 06:59

## 2024-01-01 RX ADMIN — HYDROCORTISONE 50 MILLIGRAM(S): 100 ENEMA RECTAL at 13:26

## 2024-01-01 RX ADMIN — ENOXAPARIN SODIUM 30 MILLIGRAM(S): 60 INJECTION INTRAVENOUS; SUBCUTANEOUS at 09:12

## 2024-01-01 RX ADMIN — SODIUM CHLORIDE 50 MILLILITER(S): 9 INJECTION, SOLUTION INTRAVENOUS at 10:43

## 2024-01-01 RX ADMIN — LEVOTHYROXINE SODIUM 75 MICROGRAM(S): 175 TABLET ORAL at 05:28

## 2024-01-01 RX ADMIN — PIPERACILLIN AND TAZOBACTAM 25 GRAM(S): 3; .375 INJECTION, POWDER, LYOPHILIZED, FOR SOLUTION INTRAVENOUS at 01:24

## 2024-01-01 RX ADMIN — Medication 2 MILLIGRAM(S): at 06:15

## 2024-01-01 RX ADMIN — LEVOTHYROXINE SODIUM 75 MICROGRAM(S): 175 TABLET ORAL at 10:37

## 2024-01-01 RX ADMIN — Medication 2 MILLIGRAM(S): at 02:05

## 2024-01-01 RX ADMIN — NOREPINEPHRINE BITARTRATE 4.25 MICROGRAM(S)/KG/MIN: 1 INJECTION INTRAVENOUS at 17:18

## 2024-01-01 RX ADMIN — LORAZEPAM 0.25 MILLIGRAM(S): 1 TABLET ORAL at 13:11

## 2024-01-01 RX ADMIN — Medication 2 MILLIGRAM(S): at 18:17

## 2024-01-01 RX ADMIN — CHLORHEXIDINE GLUCONATE 1 APPLICATION(S): 1.2 RINSE ORAL at 10:00

## 2024-01-01 RX ADMIN — HYDROCORTISONE 50 MILLIGRAM(S): 100 ENEMA RECTAL at 14:33

## 2024-01-01 RX ADMIN — Medication 1.7 MICROGRAM(S)/KG/MIN: at 12:10

## 2024-01-01 RX ADMIN — ACETAMINOPHEN 270 MILLIGRAM(S): 80 SOLUTION/ DROPS ORAL at 04:18

## 2024-01-01 RX ADMIN — Medication 2 MILLIGRAM(S): at 05:11

## 2024-01-01 RX ADMIN — LEVOTHYROXINE SODIUM 50 MICROGRAM(S): 175 TABLET ORAL at 05:36

## 2024-01-01 RX ADMIN — LEVOTHYROXINE SODIUM 50 MICROGRAM(S): 175 TABLET ORAL at 18:24

## 2024-01-01 RX ADMIN — LEVOTHYROXINE SODIUM 75 MICROGRAM(S): 175 TABLET ORAL at 05:37

## 2024-01-01 RX ADMIN — Medication 2 MILLIGRAM(S): at 22:15

## 2024-01-01 RX ADMIN — CHLORHEXIDINE GLUCONATE 1 APPLICATION(S): 1.2 RINSE ORAL at 10:30

## 2024-01-01 RX ADMIN — POTASSIUM CHLORIDE 100 MILLIEQUIVALENT(S): 600 TABLET, FILM COATED, EXTENDED RELEASE ORAL at 12:31

## 2024-01-01 RX ADMIN — ACETAMINOPHEN 270 MILLIGRAM(S): 80 SOLUTION/ DROPS ORAL at 21:05

## 2024-01-01 RX ADMIN — LEVOTHYROXINE SODIUM 75 MICROGRAM(S): 175 TABLET ORAL at 06:07

## 2024-01-01 RX ADMIN — Medication 2 MILLIGRAM(S): at 15:08

## 2024-01-01 RX ADMIN — HYDROCORTISONE 50 MILLIGRAM(S): 100 ENEMA RECTAL at 22:10

## 2024-01-01 RX ADMIN — HYDROCORTISONE 50 MILLIGRAM(S): 100 ENEMA RECTAL at 13:23

## 2024-01-01 RX ADMIN — Medication 20 MILLIGRAM(S): at 09:59

## 2024-01-01 RX ADMIN — PANTOPRAZOLE 40 MILLIGRAM(S): 40 TABLET, DELAYED RELEASE ORAL at 09:51

## 2024-01-01 RX ADMIN — HYDROCORTISONE 50 MILLIGRAM(S): 100 ENEMA RECTAL at 13:53

## 2024-01-01 RX ADMIN — Medication 2 MILLIGRAM(S): at 19:53

## 2024-01-01 RX ADMIN — PIPERACILLIN AND TAZOBACTAM 25 GRAM(S): 3; .375 INJECTION, POWDER, LYOPHILIZED, FOR SOLUTION INTRAVENOUS at 17:50

## 2024-01-01 RX ADMIN — PIPERACILLIN AND TAZOBACTAM 25 GRAM(S): 3; .375 INJECTION, POWDER, LYOPHILIZED, FOR SOLUTION INTRAVENOUS at 22:10

## 2024-01-01 RX ADMIN — LEVOTHYROXINE SODIUM 75 MICROGRAM(S): 175 TABLET ORAL at 05:49

## 2024-01-01 RX ADMIN — Medication 2 MILLIGRAM(S): at 20:00

## 2024-01-01 RX ADMIN — ENOXAPARIN SODIUM 30 MILLIGRAM(S): 60 INJECTION INTRAVENOUS; SUBCUTANEOUS at 09:50

## 2024-01-01 RX ADMIN — Medication 2 MILLIGRAM(S): at 16:19

## 2024-01-01 RX ADMIN — HYDROCORTISONE 50 MILLIGRAM(S): 100 ENEMA RECTAL at 05:34

## 2024-01-01 RX ADMIN — Medication 2 MILLIGRAM(S): at 03:38

## 2024-01-01 RX ADMIN — Medication 2 MILLIGRAM(S): at 11:05

## 2024-01-01 RX ADMIN — Medication 2 MILLIGRAM(S): at 08:38

## 2024-01-01 RX ADMIN — SODIUM CHLORIDE 1000 MILLILITER(S): 9 INJECTION, SOLUTION INTRAMUSCULAR; INTRAVENOUS; SUBCUTANEOUS at 21:06

## 2024-01-01 RX ADMIN — HYDROCORTISONE 50 MILLIGRAM(S): 100 ENEMA RECTAL at 21:42

## 2024-01-01 RX ADMIN — ACETAMINOPHEN 650 MILLIGRAM(S): 80 SOLUTION/ DROPS ORAL at 18:30

## 2024-01-01 RX ADMIN — Medication 2 MILLIGRAM(S): at 05:49

## 2024-01-01 RX ADMIN — PIPERACILLIN AND TAZOBACTAM 25 GRAM(S): 3; .375 INJECTION, POWDER, LYOPHILIZED, FOR SOLUTION INTRAVENOUS at 21:10

## 2024-01-01 RX ADMIN — PANTOPRAZOLE 40 MILLIGRAM(S): 40 TABLET, DELAYED RELEASE ORAL at 07:37

## 2024-01-01 RX ADMIN — Medication 2 MILLIGRAM(S): at 14:07

## 2024-01-01 RX ADMIN — HYDROCORTISONE 50 MILLIGRAM(S): 100 ENEMA RECTAL at 22:02

## 2024-01-01 RX ADMIN — Medication 2 MILLIGRAM(S): at 21:09

## 2024-01-01 RX ADMIN — PIPERACILLIN AND TAZOBACTAM 25 GRAM(S): 3; .375 INJECTION, POWDER, LYOPHILIZED, FOR SOLUTION INTRAVENOUS at 05:54

## 2024-01-01 RX ADMIN — HYDROCORTISONE 50 MILLIGRAM(S): 100 ENEMA RECTAL at 13:40

## 2024-01-01 RX ADMIN — Medication 2 MILLIGRAM(S): at 02:47

## 2024-01-01 RX ADMIN — HYDROCORTISONE 50 MILLIGRAM(S): 100 ENEMA RECTAL at 14:12

## 2024-01-01 RX ADMIN — ACETAMINOPHEN 650 MILLIGRAM(S): 80 SOLUTION/ DROPS ORAL at 09:45

## 2024-01-01 RX ADMIN — ENOXAPARIN SODIUM 30 MILLIGRAM(S): 60 INJECTION INTRAVENOUS; SUBCUTANEOUS at 10:00

## 2024-01-01 RX ADMIN — Medication 1 APPLICATION(S): at 10:37

## 2024-01-01 RX ADMIN — PIPERACILLIN AND TAZOBACTAM 25 GRAM(S): 3; .375 INJECTION, POWDER, LYOPHILIZED, FOR SOLUTION INTRAVENOUS at 06:06

## 2024-01-01 RX ADMIN — PIPERACILLIN AND TAZOBACTAM 25 GRAM(S): 3; .375 INJECTION, POWDER, LYOPHILIZED, FOR SOLUTION INTRAVENOUS at 16:31

## 2024-01-01 RX ADMIN — Medication 2 MILLIGRAM(S): at 22:34

## 2024-01-01 RX ADMIN — Medication 2 MILLIGRAM(S): at 09:59

## 2024-01-01 RX ADMIN — Medication 1 APPLICATION(S): at 09:50

## 2024-01-01 RX ADMIN — ACETAMINOPHEN 650 MILLIGRAM(S): 80 SOLUTION/ DROPS ORAL at 17:50

## 2024-01-01 RX ADMIN — Medication 2 MILLIGRAM(S): at 21:25

## 2024-01-01 RX ADMIN — PIPERACILLIN AND TAZOBACTAM 25 GRAM(S): 3; .375 INJECTION, POWDER, LYOPHILIZED, FOR SOLUTION INTRAVENOUS at 21:57

## 2024-01-01 RX ADMIN — PIPERACILLIN AND TAZOBACTAM 25 GRAM(S): 3; .375 INJECTION, POWDER, LYOPHILIZED, FOR SOLUTION INTRAVENOUS at 00:12

## 2024-01-01 RX ADMIN — Medication 2 MILLIGRAM(S): at 13:26

## 2024-01-01 RX ADMIN — POTASSIUM CHLORIDE 100 MILLIEQUIVALENT(S): 600 TABLET, FILM COATED, EXTENDED RELEASE ORAL at 10:30

## 2024-01-01 RX ADMIN — LORAZEPAM 0.25 MILLIGRAM(S): 1 TABLET ORAL at 09:03

## 2024-01-01 RX ADMIN — Medication 1 APPLICATION(S): at 21:49

## 2024-01-01 RX ADMIN — ACETAMINOPHEN 270 MILLIGRAM(S): 80 SOLUTION/ DROPS ORAL at 00:27

## 2024-01-01 RX ADMIN — POTASSIUM CHLORIDE 40 MILLIEQUIVALENT(S): 600 TABLET, FILM COATED, EXTENDED RELEASE ORAL at 16:33

## 2024-01-01 RX ADMIN — LORAZEPAM 0.25 MILLIGRAM(S): 1 TABLET ORAL at 05:22

## 2024-01-01 RX ADMIN — Medication 2 MILLIGRAM(S): at 10:47

## 2024-01-01 RX ADMIN — Medication 2 MILLIGRAM(S): at 22:40

## 2024-01-01 RX ADMIN — MIDODRINE HYDROCHLORIDE 10 MILLIGRAM(S): 5 TABLET ORAL at 21:39

## 2024-01-01 RX ADMIN — Medication 2 MILLIGRAM(S): at 13:40

## 2024-01-01 RX ADMIN — CHLORHEXIDINE GLUCONATE 1 APPLICATION(S): 1.2 RINSE ORAL at 11:28

## 2024-01-01 RX ADMIN — ACETAMINOPHEN 650 MILLIGRAM(S): 80 SOLUTION/ DROPS ORAL at 16:00

## 2024-01-01 RX ADMIN — ACETAMINOPHEN 650 MILLIGRAM(S): 80 SOLUTION/ DROPS ORAL at 22:25

## 2024-01-01 RX ADMIN — Medication 2 MILLIGRAM(S): at 15:38

## 2024-01-01 RX ADMIN — NOREPINEPHRINE BITARTRATE 4.25 MICROGRAM(S)/KG/MIN: 1 INJECTION INTRAVENOUS at 21:18

## 2024-01-01 RX ADMIN — SODIUM CHLORIDE 1000 MILLILITER(S): 9 INJECTION, SOLUTION INTRAMUSCULAR; INTRAVENOUS; SUBCUTANEOUS at 19:41

## 2024-01-01 RX ADMIN — Medication 2 MILLIGRAM(S): at 01:50

## 2024-01-01 RX ADMIN — MIDODRINE HYDROCHLORIDE 10 MILLIGRAM(S): 5 TABLET ORAL at 05:13

## 2024-01-01 RX ADMIN — PIPERACILLIN AND TAZOBACTAM 25 GRAM(S): 3; .375 INJECTION, POWDER, LYOPHILIZED, FOR SOLUTION INTRAVENOUS at 21:43

## 2024-01-01 RX ADMIN — Medication 20 MILLIGRAM(S): at 13:40

## 2024-01-01 RX ADMIN — LORAZEPAM 0.25 MILLIGRAM(S): 1 TABLET ORAL at 18:42

## 2024-01-01 RX ADMIN — HYDROCORTISONE 50 MILLIGRAM(S): 100 ENEMA RECTAL at 22:44

## 2024-01-01 RX ADMIN — LEVOTHYROXINE SODIUM 75 MICROGRAM(S): 175 TABLET ORAL at 05:23

## 2024-01-01 RX ADMIN — SODIUM CHLORIDE 50 MILLILITER(S): 9 INJECTION, SOLUTION INTRAVENOUS at 05:12

## 2024-01-01 RX ADMIN — HYDROCORTISONE 50 MILLIGRAM(S): 100 ENEMA RECTAL at 05:23

## 2024-01-01 RX ADMIN — SODIUM CHLORIDE 50 MILLILITER(S): 9 INJECTION, SOLUTION INTRAVENOUS at 23:55

## 2024-01-01 RX ADMIN — HYDROCORTISONE 50 MILLIGRAM(S): 100 ENEMA RECTAL at 21:56

## 2024-01-01 RX ADMIN — Medication 2 MILLIGRAM(S): at 22:25

## 2024-01-01 RX ADMIN — PANTOPRAZOLE 40 MILLIGRAM(S): 40 TABLET, DELAYED RELEASE ORAL at 09:44

## 2024-01-01 RX ADMIN — Medication 2 PACKET(S): at 09:45

## 2024-01-01 RX ADMIN — PIPERACILLIN AND TAZOBACTAM 25 GRAM(S): 3; .375 INJECTION, POWDER, LYOPHILIZED, FOR SOLUTION INTRAVENOUS at 09:44

## 2024-01-01 RX ADMIN — Medication 2 MILLIGRAM(S): at 18:25

## 2024-01-01 RX ADMIN — HYDROCORTISONE 50 MILLIGRAM(S): 100 ENEMA RECTAL at 06:05

## 2024-01-01 RX ADMIN — Medication 2 MILLIGRAM(S): at 06:57

## 2024-01-01 RX ADMIN — MAGNESIUM SULFATE 100 GRAM(S): 500 INJECTION, SOLUTION INTRAMUSCULAR; INTRAVENOUS at 12:46

## 2024-01-01 RX ADMIN — HYDROCORTISONE 50 MILLIGRAM(S): 100 ENEMA RECTAL at 14:10

## 2024-01-01 RX ADMIN — CHLORHEXIDINE GLUCONATE 1 APPLICATION(S): 1.2 RINSE ORAL at 11:41

## 2024-01-01 RX ADMIN — PIPERACILLIN AND TAZOBACTAM 200 GRAM(S): 3; .375 INJECTION, POWDER, LYOPHILIZED, FOR SOLUTION INTRAVENOUS at 18:00

## 2024-01-01 RX ADMIN — ENOXAPARIN SODIUM 30 MILLIGRAM(S): 60 INJECTION INTRAVENOUS; SUBCUTANEOUS at 11:20

## 2024-01-01 RX ADMIN — PIPERACILLIN AND TAZOBACTAM 25 GRAM(S): 3; .375 INJECTION, POWDER, LYOPHILIZED, FOR SOLUTION INTRAVENOUS at 05:55

## 2024-01-01 RX ADMIN — Medication 2 MILLIGRAM(S): at 20:51

## 2024-01-01 RX ADMIN — Medication 2 PACKET(S): at 21:39

## 2024-01-01 RX ADMIN — SODIUM CHLORIDE 100 MILLILITER(S): 9 INJECTION, SOLUTION INTRAVENOUS at 01:24

## 2024-01-01 RX ADMIN — POTASSIUM PHOSPHATE, MONOBASIC AND POTASSIUM PHOSPHATE, DIBASIC 62.5 MILLIMOLE(S): 224; 236 INJECTION, SOLUTION INTRAVENOUS at 09:00

## 2024-01-01 RX ADMIN — MAGNESIUM SULFATE 25 GRAM(S): 500 INJECTION, SOLUTION INTRAMUSCULAR; INTRAVENOUS at 08:45

## 2024-01-01 RX ADMIN — ACETAMINOPHEN 650 MILLIGRAM(S): 80 SOLUTION/ DROPS ORAL at 21:42

## 2024-01-01 RX ADMIN — ENOXAPARIN SODIUM 30 MILLIGRAM(S): 60 INJECTION INTRAVENOUS; SUBCUTANEOUS at 08:47

## 2024-01-01 RX ADMIN — LORAZEPAM 0.25 MILLIGRAM(S): 1 TABLET ORAL at 12:52

## 2024-01-01 RX ADMIN — ENOXAPARIN SODIUM 30 MILLIGRAM(S): 60 INJECTION INTRAVENOUS; SUBCUTANEOUS at 09:59

## 2024-01-01 RX ADMIN — PIPERACILLIN AND TAZOBACTAM 25 GRAM(S): 3; .375 INJECTION, POWDER, LYOPHILIZED, FOR SOLUTION INTRAVENOUS at 22:07

## 2024-01-01 RX ADMIN — Medication 2 MILLIGRAM(S): at 05:35

## 2024-01-01 RX ADMIN — CHLORHEXIDINE GLUCONATE 1 APPLICATION(S): 1.2 RINSE ORAL at 10:37

## 2024-01-01 RX ADMIN — HYDROCORTISONE 50 MILLIGRAM(S): 100 ENEMA RECTAL at 05:28

## 2024-01-01 RX ADMIN — Medication 2 PACKET(S): at 10:00

## 2024-01-01 RX ADMIN — LORAZEPAM 0.25 MILLIGRAM(S): 1 TABLET ORAL at 23:00

## 2024-01-01 RX ADMIN — PANTOPRAZOLE 40 MILLIGRAM(S): 40 TABLET, DELAYED RELEASE ORAL at 10:42

## 2024-01-01 RX ADMIN — SODIUM CHLORIDE 1500 MILLILITER(S): 9 INJECTION, SOLUTION INTRAMUSCULAR; INTRAVENOUS; SUBCUTANEOUS at 16:50

## 2024-01-01 RX ADMIN — PIPERACILLIN AND TAZOBACTAM 25 GRAM(S): 3; .375 INJECTION, POWDER, LYOPHILIZED, FOR SOLUTION INTRAVENOUS at 00:14

## 2024-01-01 RX ADMIN — ENOXAPARIN SODIUM 30 MILLIGRAM(S): 60 INJECTION INTRAVENOUS; SUBCUTANEOUS at 11:34

## 2024-01-01 RX ADMIN — Medication 2 MILLIGRAM(S): at 21:21

## 2024-01-01 RX ADMIN — AZITHROMYCIN MONOHYDRATE 255 MILLIGRAM(S): 200 POWDER, FOR SUSPENSION ORAL at 22:41

## 2024-01-01 RX ADMIN — PIPERACILLIN AND TAZOBACTAM 25 GRAM(S): 3; .375 INJECTION, POWDER, LYOPHILIZED, FOR SOLUTION INTRAVENOUS at 13:41

## 2024-01-01 RX ADMIN — Medication 1 APPLICATION(S): at 22:41

## 2024-01-01 RX ADMIN — Medication 2 MILLIGRAM(S): at 05:45

## 2024-01-01 RX ADMIN — PANTOPRAZOLE 40 MILLIGRAM(S): 40 TABLET, DELAYED RELEASE ORAL at 06:06

## 2024-01-01 RX ADMIN — MIDODRINE HYDROCHLORIDE 10 MILLIGRAM(S): 5 TABLET ORAL at 15:12

## 2024-01-01 RX ADMIN — Medication 2 MILLIGRAM(S): at 16:21

## 2024-01-01 RX ADMIN — Medication 2 MILLIGRAM(S): at 16:06

## 2024-01-01 RX ADMIN — ACETAMINOPHEN 675 MILLIGRAM(S): 80 SOLUTION/ DROPS ORAL at 04:48

## 2024-01-01 RX ADMIN — LORAZEPAM 0.25 MILLIGRAM(S): 1 TABLET ORAL at 13:27

## 2024-01-01 RX ADMIN — HYDROCORTISONE 50 MILLIGRAM(S): 100 ENEMA RECTAL at 21:49

## 2024-01-01 RX ADMIN — CHLORHEXIDINE GLUCONATE 1 APPLICATION(S): 1.2 RINSE ORAL at 09:12

## 2024-01-01 RX ADMIN — ACETAMINOPHEN 650 MILLIGRAM(S): 80 SOLUTION/ DROPS ORAL at 17:15

## 2024-01-01 RX ADMIN — Medication 1 APPLICATION(S): at 10:30

## 2024-01-01 RX ADMIN — HYDROCORTISONE 50 MILLIGRAM(S): 100 ENEMA RECTAL at 05:04

## 2024-01-01 RX ADMIN — PIPERACILLIN AND TAZOBACTAM 25 GRAM(S): 3; .375 INJECTION, POWDER, LYOPHILIZED, FOR SOLUTION INTRAVENOUS at 16:10

## 2024-01-01 RX ADMIN — AZITHROMYCIN MONOHYDRATE 255 MILLIGRAM(S): 200 POWDER, FOR SUSPENSION ORAL at 21:39

## 2024-01-01 RX ADMIN — MIDODRINE HYDROCHLORIDE 5 MILLIGRAM(S): 5 TABLET ORAL at 05:36

## 2024-01-01 RX ADMIN — CHLORHEXIDINE GLUCONATE 1 APPLICATION(S): 1.2 RINSE ORAL at 09:52

## 2024-01-01 RX ADMIN — PIPERACILLIN AND TAZOBACTAM 25 GRAM(S): 3; .375 INJECTION, POWDER, LYOPHILIZED, FOR SOLUTION INTRAVENOUS at 06:05

## 2024-01-01 RX ADMIN — Medication 2 MILLIGRAM(S): at 13:53

## 2024-01-01 RX ADMIN — SODIUM CHLORIDE 50 MILLILITER(S): 9 INJECTION, SOLUTION INTRAVENOUS at 10:33

## 2024-01-01 RX ADMIN — PANTOPRAZOLE 40 MILLIGRAM(S): 40 TABLET, DELAYED RELEASE ORAL at 07:52

## 2024-01-01 RX ADMIN — PANTOPRAZOLE 40 MILLIGRAM(S): 40 TABLET, DELAYED RELEASE ORAL at 06:07

## 2024-01-01 RX ADMIN — LORAZEPAM 0.25 MILLIGRAM(S): 1 TABLET ORAL at 08:49

## 2024-01-01 RX ADMIN — ENOXAPARIN SODIUM 30 MILLIGRAM(S): 60 INJECTION INTRAVENOUS; SUBCUTANEOUS at 11:41

## 2024-01-01 RX ADMIN — ACETAMINOPHEN 650 MILLIGRAM(S): 80 SOLUTION/ DROPS ORAL at 18:17

## 2024-01-01 RX ADMIN — Medication 2 MILLIGRAM(S): at 02:49

## 2024-01-01 RX ADMIN — ACETAMINOPHEN 650 MILLIGRAM(S): 80 SOLUTION/ DROPS ORAL at 09:12

## 2024-01-01 RX ADMIN — Medication 2 MILLIGRAM(S): at 05:20

## 2024-01-01 RX ADMIN — Medication 20 MILLIGRAM(S): at 18:26

## 2024-01-01 RX ADMIN — Medication 2 MILLIGRAM(S): at 03:09

## 2024-01-01 RX ADMIN — AZITHROMYCIN MONOHYDRATE 255 MILLIGRAM(S): 200 POWDER, FOR SUSPENSION ORAL at 22:24

## 2024-01-01 RX ADMIN — PIPERACILLIN AND TAZOBACTAM 25 GRAM(S): 3; .375 INJECTION, POWDER, LYOPHILIZED, FOR SOLUTION INTRAVENOUS at 14:10

## 2024-01-01 RX ADMIN — PIPERACILLIN AND TAZOBACTAM 25 GRAM(S): 3; .375 INJECTION, POWDER, LYOPHILIZED, FOR SOLUTION INTRAVENOUS at 05:04

## 2024-01-01 RX ADMIN — CHLORHEXIDINE GLUCONATE 1 APPLICATION(S): 1.2 RINSE ORAL at 09:56

## 2024-01-01 RX ADMIN — ENOXAPARIN SODIUM 30 MILLIGRAM(S): 60 INJECTION INTRAVENOUS; SUBCUTANEOUS at 10:30

## 2024-01-01 RX ADMIN — PIPERACILLIN AND TAZOBACTAM 25 GRAM(S): 3; .375 INJECTION, POWDER, LYOPHILIZED, FOR SOLUTION INTRAVENOUS at 08:24

## 2024-01-01 RX ADMIN — Medication 2 MILLIGRAM(S): at 08:50

## 2024-01-01 RX ADMIN — PIPERACILLIN AND TAZOBACTAM 200 GRAM(S): 3; .375 INJECTION, POWDER, LYOPHILIZED, FOR SOLUTION INTRAVENOUS at 09:58

## 2024-01-01 RX ADMIN — Medication 2 MILLIGRAM(S): at 18:55

## 2024-01-01 RX ADMIN — CEFTRIAXONE SODIUM 1000 MILLIGRAM(S): 1 INJECTION, POWDER, FOR SOLUTION INTRAMUSCULAR; INTRAVENOUS at 10:36

## 2024-01-01 RX ADMIN — Medication 2 MILLIGRAM(S): at 14:30

## 2024-01-01 RX ADMIN — ACETAMINOPHEN 675 MILLIGRAM(S): 80 SOLUTION/ DROPS ORAL at 00:57

## 2024-01-01 RX ADMIN — POTASSIUM CHLORIDE 40 MILLIEQUIVALENT(S): 600 TABLET, FILM COATED, EXTENDED RELEASE ORAL at 09:50

## 2024-01-01 RX ADMIN — MIDODRINE HYDROCHLORIDE 10 MILLIGRAM(S): 5 TABLET ORAL at 22:44

## 2024-01-01 RX ADMIN — POTASSIUM CHLORIDE 100 MILLIEQUIVALENT(S): 600 TABLET, FILM COATED, EXTENDED RELEASE ORAL at 11:26

## 2024-01-01 RX ADMIN — POTASSIUM CHLORIDE 40 MILLIEQUIVALENT(S): 600 TABLET, FILM COATED, EXTENDED RELEASE ORAL at 20:58

## 2024-01-01 RX ADMIN — Medication 2 MILLIGRAM(S): at 14:13

## 2024-01-01 RX ADMIN — HYDROCORTISONE 50 MILLIGRAM(S): 100 ENEMA RECTAL at 05:49

## 2024-01-01 RX ADMIN — Medication 2 MILLIGRAM(S): at 16:39

## 2024-01-01 RX ADMIN — MIDODRINE HYDROCHLORIDE 10 MILLIGRAM(S): 5 TABLET ORAL at 05:37

## 2024-01-01 RX ADMIN — PIPERACILLIN AND TAZOBACTAM 25 GRAM(S): 3; .375 INJECTION, POWDER, LYOPHILIZED, FOR SOLUTION INTRAVENOUS at 14:32

## 2024-01-01 RX ADMIN — LEVOTHYROXINE SODIUM 75 MICROGRAM(S): 175 TABLET ORAL at 06:06

## 2024-01-01 RX ADMIN — LEVOTHYROXINE SODIUM 75 MICROGRAM(S): 175 TABLET ORAL at 05:04

## 2024-01-01 RX ADMIN — Medication 2 MILLIGRAM(S): at 22:03

## 2024-01-01 RX ADMIN — Medication 1 APPLICATION(S): at 21:39

## 2024-01-01 RX ADMIN — ENOXAPARIN SODIUM 30 MILLIGRAM(S): 60 INJECTION INTRAVENOUS; SUBCUTANEOUS at 11:19

## 2024-05-10 NOTE — ED PROVIDER NOTE - CONDITION AT DISCHARGE:
Patient Seen in: [facility] Emergency Department    History     Chief Complaint   Patient presents with    Abdominal Pain       HPI    The patient presents to the ED with mother for symptoms of mild abdominal pain mild sore throat starting today.  No other complaints.  No fevers.  2 other siblings are sick with the same.    History reviewed. No past medical history on file.    History reviewed. No past surgical history on file.      Medications :  (Not in a hospital admission)       No family history on file.    Smoking Status:   Social History     Socioeconomic History    Marital status: Single   Tobacco Use    Smoking status: Never    Smokeless tobacco: Never       Constitutional and vital signs reviewed.      Social History and Family History elements reviewed from today, pertinent positives to the presenting problem noted.    Physical Exam     ED Triage Vitals [05/09/24 2138]   BP    Pulse 96   Resp 24   Temp 98.3 °F (36.8 °C)   Temp src    SpO2 99 %   O2 Device        All measures to prevent infection transmission during my interaction with the patient were taken. Handwashing was performed prior to and after the exam.  Stethoscope and any equipment used during my examination was cleaned with super sani-cloth germicidal wipes following the exam.     Physical Exam  Constitutional:       General: He is active. He is not in acute distress.     Appearance: He is well-developed. He is not ill-appearing or toxic-appearing.   HENT:      Head: Atraumatic.      Nose: Nose normal.      Mouth/Throat:      Mouth: Mucous membranes are moist.      Pharynx: Oropharynx is clear.   Eyes:      General:         Right eye: No discharge.         Left eye: No discharge.      Conjunctiva/sclera: Conjunctivae normal.   Cardiovascular:      Rate and Rhythm: Normal rate.      Pulses: Pulses are strong.   Pulmonary:      Effort: Pulmonary effort is normal. No respiratory distress.      Breath sounds: Normal breath sounds.    Abdominal:      Palpations: Abdomen is soft.      Tenderness: There is no abdominal tenderness.   Musculoskeletal:         General: No deformity.   Skin:     General: Skin is warm.      Findings: No rash.   Neurological:      Mental Status: He is alert.      Coordination: Coordination normal.         ED Course        Labs Reviewed   POCT RAPID STREP - Abnormal; Notable for the following components:       Result Value    POCT Rapid Strep Positive (*)     All other components within normal limits       As Interpreted by me    Imaging Results Available and Reviewed while in ED: No results found.  ED Medications Administered: Medications - No data to display      MDM     Vitals:    05/09/24 2138 05/09/24 2140   Pulse: 96    Resp: 24    Temp: 98.3 °F (36.8 °C)    SpO2: 99%    Weight:  51.7 kg     *I personally reviewed and interpreted all ED vitals.    Pulse Ox: 99%, Room air, Normal     Differential Diagnosis/ Diagnostic Considerations: Viral syndrome, strep pharyngitis, other    Complicating Factors: The patient already has does not have a problem list on file. to contribute to the complexity of this ED evaluation.    Medical Decision Making  Presents to the ED with sore throat and abdominal pain.  Strep testing positive.  Stable for discharge home with oral antibiotics and outpatient follow-up.    Problems Addressed:  Strep pharyngitis: acute illness or injury    Amount and/or Complexity of Data Reviewed  Independent Historian: parent     Details: Mother provides history details  Labs: ordered. Decision-making details documented in ED Course.    Risk  Prescription drug management.        Condition upon leaving the department: Stable    Disposition and Plan     Clinical Impression:  1. Strep pharyngitis        Disposition:  Discharge    Follow-up:  Anuja Cavazos MD  [address] [place] [id number] [phone]    Schedule an appointment as soon as possible for a visit in 3  day(s)        Medications Prescribed:  Discharge Medication List as of 5/9/2024 10:34 PM        START taking these medications    Details   amoxicillin 250 MG/5ML Oral Recon Susp Take 10 mL (500 mg total) by mouth 2 (two) times daily for 10 days., Normal, Disp-200 mL, R-0      ondansetron 4 MG/5ML Oral Solution Take 5 mL (4 mg total) by mouth every 8 (eight) hours as needed., Normal, Disp-50 mL, R-0                          Improved

## 2024-08-19 NOTE — H&P ADULT - HISTORY OF PRESENT ILLNESS
79 y/o female with a PMHx of anxiety, UTIs, alzheimer's, T2DM, fibromyalgia, hypothyroidism, diverticulosis and diverticular surgery, and HLD presents BIBA to the ED from  home s/p vomiting after eating. As per EMS, pt also lethargic and 100% O2 sat en route to ED. Pt denies recent falls. Pt unsure why she is in the ED. Unable to obtain complete HPI 2/2 alzheimer's and AAO x1. Chart completed via family and EMS report. Ambulatory Visit  Name: Krupa Baxter      : 1950      MRN: [id number]  Encounter Provider: ELAYNE Dominguez  Encounter Date: 2024   Encounter department: [facility] INTERNAL MEDICINE    Assessment & Plan   1. Chronic RUQ pain  Assessment & Plan:  Normal ultrasound.   Symptoms intermittent for 8 years.   Follow a low fat diet.   Check HIDA scan.   Discussed general surgery evaluation if positive and GI evaluation if negative and pain persists.   Orders:  -     NM hepatobiliary; Future; Expected date: 2024  -     Comprehensive metabolic panel; Future  -     Lipase; Future  -     CBC and differential; Future  -     Comprehensive metabolic panel  -     Lipase  -     CBC and differential  2. Biliary colic  -     Comprehensive metabolic panel; Future  -     Lipase; Future  -     CBC and differential; Future  -     Comprehensive metabolic panel  -     Lipase  -     CBC and differential       History of Present Illness     Krupa is here today with complaints of RUQ pain  She has had intermittent pain for about 8 years.   She went awhile without pain however the last few weeks its been worse. It has been more constant. Mostly a dull ache but sometimes a sharp pain that radiates to her back.   She occasionally gets epigastric pain after she eats but resolves in a few minutes.   Recently she had 2 episodes of vomiting after eating a fatty meal.   RUQ US did not show any gallstones.     She is worried since all the women in her family have gallbladder issues.                 Review of Systems   Constitutional:  Negative for activity change, appetite change and fatigue.   Respiratory:  Negative for shortness of breath.    Cardiovascular:  Negative for chest pain.   Gastrointestinal:  Positive for abdominal pain, nausea and vomiting. Negative for constipation and diarrhea.   Genitourinary:  Negative for difficulty urinating.   Musculoskeletal:  Positive for back pain.   Neurological:   "Negative for dizziness, light-headedness and headaches.       Objective     /82   Pulse 79   Temp (!) 96.7 °F (35.9 °C)   Ht 5' 2.5\" (1.588 m)   Wt 66.2 kg (146 lb)   SpO2 96%   BMI 26.28 kg/m²     Physical Exam  Vitals reviewed.   Constitutional:       Appearance: Normal appearance.   HENT:      Head: Normocephalic and atraumatic.   Eyes:      Conjunctiva/sclera: Conjunctivae normal.   Cardiovascular:      Rate and Rhythm: Normal rate and regular rhythm.      Heart sounds: Normal heart sounds.   Abdominal:      General: Bowel sounds are normal.      Palpations: Abdomen is soft.      Tenderness: There is no abdominal tenderness.   Skin:     General: Skin is warm and dry.          Neurological:      Mental Status: She is alert and oriented to person, place, and time.   Psychiatric:         Mood and Affect: Mood normal.         Behavior: Behavior normal.       Administrative Statements   I have spent a total time of 25   minutes in caring for this patient on the day of the visit/encounter including Diagnostic results, Risks and benefits of tx options, Instructions for management, and Patient and family education.        " 81 y/o Female with a PMHx of anxiety, UTIs, alzheimer's, T2DM, fibromyalgia, hypothyroidism, diverticulosis and diverticular surgery, and HLD presents BIBA to the ED from  home after syncope following vomiting after eating. As per EMS, patient was also lethargic and 100% O2 sat en route to ED. Patient denies recent falls. Patient unsure why she is in the ED. Unable to obtain complete HPI 2/2 alzheimer's and AAO x1. Chart completed via family and EMS report. Talked with daughter over phone and got the history. As per the daughter, patient was eating some cereal while sitting at the dining table area. Suddenly she vomited twice and then passed out. She came around after few minutes. She had no fall, no injury or no seizure. She had no sickness in the last few days. No fever at home.     Family hx:  Unable to get detail family history due to patient's dementia.

## 2024-09-25 ENCOUNTER — RESULT REVIEW (OUTPATIENT)
Age: 84
End: 2024-09-25

## 2024-09-26 ENCOUNTER — TRANSCRIPTION ENCOUNTER (OUTPATIENT)
Age: 84
End: 2024-09-26

## 2024-10-02 ENCOUNTER — TRANSCRIPTION ENCOUNTER (OUTPATIENT)
Age: 84
End: 2024-10-02

## 2024-12-10 NOTE — ED ADULT NURSE NOTE - OBJECTIVE STATEMENT
Pt is a 83 y/o female who presents to the ED with c/o cough and fever since Saturday. Pt has hx of alzheimer's. as per family member no other complaints,  at bedside.

## 2024-12-10 NOTE — H&P ADULT - HISTORY OF PRESENT ILLNESS
Ms. Aguilera is a 85 YO F w/pmhx of Alzheimer's disease, hypothyroidism, DM2, MSSA bacteremia, diverticulitis s/p sigmoidectomy, HLD presents to ED w/fever and cough. Patient found to have pneumonia. UA negative. Received sepsis fluid bolus and additional fluid but remains hypotensive. Patient is requiring Levophed. Lactate trended up to 3.0. Mild GAYLA based on previous labs. Received Azithromycin and Zosyn.  at bedside. DNR/DNI

## 2024-12-10 NOTE — ED ADULT TRIAGE NOTE - CHIEF COMPLAINT QUOTE
Pt presents to the ED for fever, cough since Saturday.pt has hx of alzheimer's. as per family member no other complaints,

## 2024-12-10 NOTE — ED PROVIDER NOTE - ATTENDING APP SHARED VISIT CONTRIBUTION OF CARE
I, Kb Sebastian DO, personally saw the patient with ACP.  I have personally performed a face to face diagnostic evaluation on this patient.  I have reviewed the ACP note and agree with the history, exam, and plan of care, except as noted.  I personally saw the patient and performed a substantive portion of the visit including all aspects of the medical decision making.

## 2024-12-10 NOTE — H&P ADULT - NSHPPHYSICALEXAM_GEN_ALL_CORE
Physical Examination:    General: No acute distress    HEENT: Pupils equal, reactive to light.  Symmetric    PULM: Clear to auscultation bilaterally, no significant sputum production    CVS: Regular rate and rhythm, no murmurs, rubs, or gallops    ABD: Soft, nondistended, nontender, normoactive bowel sounds, no masses    EXT: No edema, nontender    SKIN: Warm and well perfused    NEURO: A&Ox0

## 2024-12-10 NOTE — PROVIDER CONTACT NOTE (EICU) - BACKGROUND
84-year-old female with a past medical history of hypothyroid, Alzheimer's, high cholesterol, diverticulosis presents with subjective fevers with the dry cough. Found to have LLL pna on CXR. Admitted for septic shock.

## 2024-12-10 NOTE — ED PROVIDER NOTE - CARE PLAN
1 Principal Discharge DX:	Acute UTI  Secondary Diagnosis:	Fever  Secondary Diagnosis:	LLL pneumonia

## 2024-12-10 NOTE — H&P ADULT - ASSESSMENT
Assessment:  Ms. Aguilera is a 83 YO F w/pmhx of Alzheimer's disease, hypothyroidism, DM2, MSSA bacteremia, diverticulitis s/p sigmoidectomy, HLD presents to ED w/fever and cough. Patient found to have pneumonia. UA negative. Received sepsis fluid bolus and additional fluid but remains hypotensive. Patient is requiring Levophed. Lactate trended up to 3.0. Mild GAYLA based on previous labs. Received Azithromycin and Zosyn.  at bedside. DNR/DNI     Problem List:  1. Septic shock 2/2  2. Pneumonia vs  3. Possible UTI (pansensitive in past)  4. GAYLA  5. Lactic acidosis     Plan:  Neuro:  Baseline dementia  Monitor mental status, avoid deliriogenic medications. Pain & fever control as needed    CV:  Shock, likely septic, receive 30cc/kg fluid bolus   Actively titrating levophed to maintain MAP>65mmHg  Was on home Midodrine   Start Midodrine to wean off vasopressors   Obtain cortisol in AM to r/o adrenal insufficiency     Pulm:  Respiratory status stable   Will treat for pneumonia   Try and obtain sputum culture    Renal:  GAYLA, appears dry, start on IVF, should help w/lactate clearance   Strict I/Os, goal UOP >0.5cc/kg/hr. Trend renal function and electrolytes, replete as needed. Avoid nephrotoxic agents    GI:  PPI  Diet if passes speech and swallow    ID:  Zosyn and azithromycin for pneumonia and possible UTI  F/u blood and urine cultures  COVID/influenza negative   Obtain sputum cultures if produces  MRSA swab   Trend WBC/fevers/procalcitonin     Heme:  Lovenox for DVT prophylaxis     Endo:  Goal blood glucose <180. AM TSH, T4. Synthroid     DNR/DNI    Discussed case w/eICU Dr. Mc henson     CRITICAL CARE TIME SPENT: 35 minutes assessing presenting problems of acute illness, which pose high probability of life threatening deterioration or end organ damage/dysfunction, as well as medical decision making including initiating plan of care, reviewing data, reviewing radiologic exams, discussing with multidisciplinary team,  discussing goals of care with patient/family, and writing this note.  Non-inclusive of procedures performed  Date of entry of this note is equal to the date of services rendered

## 2024-12-10 NOTE — ED PROVIDER NOTE - NSICDXPASTMEDICALHX_GEN_ALL_CORE_FT
PAST MEDICAL HISTORY:  Acute type 2 diabetes mellitus with manifestations boderline- diet controlled    Alzheimer disease     Ankle fracture s/p right- cast    Diverticulosis     Fibromyalgia     Frequent UTI     H/O: hypothyroidism     Hypercholesterolemia     Hypothyroidism

## 2024-12-10 NOTE — PROVIDER CONTACT NOTE (EICU) - ACTION/TREATMENT ORDERED:
I have personally provided care, evaluated the patient remotely, reviewed all orders/labs/imaging and/or discussed case with site/Tele provider(s) through telehealth encounter.     Total CC Telehealth Time : 60min

## 2024-12-10 NOTE — ED PROVIDER NOTE - OBJECTIVE STATEMENT
84-year-old female with a past medical history of hypothyroid, Alzheimer's, high cholesterol, diverticulosis presents with  and daughter for fever and cough.  Per , both of them went to go see family on Saturday and Sunday noticed the subjective fevers with the dry cough.  Has been has not been able to check her temperature, but states that she felt warm.  Last dose of Tylenol was given last night.  Patient still has good appetite and did have breakfast this morning.  Patient unable to give an accurate description of her symptoms due to her history of Alzheimer's. -Mickey Murphy PA-C

## 2024-12-10 NOTE — ED PROVIDER NOTE - NSICDXPASTSURGICALHX_GEN_ALL_CORE_FT
PAST SURGICAL HISTORY:  Diverticulitis of sigmoid colon s/p sigmoidectomy    History of cholecystectomy     History of tonsillectomy     No significant past surgical history     S/P cataract surgery, left

## 2024-12-10 NOTE — ED PROVIDER NOTE - CLINICAL SUMMARY MEDICAL DECISION MAKING FREE TEXT BOX
Kb Sebastian DO (Attending): 84-year-old female with a past medical history of hypothyroid, Alzheimer's, high cholesterol, diverticulosis presents with  and daughter for fever and cough.  Per , both of them went to go see family on Saturday and Sunday noticed the subjective fevers with the dry cough. Patient well-appearing but lethargic, she is off her baseline, hemodynamically stable, afebrile.  Differential includes not limited to UTI, pneumonia, electrolyte abnormality.  Plan for septic workup, chest x-ray, EKG.  Reassess

## 2024-12-10 NOTE — ED ADULT TRIAGE NOTE - MODE OF ARRIVAL
Pt comes in for bilateral leg swelling and SOB for 1 week. Went in last week and had chest xray and EKG which were negative.         
Walk in

## 2024-12-10 NOTE — PATIENT PROFILE ADULT - FALL HARM RISK - HARM RISK INTERVENTIONS
Assistance with ambulation/Assistance OOB with selected safe patient handling equipment/Communicate Risk of Fall with Harm to all staff/Discuss with provider need for PT consult/Monitor gait and stability/Reinforce activity limits and safety measures with patient and family/Tailored Fall Risk Interventions/Visual Cue: Yellow wristband and red socks/Bed in lowest position, wheels locked, appropriate side rails in place/Call bell, personal items and telephone in reach/Instruct patient to call for assistance before getting out of bed or chair/Non-slip footwear when patient is out of bed/[place] to call system/Physically safe environment - no spills, clutter or unnecessary equipment/Purposeful Proactive Rounding/Room/bathroom lighting operational, light cord in reach

## 2024-12-10 NOTE — ED PROVIDER NOTE - PROGRESS NOTE DETAILS
BP has been fluctuating between lower 100s systolic and 90s. 2.5L of fluid given and the latest BP was 90/50 with a MAP of 63. Attempted to admit to medicine; however, hospitalist wants ICU consulted due to the low BP.  SPoke with ICU PA. WIll come to see pt. -Mickey Murphy PA-C CXR read by radiologist showing LL PNA. Will add z pack to her treatment regimen. -Mickey Murphy PA-C ICU PA came to se pt. Pt to be admitted under Dr Gutierrez. -Mickey Murphy PA-C

## 2024-12-10 NOTE — ED ADULT NURSE NOTE - NSFALLRISKINTERV_ED_ALL_ED

## 2024-12-10 NOTE — PROVIDER CONTACT NOTE (EICU) - RECOMMENDATIONS
levophed for MAP > 65mmHg  broad abx for Urine / Lung coverage.  s/p 3 Liters IVF. hold further fluids.  DVT ppx.  Admit to IC for septic shock.

## 2024-12-11 NOTE — SWALLOW BEDSIDE ASSESSMENT ADULT - PHARYNGEAL PHASE
onset of pharyngeal swallow within timely limits and no overt s/s aspiration except for cough response after thin liquids.

## 2024-12-11 NOTE — SWALLOW BEDSIDE ASSESSMENT ADULT - CONSISTENCIES ADMINISTERED
no other textures offered 2/2 mental status and dysphagia profile./thin liquid/moderately thick/mildly thick/pureed

## 2024-12-11 NOTE — SWALLOW BEDSIDE ASSESSMENT ADULT - SWALLOW EVAL: DIAGNOSIS
dysphagia (likely progressive) in a setting of advanced dementia.  Dysphagia profile is c/w typical regression of eating skills for po intake.. Note pt is still able to take po on a limited repertoire of foods and liquids, but must be fed carefully.

## 2024-12-11 NOTE — SWALLOW BEDSIDE ASSESSMENT ADULT - SWALLOW EVAL: RECOMMENDED FEEDING/EATING TECHNIQUES
tell pt what is presented; unclear how much pt understands./allow for swallow between intakes/alternate food with liquid/check mouth frequently for oral residue/pocketing/crush medication (when feasible)/maintain upright posture during/after eating for 30 mins/no straws/position upright (90 degrees)/small sips/bites

## 2024-12-11 NOTE — PHYSICAL THERAPY INITIAL EVALUATION ADULT - PERTINENT HX OF CURRENT PROBLEM, REHAB EVAL
pt presents to ED w/fever and cough. Patient found to have pneumonia. UA negative. Received sepsis fluid bolus and additional fluid but remains hypotensive. Patient is requiring Levophed. Lactate trended up to 3.0. adm w/ 1. Septic shock 2/2  2. Pneumonia vs  3. Possible UTI (pansensitive in past)  4. GAYLA  5. Lactic acidosis

## 2024-12-11 NOTE — PROGRESS NOTE ADULT - ASSESSMENT
A/P: 84 female P/W Septic shock from a UTI  Dementia  Hypothyroidism    Plan:  ICU    PULM: patient on Room air so its not likely she has a L Sided Pneumonia    Cardio: Hypotensive from septic shock. Wean Levo added Midodrine.   states that while she has midodrine at hime she does not take it    Renal: Replace lytes    GI: PO Diet    ENDO: TSH 15.  Will increase Synthroid to 75 from 50    ID: Cultures PND.  Continue Zosyn and Z-Max    Lovenox DVT Prophylaxis

## 2024-12-11 NOTE — SWALLOW BEDSIDE ASSESSMENT ADULT - ORAL PHASE
immediate bolus formation with observable buccal and lingual action for AP transfer. Pt appears to be operating on a very basic level with a motor reaction to a sensory input.

## 2024-12-11 NOTE — PROGRESS NOTE ADULT - SUBJECTIVE AND OBJECTIVE BOX
CC:    HPI:  Ms. Aguilera is a 85 YO F w/pmhx of Alzheimer's disease, hypothyroidism, DM2, MSSA bacteremia, diverticulitis s/p sigmoidectomy, HLD presents to ED w/fever and cough. Patient found to have pneumonia. UA negative. Received sepsis fluid bolus and additional fluid but remains hypotensive. Patient is requiring Levophed. Lactate trended up to 3.0. Mild GAYLA based on previous labs. Received Azithromycin and Zosyn.  at bedside. DNR/DNI      12/11: Patient remains on pressors       PAST MEDICAL & SURGICAL HISTORY:  Diverticulosis      H/O: hypothyroidism      Hypercholesterolemia      Fibromyalgia      Acute type 2 diabetes mellitus with manifestations  boderline- diet controlled      Ankle fracture  s/p right- cast      Alzheimer disease      Hypothyroidism      Frequent UTI      History of cholecystectomy      History of tonsillectomy      Diverticulitis of sigmoid colon  s/p sigmoidectomy      S/P cataract surgery, left      No significant past surgical history          FAMILY HISTORY:  No pertinent family history in first degree relatives        Social Hx:    Allergies    Compazine (Other)  Compazine (Unknown)    Intolerances          Weight (kg): 45.359 (12-10 @ 17:23)    ICU Vital Signs Last 24 Hrs  T(C): 37 (11 Dec 2024 04:00), Max: 38.1 (10 Dec 2024 21:41)  T(F): 98.6 (11 Dec 2024 04:00), Max: 100.5 (10 Dec 2024 21:41)  HR: 81 (11 Dec 2024 11:00) (67 - 89)  BP: 93/43 (11 Dec 2024 11:00) (75/41 - 119/53)  BP(mean): 58 (11 Dec 2024 11:00) (50 - 77)  ABP: --  ABP(mean): --  RR: 21 (11 Dec 2024 11:00) (11 - 23)  SpO2: 96% (11 Dec 2024 11:00) (91% - 100%)    O2 Parameters below as of 10 Dec 2024 22:41  Patient On (Oxygen Delivery Method): room air                I&O's Summary    10 Dec 2024 07:01  -  11 Dec 2024 07:00  --------------------------------------------------------  IN: 680 mL / OUT: 150 mL / NET: 530 mL                              10.5   9.94  )-----------( 178      ( 11 Dec 2024 05:49 )             33.4       12-11    139  |  112[H]  |  16  ----------------------------<  122[H]  3.8   |  24  |  0.89    Ca    8.4[L]      11 Dec 2024 05:49  Phos  1.7     12-11  Mg     1.8     12-11    TPro  6.2  /  Alb  2.7[L]  /  TBili  1.6[H]  /  DBili  x   /  AST  16  /  ALT  17  /  AlkPhos  74  12-11                Urinalysis Basic - ( 11 Dec 2024 05:49 )    Color: x / Appearance: x / SG: x / pH: x  Gluc: 122 mg/dL / Ketone: x  / Bili: x / Urobili: x   Blood: x / Protein: x / Nitrite: x   Leuk Esterase: x / RBC: x / WBC x   Sq Epi: x / Non Sq Epi: x / Bacteria: x        MEDICATIONS  (STANDING):  azithromycin  IVPB 500 milliGRAM(s) IV Intermittent every 24 hours  chlorhexidine 4% Liquid 1 Application(s) Topical daily  enoxaparin Injectable 30 milliGRAM(s) SubCutaneous every 24 hours  influenza  Vaccine (HIGH DOSE) 0.5 milliLiter(s) IntraMuscular once  lactated ringers. 1000 milliLiter(s) (50 mL/Hr) IV Continuous <Continuous>  levothyroxine 75 MICROGram(s) Oral daily  midodrine 10 milliGRAM(s) Oral every 8 hours  norepinephrine Infusion 0.05 MICROgram(s)/kG/Min (4.25 mL/Hr) IV Continuous <Continuous>  pantoprazole    Tablet 40 milliGRAM(s) Oral before breakfast  piperacillin/tazobactam IVPB.. 3.375 Gram(s) IV Intermittent every 8 hours  potassium phosphate / sodium phosphate Powder (PHOS-NaK) 2 Packet(s) Oral two times a day    MEDICATIONS  (PRN):      DVT Prophylaxis: Lovenox    Advanced Directives:  Discussed with:    Visit Information: 30 min    ** Time is exclusive of billed procedures and/or teaching and/or routine family updates.

## 2024-12-11 NOTE — PHYSICAL THERAPY INITIAL EVALUATION ADULT - PASSIVE RANGE OF MOTION EXAMINATION, REHAB EVAL
except B shld ~90, lacks KE (pt resisting- son reports not contracted , able to amb)/bilateral upper extremity Passive ROM was WFL (within functional limits)/bilateral lower extremity Passive ROM was WFL (within functional limits)

## 2024-12-11 NOTE — SWALLOW BEDSIDE ASSESSMENT ADULT - SWALLOW EVAL: ORAL MUSCULATURE
lower lip with inspissating saliva: removed with liquid wash./unable to assess due to poor participation/comprehension

## 2024-12-11 NOTE — SWALLOW BEDSIDE ASSESSMENT ADULT - ORAL PREPARATORY PHASE
has oral alerting and acceptance to the presence of the spoon and cup at the lip, but slow closure and weak seal on cup. pt shows more immediate lip pursing for spoonstripping when spoon is placed further back in the mouth..

## 2024-12-11 NOTE — SWALLOW BEDSIDE ASSESSMENT ADULT - SLP GENERAL OBSERVATIONS
pt seated in bed, dozing but easily rousable; fixed open stare and flat facial affect. pt gives no indication of understanding what is said to her.. No verbal output, or vocalization.

## 2024-12-11 NOTE — PHYSICAL THERAPY INITIAL EVALUATION ADULT - ADDITIONAL COMMENTS
son is HHA x few hrs in pm. 1 PA to transfer, 2 PA to amb (1 fwd of pt to lead/guide and 1 behind), has WC, hosp bed

## 2024-12-11 NOTE — PHARMACOTHERAPY INTERVENTION NOTE - COMMENTS
Medication Reconciliation completed using Dr Quiñonez and confirmed home medications with  at [phone] as patient was unable to verbalize their home medications

## 2024-12-11 NOTE — PHYSICAL THERAPY INITIAL EVALUATION ADULT - GENERAL OBSERVATIONS, REHAB EVAL
pt rec'd supine in bed in ICU, son present. pt primarily non-verbal at baseline (occ 1 word answer per son), pt appears fearful- eyes wide, cues to calm/relax unsuccessful. BP low, on levo

## 2024-12-11 NOTE — SWALLOW BEDSIDE ASSESSMENT ADULT - NS SPL SWALLOW CLINIC TRIAL FT
NOTE;  DURING THE TRIALS; PT STARTED TO "LOSE" THE MOTOR SEQUENCE FOR ACCEPTING AND MANAGING THE BOLUS;  SHOWED HESITATING MOVEMENTS OF THE LIPS, UNABLE TO COORDINATE LIP CLOSURE WITH SPOON.   This is c/w with dysphagia in dementia.  Stop feeding and resume after a while.   Rx: maintain puree and moderately thick liquids for now.  Disc with Nsg. Service will follow

## 2024-12-11 NOTE — PROGRESS NOTE ADULT - ASSESSMENT
Assessment:  Ms. Aguilera is a 83 YO F w/pmhx of Alzheimer's disease, hypothyroidism, DM2, MSSA bacteremia, diverticulitis s/p sigmoidectomy, HLD presents to ED w/fever and cough. Patient found to have pneumonia. UA negative. Received sepsis fluid bolus and additional fluid but remains hypotensive. Patient is requiring Levophed. Lactate trended up to 3.0. Mild GAYLA based on previous labs. Received Azithromycin and Zosyn.  at bedside. DNR/DNI     Problem List:  1. Septic shock 2/2  2. Pneumonia vs  3. Possible UTI (pansensitive in past)  4. GAYLA  5. Lactic acidosis     Plan:  Neuro:  Baseline dementia  Monitor mental status, avoid deliriogenic medications. Pain & fever control as needed    CV:  Shock, likely septic, received 30cc/kg fluid bolus   Actively titrating levophed to maintain MAP>65mmHg  Midodrine to wean off vasopressors   F/u cortisol      Pulm:  Respiratory status stable   Possible pneumonia   Try and obtain sputum culture    Renal:  GAYLA, improved, continue IVF  Strict I/Os, goal UOP >0.5cc/kg/hr. Trend renal function and electrolytes, replete as needed. Avoid nephrotoxic agents    GI:  PPI  Diet as tolerated    ID:  Zosyn and azithromycin for pneumonia and possible UTI  F/u blood and urine cultures  COVID/influenza negative   Obtain sputum cultures if produces  MRSA swab + for MSSA in nares --> Bactroban   Trend WBC/fevers/procalcitonin     Heme:  Lovenox for DVT prophylaxis     Endo:  Goal blood glucose <180. Synthroid dose increased     DNR/DNI    CRITICAL CARE TIME SPENT: 35 minutes assessing presenting problems of acute illness, which pose high probability of life threatening deterioration or end organ damage/dysfunction, as well as medical decision making including initiating plan of care, reviewing data, reviewing radiologic exams, discussing with multidisciplinary team,  discussing goals of care with patient/family, and writing this note.  Non-inclusive of procedures performed  Date of entry of this note is equal to the date of services rendered

## 2024-12-11 NOTE — SWALLOW BEDSIDE ASSESSMENT ADULT - ASPIRATION PRECAUTIONS
pt is not able to protect her airway on a voluntary basis, so feed carefully.. She is UNABLE TO FOLLOW COMMANDS SUCH AS, "COUGH", SWALLOW, OPEN YOUR MOUTH". This is because she is severely cognitively impaired./yes

## 2024-12-11 NOTE — PROGRESS NOTE ADULT - SUBJECTIVE AND OBJECTIVE BOX
Date of entry of this note is equal to the date of services rendered   Patient is a 84y old  Female who presents with a chief complaint of Septic shock (11 Dec 2024 12:51)    BRIEF HOSPITAL COURSE:   Ms. Aguilera is a 85 YO F w/pmhx of Alzheimer's disease, hypothyroidism, DM2, MSSA bacteremia, diverticulitis s/p sigmoidectomy, HLD presents to ED w/fever and cough. Patient found to have pneumonia. UA negative. Received sepsis fluid bolus and additional fluid but remains hypotensive. Patient is requiring Levophed. Lactate trended up to 3.0. Mild GAYLA based on previous labs. Received Azithromycin and Zosyn.  at bedside. DNR/DNI     Events last 24 hours:   - Remains on levophed   - Lactate and renal function improved     Review of Systems:  Unable to assess     PAST MEDICAL & SURGICAL HISTORY:  Diverticulosis  H/O: hypothyroidism  Hypercholesterolemia  Fibromyalgia  Acute type 2 diabetes mellitus with manifestations  boderline- diet controlled  Ankle fracture  s/p right- cast  Alzheimer disease  Hypothyroidism  Frequent UTI  History of cholecystectomy  History of tonsillectomy  Diverticulitis of sigmoid colon  s/p sigmoidectomy  S/P cataract surgery, left    No significant past surgical history    Medications:  azithromycin  IVPB 500 milliGRAM(s) IV Intermittent every 24 hours  piperacillin/tazobactam IVPB.. 3.375 Gram(s) IV Intermittent every 8 hours  midodrine 10 milliGRAM(s) Oral every 8 hours  norepinephrine Infusion 0.05 MICROgram(s)/kG/Min IV Continuous <Continuous>  acetaminophen     Tablet .. 650 milliGRAM(s) Oral every 6 hours PRN  enoxaparin Injectable 30 milliGRAM(s) SubCutaneous every 24 hours  pantoprazole    Tablet 40 milliGRAM(s) Oral before breakfast  levothyroxine 75 MICROGram(s) Oral daily  lactated ringers. 1000 milliLiter(s) IV Continuous <Continuous>  potassium phosphate / sodium phosphate Powder (PHOS-NaK) 2 Packet(s) Oral two times a day  influenza  Vaccine (HIGH DOSE) 0.5 milliLiter(s) IntraMuscular once  chlorhexidine 4% Liquid 1 Application(s) Topical daily  mupirocin 2% Nasal 1 Application(s) Both Nostrils two times a day    ICU Vital Signs Last 24 Hrs  T(C): 37.3 (11 Dec 2024 20:00), Max: 38.5 (11 Dec 2024 18:00)  T(F): 99.2 (11 Dec 2024 20:00), Max: 101.3 (11 Dec 2024 18:00)  HR: 70 (11 Dec 2024 20:00) (67 - 83)  BP: 87/52 (11 Dec 2024 20:00) (75/41 - 119/53)  BP(mean): 63 (11 Dec 2024 20:00) (50 - 96)  RR: 22 (11 Dec 2024 20:00) (11 - 25)  SpO2: 100% (11 Dec 2024 20:00) (93% - 100%)  O2 Parameters below as of 11 Dec 2024 08:00  Patient On (Oxygen Delivery Method): room air    I&O's Detail    10 Dec 2024 07:01  -  11 Dec 2024 07:00  --------------------------------------------------------  IN:    IV PiggyBack: 100 mL    Lactated Ringers: 522 mL    Norepinephrine: 58 mL  Total IN: 680 mL    OUT:    Incontinent per Collection Bag (mL): 150 mL  Total OUT: 150 mL    Total NET: 530 mL    11 Dec 2024 07:01  -  11 Dec 2024 21:12  --------------------------------------------------------  IN:    Lactated Ringers: 859 mL    Norepinephrine: 42 mL  Total IN: 901 mL    OUT:  Total OUT: 0 mL    Total NET: 901 mL    LABS:                        10.5   9.94  )-----------( 178      ( 11 Dec 2024 05:49 )             33.4     12-11    139  |  112[H]  |  16  ----------------------------<  122[H]  3.8   |  24  |  0.89    Ca    8.4[L]      11 Dec 2024 05:49  Phos  1.7     12-11  Mg     1.8     12-11    TPro  6.2  /  Alb  2.7[L]  /  TBili  1.6[H]  /  DBili  x   /  AST  16  /  ALT  17  /  AlkPhos  74  12-11    PT/INR - ( 10 Dec 2024 16:30 )   PT: 12.5 sec;   INR: 1.06 ratio    PTT - ( 10 Dec 2024 16:30 )  PTT:28.1 sec    Urinalysis Basic - ( 11 Dec 2024 05:49 )  Color: x / Appearance: x / SG: x / pH: x  Gluc: 122 mg/dL / Ketone: x  / Bili: x / Urobili: x   Blood: x / Protein: x / Nitrite: x   Leuk Esterase: x / RBC: x / WBC x   Sq Epi: x / Non Sq Epi: x / Bacteria: x    CULTURES:  Culture Results:   No growth at 24 hours (12-10 @ 16:30)  Culture Results:   No growth at 24 hours (12-10 @ 16:30)    Physical Examination:  General: No acute distress  HEENT: Pupils equal, reactive to light.  Symmetric.  PULM: Clear to auscultation bilaterally, no significant sputum production  NECK: Supple, no lymphadenopathy, trachea midline  CVS: Regular rate and rhythm, no murmurs, rubs, or gallops  ABD: Soft, nondistended, nontender  EXT: No edema, nontender  SKIN: Warm and well perfused  RADIOLOGY:   < from: Xray Chest 1 View-PORTABLE IMMEDIATE (12.10.24 @ 17:29) >    ACC: [id number] EXAM:  XR CHEST PORTABLE IMMED 1V   ORDERED BY: KARAN YIP     PROCEDURE DATE:  12/10/2024      INTERPRETATION:  CLINICAL INFORMATION: Sepsis.    TECHNIQUE: Single portable view of the chest.    COMPARISON: Chest x-ray 10/2/2024.    FINDINGS:    Patchy left mid and lower lung opacities.  No pneumothorax or large pleural effusion.  Heart size within normal limits.    IMPRESSION:    Patchy left mid-lower lung opacities compatible with pneumonia in the   appropriate clinical setting.    --- End of Report ---    RUTH HAND MD; Attending Radiologist  This document has been electronically signed. Dec 10 2024  8:02PM    < end of copied text >

## 2024-12-12 NOTE — DIETITIAN NUTRITION RISK NOTIFICATION - UPON NUTRITIONAL ASSESSMENT BY THE REGISTERED DIETITIAN YOUR PATIENT WAS DETERMINED TO MEET CRITERIA/HAS EVIDENCE OF THE FOLLOWING DIAGNOSIS:
Hospitalist Progress Note      PCP: Parminder Banda MD    Date of Admission: 8/8/2024    LOS: 2    Chief Complaint:   Chief Complaint   Patient presents with    Chest Pain     Pt via EMS from home, c/o sudden onset chest pain that moves to the back, hurts to move, pt with episode of emesis on arrival        Case Summary:   72-year-old lady with history of hypertension, hyperlipidemia, type 2 diabetes, dementia who presented with epigastric abdominal pain radiating to the back and associated nausea and vomiting found to have CBD dilation with concern for choledocholithiasis and possible gastritis and associated transaminitis.    CT abdomen pelvis noted for distended stomach with antral gastritis  Ultrasound abdomen with cholelithiasis and dilated CBD  Transaminitis trended.  Decision was made for ERCP 8/9/2024 with GI and was found to have choledocholithiasis with obstruction status post biliary sphincterotomy and stone extraction performed.  She was consulted by general surgery and underwent laparoscopic cholecystectomy with intraoperative cholangiogram 8/10/2024      Active Hospital Problems    Diagnosis Date Noted    Hypomagnesemia [E83.42] 08/09/2024    Leukocytosis [D72.829] 08/09/2024    Calculus of bile duct without cholecystitis with obstruction [K80.51] 08/09/2024    Alzheimer's disease, unspecified [G30.9] 01/24/2022    Type 2 diabetes mellitus with diabetic mononeuropathy, with long-term current use of insulin (HCC) [E11.41, Z79.4] 08/25/2020    Essential hypertension [I10] 05/30/2016    Hypercholesteremia [E78.00] 02/01/2016         Principal Problem:    Choledocholithiasis with CBD obstruction: Status post ERCP with sphincterotomy and stone extraction yesterday.  This morning, patient status post cholecystectomy with general surgery.  She is otherwise doing well seen postsurgery.  Adequate pain control.  - Remains on antibiotics  - Continue pain management  - Advance diet as per surgery.  - If  Severe protein-calorie malnutrition

## 2024-12-12 NOTE — DIETITIAN INITIAL EVALUATION ADULT - OTHER INFO
83 YO F w/pmhx of Alzheimer's disease, hypothyroidism, DM2, MSSA bacteremia, diverticulitis s/p sigmoidectomy, HLD presents to ED w/fever and cough. Patient found to have pneumonia. UA negative. Received sepsis fluid bolus and additional fluid but remains hypotensive. Patient is requiring Levophed. Lactate trended up to 3.0. Mild GAYLA based on previous labs. Received Azithromycin and Zosyn.  at bedside. DNR/DNI   Admitted for septic shock 2/2 pna vs UTI, GAYLA. SLP eval 12/11 - pureed, mod thick liquids.     Pt appears very thin, frail, bony. NFPE reveals severe muscle/ fat wasting; meets criteria for PCM. Has met criteria for PCM on prev admits. Unable to provide diet/wt hx 2/2 dementia. Wt on prev RD assessment 91# (9/25/24). New bed scale wt obtained by RD on 12/11 at 89#. Will add ensure shakes TID. Maintain aspiration precautions, back of bed >45 degrees. Strongly suggest to Confirm goals of care regarding nutrition support - Nutrition support is not recommended due to overall declining medical status which evidenced based studies indicate EN is not effective in prolonging survival and improving quality of life. It can also increase risk of aspiration pneumonia as well as other related issues (infection, GI complications, and worsening/ non-healing PI's). However, will provide nutrition/ hydration within GOC. See additional recs below.

## 2024-12-12 NOTE — DIETITIAN INITIAL EVALUATION ADULT - PERTINENT LABORATORY DATA
12-12    141  |  113[H]  |  16  ----------------------------<  124[H]  3.9   |  25  |  1.04    Ca    8.5      12 Dec 2024 06:04  Phos  2.3     12-12  Mg     1.6     12-12    TPro  6.2  /  Alb  2.7[L]  /  TBili  1.6[H]  /  DBili  x   /  AST  16  /  ALT  17  /  AlkPhos  74  12-11  A1C with Estimated Average Glucose Result: 5.4 % (09-25-24 @ 10:01)

## 2024-12-12 NOTE — DIETITIAN INITIAL EVALUATION ADULT - NUTRITIONGOAL OUTCOME1
Will provide nutrition/ hydration within goals of care   Pt will consume >/= 80% of all meals and supplements

## 2024-12-12 NOTE — PROGRESS NOTE ADULT - ASSESSMENT
85 y/o female PMH Alzheimer's dementia, hypothyroid, presented with fever, cough, found to have abnormal UA and hypotension, admitted to ICU for septic shock.       Problem list:   Severe sepsis with septic shock (POA) due to acute cystitis and L sided pneumonia, gram negative cannot be ruled out   Uncontrolled hypothyroidism, suspect adrenal insufficiency       Plan:   -remains on low dose Levo, wean for SBP>90  -add stress steroid   -increase Synthroid dose, change to iv   -continue Zosyn, azithro, UCx growing E. coli, Kleb, sens pending, BCx NGTD   -po diet as tolerated   -monitor renal fn, trend and replete lytes prn. No Aguayo  -DVT ppx with Lovenox   -prognosis poor, DNR, DNI with trial of NIV       Patient critically ill on vasoactive med

## 2024-12-12 NOTE — DIETITIAN INITIAL EVALUATION ADULT - NSFNSGIIOFT_GEN_A_CORE
I&O's Detail    11 Dec 2024 07:01  -  12 Dec 2024 07:00  --------------------------------------------------------  IN:    Lactated Ringers: 1350 mL    Norepinephrine: 99.5 mL  Total IN: 1449.5 mL    OUT:    Incontinent per Collection Bag (mL): 1100 mL  Total OUT: 1100 mL    Total NET: 349.5 mL      12 Dec 2024 07:01  -  12 Dec 2024 09:20  --------------------------------------------------------  IN:    Lactated Ringers: 28.1 mL    Norepinephrine: 2.6 mL  Total IN: 30.7 mL    OUT:  Total OUT: 0 mL    Total NET: 30.7 mL

## 2024-12-12 NOTE — PROGRESS NOTE ADULT - SUBJECTIVE AND OBJECTIVE BOX
Patient is a 84y old  Female who presents with a chief complaint of Urinary tract infection     (12 Dec 2024 09:11)    Allergies    Compazine (Other)  Compazine (Unknown)    Intolerances      REVIEW OF SYSTEMS: SEE BELOW       ICU Vital Signs Last 24 Hrs  T(C): 37.6 (12 Dec 2024 08:00), Max: 38.5 (11 Dec 2024 18:00)  T(F): 99.6 (12 Dec 2024 08:00), Max: 101.3 (11 Dec 2024 18:00)  HR: 62 (12 Dec 2024 09:00) (56 - 81)  BP: 104/48 (12 Dec 2024 09:00) (77/63 - 121/94)  BP(mean): 65 (12 Dec 2024 09:00) (55 - 108)  ABP: --  ABP(mean): --  RR: 24 (12 Dec 2024 09:00) (13 - 25)  SpO2: 96% (12 Dec 2024 09:00) (94% - 100%)    O2 Parameters below as of 12 Dec 2024 09:00  Patient On (Oxygen Delivery Method): room air            CAPILLARY BLOOD GLUCOSE          I&O's Summary    11 Dec 2024 07:01  -  12 Dec 2024 07:00  --------------------------------------------------------  IN: 1449.5 mL / OUT: 1100 mL / NET: 349.5 mL    12 Dec 2024 07:01  -  12 Dec 2024 10:17  --------------------------------------------------------  IN: 30.7 mL / OUT: 0 mL / NET: 30.7 mL            MEDICATIONS  (STANDING):  azithromycin  IVPB 500 milliGRAM(s) IV Intermittent every 24 hours  chlorhexidine 4% Liquid 1 Application(s) Topical daily  enoxaparin Injectable 30 milliGRAM(s) SubCutaneous every 24 hours  influenza  Vaccine (HIGH DOSE) 0.5 milliLiter(s) IntraMuscular once  lactated ringers. 1000 milliLiter(s) (50 mL/Hr) IV Continuous <Continuous>  levothyroxine 75 MICROGram(s) Oral daily  midodrine 10 milliGRAM(s) Oral every 8 hours  mupirocin 2% Nasal 1 Application(s) Both Nostrils two times a day  norepinephrine Infusion 0.05 MICROgram(s)/kG/Min (4.25 mL/Hr) IV Continuous <Continuous>  pantoprazole    Tablet 40 milliGRAM(s) Oral before breakfast  piperacillin/tazobactam IVPB.. 3.375 Gram(s) IV Intermittent every 8 hours  potassium phosphate / sodium phosphate Powder (PHOS-NaK) 2 Packet(s) Oral two times a day      MEDICATIONS  (PRN):  acetaminophen     Tablet .. 650 milliGRAM(s) Oral every 6 hours PRN Temp greater or equal to 38C (100.4F)      PHYSICAL EXAM: SEE BELOW                          9.9    10.66 )-----------( 205      ( 12 Dec 2024 06:04 )             30.2       12-12    141  |  113[H]  |  16  ----------------------------<  124[H]  3.9   |  25  |  1.04    Ca    8.5      12 Dec 2024 06:04  Phos  2.3     12-12  Mg     1.6     12-12    TPro  6.2  /  Alb  2.7[L]  /  TBili  1.6[H]  /  DBili  x   /  AST  16  /  ALT  17  /  AlkPhos  74  12-11          PT/INR - ( 10 Dec 2024 16:30 )   PT: 12.5 sec;   INR: 1.06 ratio         PTT - ( 10 Dec 2024 16:30 )  PTT:28.1 sec  Urinalysis Basic - ( 12 Dec 2024 06:04 )    Color: x / Appearance: x / SG: x / pH: x  Gluc: 124 mg/dL / Ketone: x  / Bili: x / Urobili: x   Blood: x / Protein: x / Nitrite: x   Leuk Esterase: x / RBC: x / WBC x   Sq Epi: x / Non Sq Epi: x / Bacteria: x      Clean Catch None   >100,000 CFU/ml Escherichia coli -- 12-10 @ 16:50  .Blood BLOOD   No growth at 24 hours -- 12-10 @ 16:30           negative

## 2024-12-12 NOTE — DIETITIAN INITIAL EVALUATION ADULT - PERTINENT MEDS FT
MEDICATIONS  (STANDING):  azithromycin  IVPB 500 milliGRAM(s) IV Intermittent every 24 hours  chlorhexidine 4% Liquid 1 Application(s) Topical daily  enoxaparin Injectable 30 milliGRAM(s) SubCutaneous every 24 hours  influenza  Vaccine (HIGH DOSE) 0.5 milliLiter(s) IntraMuscular once  lactated ringers. 1000 milliLiter(s) (50 mL/Hr) IV Continuous <Continuous>  levothyroxine 75 MICROGram(s) Oral daily  midodrine 10 milliGRAM(s) Oral every 8 hours  mupirocin 2% Nasal 1 Application(s) Both Nostrils two times a day  norepinephrine Infusion 0.05 MICROgram(s)/kG/Min (4.25 mL/Hr) IV Continuous <Continuous>  pantoprazole    Tablet 40 milliGRAM(s) Oral before breakfast  piperacillin/tazobactam IVPB.. 3.375 Gram(s) IV Intermittent every 8 hours  potassium phosphate / sodium phosphate Powder (PHOS-NaK) 2 Packet(s) Oral two times a day    MEDICATIONS  (PRN):  acetaminophen     Tablet .. 650 milliGRAM(s) Oral every 6 hours PRN Temp greater or equal to 38C (100.4F)

## 2024-12-12 NOTE — DIETITIAN INITIAL EVALUATION ADULT - ADD RECOMMEND
1) c/w diet rx per SLP recs; Maintain aspiration precautions, back of bed >45 degrees, 2) Add ensure plus high protein TID to optimize PO intake (provides 350 kcal, 20g protein/ shake), 3) Encourage protein-rich foods, maximize food preferences, 4) MVI w/ minerals daily to ensure 100% RDA met, 5) Consider adding thiamine 100 mg daily 2/2 poor PO intake/ malnutrition, 6) Consider adding appetite stimulant such as Remeron or Marinol 2/2 chronically poor appetite/ PO intake, 7) Consider obtaining vitamin D 25OH level to assess nutriture, 8) consider checking B6, B12, thiamine, folate, carnitine, and copper levels as malnutrition in cause these to be deficient, 9) Confirm goals of care regarding nutrition support - Nutrition support is not recommended due to overall declining medical status which evidenced based studies indicate EN is not effective in prolonging survival and improving quality of life. It can also increase risk of aspiration pneumonia as well as other related issues (infection, GI complications, and worsening/ non-healing PI's). However, will provide nutrition/ hydration within GOC. RD will continue to monitor PO intake, labs, hydration, and wt prn.

## 2024-12-13 NOTE — PROGRESS NOTE ADULT - ASSESSMENT
85 y/o female PMH Alzheimer's dementia, hypothyroid, presented with fever, cough, found to have abnormal UA and hypotension, admitted to ICU for septic shock.       Problem list:   Severe sepsis with septic shock (POA) due to acute cystitis and possible L sided pneumonia,      hypothyroidism,     sinus bradycardia    dementia    Plan:       titrate off levophed   d/c midodrine given sinus bradycardia  -hypothyroidism synthroid dose was increased   , UCx growing E. coli, Kleb, sens pending, BCx NGTD  rocephin  -po diet as tolerated   -monitor renal fn, creatinie o.9 trend and replete lytes prn. No Aguayo  -DVT ppx with Lovenox   , DNR, DNI with trial of NIV   on stress steroids titrate off when off pressors      Patient critically ill on vasoactive med

## 2024-12-13 NOTE — PROGRESS NOTE ADULT - SUBJECTIVE AND OBJECTIVE BOX
Interval History:       Patient admitted with fever and cough       cxr ? faint infiltrate       urine culture with E. coli and Klebsiella       Patient confused cant get ROS       Sinus bradycardia on monitor       pressors being titrated off    HPI:       Ms. Aguilera is a 83 YO F w/pmhx of Alzheimer's disease, hypothyroidism, DM2, MSSA bacteremia, diverticulitis s/p sigmoidectomy, HLD presents to ED w/fever and cough. Patient found to have ? pneumonia.  Received sepsis fluid bolus and additional fluid but remains hypotensive. Patient was on requiring Levophed. Lactate trended up to 3.0. Mild GAYLA based on previous labs. DNR/DNI     Review of Systems:  Unable to assess     MEDICATIONS  (STANDING):  cefTRIAXone Injectable. 1000 milliGRAM(s) IV Push every 24 hours  chlorhexidine 4% Liquid 1 Application(s) Topical daily  enoxaparin Injectable 30 milliGRAM(s) SubCutaneous every 24 hours  hydrocortisone sodium succinate Injectable 50 milliGRAM(s) IV Push every 8 hours  influenza  Vaccine (HIGH DOSE) 0.5 milliLiter(s) IntraMuscular once  lactated ringers. 1000 milliLiter(s) (50 mL/Hr) IV Continuous <Continuous>  levothyroxine 75 MICROGram(s) Oral daily  mupirocin 2% Nasal 1 Application(s) Both Nostrils two times a day  pantoprazole    Tablet 40 milliGRAM(s) Oral before breakfast    MEDICATIONS  (PRN):  acetaminophen     Tablet .. 650 milliGRAM(s) Oral every 6 hours PRN Temp greater or equal to 38C (100.4F)    ICU Vital Signs Last 24 Hrs  T(C): 36.1 (13 Dec 2024 10:00), Max: 38.3 (12 Dec 2024 16:00)  T(F): 97 (13 Dec 2024 10:00), Max: 100.9 (12 Dec 2024 16:00)  HR: 57 (13 Dec 2024 12:00) (44 - 77)  BP: 108/51 (13 Dec 2024 12:00) (82/65 - 118/63)  BP(mean): 68 (13 Dec 2024 12:00) (60 - 94)  ABP: --  ABP(mean): --  RR: 14 (13 Dec 2024 12:00) (14 - 28)  SpO2: 93% (13 Dec 2024 12:00) (91% - 97%)    O2 Parameters below as of 13 Dec 2024 12:00  Patient On (Oxygen Delivery Method): room air    Physical Exam                  I&O's Summary    12 Dec 2024 07:01  -  13 Dec 2024 07:00  --------------------------------------------------------  IN: 1584.6 mL / OUT: 1900 mL / NET: -315.4 mL                                       10.1   10.76 )-----------( 205      ( 13 Dec 2024 06:13 )             31.3       12-13    141  |  113[H]  |  16  ----------------------------<  147[H]  3.8   |  23  |  0.98    Ca    8.6      13 Dec 2024 06:13  Phos  3.6     12-13  Mg     2.4     12-13                  Urinalysis Basic - ( 13 Dec 2024 06:13 )    Color: x / Appearance: x / SG: x / pH: x  Gluc: 147 mg/dL / Ketone: x  / Bili: x / Urobili: x   Blood: x / Protein: x / Nitrite: x   Leuk Esterase: x / RBC: x / WBC x   Sq Epi: x / Non Sq Epi: x / Bacteria: x        I personally reviewed the CXR:    DVT Prophylaxis:                                                                 Advanced Directives:         Labs, Notes, Orders, radiologic studies reviewed and care coordinated with multidisciplinary team

## 2024-12-14 NOTE — PROGRESS NOTE ADULT - SUBJECTIVE AND OBJECTIVE BOX
HPI:  Ms. Aguilera is a 83 YO F w/pmhx of Alzheimer's disease, hypothyroidism, DM2, MSSA bacteremia, diverticulitis s/p sigmoidectomy, HLD presents to ED w/fever and cough. Patient found to have pneumonia. UA negative. Received sepsis fluid bolus and additional fluid but remains hypotensive. Patient is requiring Levophed. Lactate trended up to 3.0. Mild GAYLA based on previous labs. Received Azithromycin and Zosyn.  at bedside. DNR/DNI      12/11: Patient remains on pressors  12/14: Patient now on HF NC O2, CXR showing increased PVC and BnP is elevated and likely increasing infiltrates       PAST MEDICAL & SURGICAL HISTORY:  Diverticulosis      H/O: hypothyroidism      Hypercholesterolemia      Fibromyalgia      Acute type 2 diabetes mellitus with manifestations  boderline- diet controlled      Ankle fracture  s/p right- cast      Alzheimer disease      Hypothyroidism      Frequent UTI      History of cholecystectomy      History of tonsillectomy      Diverticulitis of sigmoid colon  s/p sigmoidectomy      S/P cataract surgery, left      No significant past surgical history          FAMILY HISTORY:  No pertinent family history in first degree relatives        Social Hx:    Allergies    Compazine (Other)  Compazine (Unknown)    Intolerances            ICU Vital Signs Last 24 Hrs  T(C): 37.4 (14 Dec 2024 10:00), Max: 37.4 (14 Dec 2024 10:00)  T(F): 99.3 (14 Dec 2024 10:00), Max: 99.3 (14 Dec 2024 10:00)  HR: 87 (14 Dec 2024 10:00) (55 - 89)  BP: 130/60 (14 Dec 2024 10:00) (90/50 - 131/58)  BP(mean): 81 (14 Dec 2024 10:00) (59 - 95)  ABP: --  ABP(mean): --  RR: 34 (14 Dec 2024 09:08) (18 - 37)  SpO2: 93% (14 Dec 2024 09:08) (85% - 100%)    O2 Parameters below as of 14 Dec 2024 09:08  Patient On (Oxygen Delivery Method): nasal cannula, high flow  O2 Flow (L/min): 40  O2 Concentration (%): 50            I&O's Summary    13 Dec 2024 07:01  -  14 Dec 2024 07:00  --------------------------------------------------------  IN: 625 mL / OUT: 400 mL / NET: 225 mL                              9.7    14.29 )-----------( 257      ( 14 Dec 2024 06:47 )             30.7       12-14    145  |  115[H]  |  21  ----------------------------<  137[H]  4.9   |  27  |  0.96    Ca    9.1      14 Dec 2024 06:47  Phos  3.0     12-14  Mg     2.0     12-14                  Urinalysis Basic - ( 14 Dec 2024 06:47 )    Color: x / Appearance: x / SG: x / pH: x  Gluc: 137 mg/dL / Ketone: x  / Bili: x / Urobili: x   Blood: x / Protein: x / Nitrite: x   Leuk Esterase: x / RBC: x / WBC x   Sq Epi: x / Non Sq Epi: x / Bacteria: x        MEDICATIONS  (STANDING):  chlorhexidine 4% Liquid 1 Application(s) Topical daily  enoxaparin Injectable 30 milliGRAM(s) SubCutaneous every 24 hours  hydrocortisone sodium succinate Injectable 50 milliGRAM(s) IV Push every 8 hours  influenza  Vaccine (HIGH DOSE) 0.5 milliLiter(s) IntraMuscular once  levothyroxine 75 MICROGram(s) Oral daily  mupirocin 2% Nasal 1 Application(s) Both Nostrils two times a day  pantoprazole    Tablet 40 milliGRAM(s) Oral before breakfast  phenylephrine    Infusion 0.1 MICROgram(s)/kG/Min (1.7 mL/Hr) IV Continuous <Continuous>  piperacillin/tazobactam IVPB.- 3.375 Gram(s) IV Intermittent once  piperacillin/tazobactam IVPB.. 3.375 Gram(s) IV Intermittent every 8 hours    MEDICATIONS  (PRN):  acetaminophen     Tablet .. 650 milliGRAM(s) Oral every 6 hours PRN Temp greater or equal to 38C (100.4F)      DVT Prophylaxis:    Advanced Directives:  Discussed with:    Visit Information: 30 min    ** Time is exclusive of billed procedures and/or teaching and/or routine family updates.

## 2024-12-14 NOTE — PROGRESS NOTE ADULT - ASSESSMENT
A/P: 84 female P/W Septic shock from a UTI  Dementia  Hypothyroidism    Plan:  ICU    PULM: Now fluid overloaded, wean HF NC O2, Diuresis, Likely aspirating as well    Cardio: Was Hypotensive from septic shock.     Renal: Replace lytes as needed    GI: PO Diet    ENDO: Synthroid increased to 75 from 50    ID: Urine with E. Coli and kleb both sensitive o Zosyn    Lovenox DVT Prophylaxis

## 2024-12-15 NOTE — PROGRESS NOTE ADULT - SUBJECTIVE AND OBJECTIVE BOX
Date of entry of this note is equal to the date of services rendered   Patient is a 84y old  Female who presents with a chief complaint of Septic shock (15 Dec 2024 12:34)    BRIEF HOSPITAL COURSE:   Ms. Aguilera is a 85 YO F w/pmhx of Alzheimer's disease, hypothyroidism, DM2, MSSA bacteremia, diverticulitis s/p sigmoidectomy, HLD presents to ED w/fever and cough. Patient found to have pneumonia. UA negative. Received sepsis fluid bolus and additional fluid but remains hypotensive. Patient is requiring Levophed. Lactate trended up to 3.0. Mild GAYLA based on previous labs. Received Azithromycin and Zosyn.  at bedside. DNR/DNI     Events last 24 hours:   - Remains tachypneic   - On HFNC   - Able to wean off Abebe     Review of Systems:  Unable to assess given clinical condition     PAST MEDICAL & SURGICAL HISTORY:  Diverticulosis  H/O: hypothyroidism  Hypercholesterolemia  Fibromyalgia  Acute type 2 diabetes mellitus with manifestations  boderline- diet controlled  Ankle fracture  s/p right- cast  Alzheimer disease  Hypothyroidism  Frequent UTI  History of cholecystectomy  History of tonsillectomy  Diverticulitis of sigmoid colon  s/p sigmoidectomy  S/P cataract surgery, left  No significant past surgical history    Medications:  piperacillin/tazobactam IVPB.. 3.375 Gram(s) IV Intermittent every 8 hours  phenylephrine    Infusion 0.1 MICROgram(s)/kG/Min IV Continuous <Continuous>  enoxaparin Injectable 30 milliGRAM(s) SubCutaneous every 24 hours  pantoprazole    Tablet 40 milliGRAM(s) Oral before breakfast  hydrocortisone sodium succinate Injectable 50 milliGRAM(s) IV Push every 8 hours  levothyroxine 75 MICROGram(s) Oral daily  influenza  Vaccine (HIGH DOSE) 0.5 milliLiter(s) IntraMuscular once  chlorhexidine 4% Liquid 1 Application(s) Topical daily  mupirocin 2% Nasal 1 Application(s) Both Nostrils two times a day    ICU Vital Signs Last 24 Hrs  T(C): 37.5 (15 Dec 2024 16:00), Max: 38.2 (15 Dec 2024 03:00)  T(F): 99.5 (15 Dec 2024 16:00), Max: 100.7 (15 Dec 2024 03:00)  HR: 72 (16 Dec 2024 01:00) (64 - 83)  BP: 104/56 (16 Dec 2024 01:00) (85/41 - 120/62)  BP(mean): 68 (16 Dec 2024 01:00) (55 - 81)  RR: 41 (16 Dec 2024 01:00) (13 - 41)  SpO2: 93% (16 Dec 2024 01:00) (87% - 98%)  O2 Parameters below as of 16 Dec 2024 00:15  Patient On (Oxygen Delivery Method): nasal cannula, high flow  O2 Flow (L/min): 40  O2 Concentration (%): 65    I&O's Detail    14 Dec 2024 07:01  -  15 Dec 2024 07:00  --------------------------------------------------------  IN:    IV PiggyBack: 100 mL  Total IN: 100 mL    OUT:    Incontinent per Collection Bag (mL): 1450 mL  Total OUT: 1450 mL    Total NET: -1350 mL    15 Dec 2024 07:01  -  16 Dec 2024 01:39  --------------------------------------------------------  IN:    IV PiggyBack: 125 mL  Total IN: 125 mL    OUT:    Voided (mL): 100 mL  Total OUT: 100 mL    Total NET: 25 mL    LABS:                        8.9    13.39 )-----------( 235      ( 15 Dec 2024 05:56 )             27.1     12-15    140  |  106  |  25[H]  ----------------------------<  160[H]  3.2[L]   |  29  |  1.31[H]    Ca    8.3[L]      15 Dec 2024 05:56  Phos  3.8     12-15  Mg     2.1     12-15    Urinalysis Basic - ( 15 Dec 2024 05:56 )  Color: x / Appearance: x / SG: x / pH: x  Gluc: 160 mg/dL / Ketone: x  / Bili: x / Urobili: x   Blood: x / Protein: x / Nitrite: x   Leuk Esterase: x / RBC: x / WBC x   Sq Epi: x / Non Sq Epi: x / Bacteria: x    CULTURES:  Culture Results:   >100,000 CFU/ml Escherichia coli  >100,000 CFU/ml Klebsiella variicola (12-10 @ 16:50)  Culture Results:   No growth at 5 days (12-10 @ 16:30)  Culture Results:   No growth at 5 days (12-10 @ 16:30)    Physical Examination:  General: Mild respiratory distress   HEENT: Pupils equal, reactive to light.  Symmetric.  PULM: Diminished breath sounds b/l   NECK: Supple, no lymphadenopathy, trachea midline  CVS: Regular rate and rhythm, no murmurs, rubs, or gallops  ABD: Soft, nondistended, nontender  EXT: No edema, nontender  SKIN: Warm and well perfused  RADIOLOGY:   < from: Xray Chest 1 View-PORTABLE IMMEDIATE (12.10.24 @ 17:29) >    ACC: [id number] EXAM:  XR CHEST PORTABLE IMMED 1V   ORDERED BY: KARAN YIP     PROCEDURE DATE:  12/10/2024      INTERPRETATION:  CLINICAL INFORMATION: Sepsis.    TECHNIQUE: Single portable view of the chest.    COMPARISON: Chest x-ray 10/2/2024.    FINDINGS:    Patchy left mid and lower lung opacities.  No pneumothorax or large pleural effusion.  Heart size within normal limits.    IMPRESSION:    Patchy left mid-lower lung opacities compatible with pneumonia in the   appropriate clinical setting.    --- End of Report ---    RUTH HAND MD; Attending Radiologist  This document has been electronically signed. Dec 10 2024  8:02PM    < end of copied text >

## 2024-12-15 NOTE — PROGRESS NOTE ADULT - ASSESSMENT
A/P: 84 female P/W Septic shock from a UTI  Dementia  Hypothyroidism    Plan:  ICU    PULM: Despite diuresis she is on HF NC O2 Fi O2 60%    Cardio: Hypotensive from septic shock, wean bri    Renal: Replace lytes as needed    GI: PO Diet, While she is likely aspirating she will not get a PEG and the family agrees to let her continue eating since it is one of the few pleasures she has lleft    ENDO: Synthroid increased to 75 from 50    ID: Urine with E. Coli and kleb both sensitive to Zosyn    Lovenox DVT Prophylaxis    D/W The patient Son and Daughter again today her current condition.  If the patient worsens we will likely transition to Comfort Care

## 2024-12-15 NOTE — PROGRESS NOTE ADULT - SUBJECTIVE AND OBJECTIVE BOX
HPI:  Ms. Aguilera is a 83 YO F w/pmhx of Alzheimer's disease, hypothyroidism, DM2, MSSA bacteremia, diverticulitis s/p sigmoidectomy, HLD presents to ED w/fever and cough. Patient found to have pneumonia. UA negative. Received sepsis fluid bolus and additional fluid but remains hypotensive. Patient is requiring Levophed. Lactate trended up to 3.0. Mild GAYLA based on previous labs. Received Azithromycin and Zosyn.  at bedside. DNR/DNI      12/11: Patient remains on pressors  12/14: Patient now on HF NC O2, CXR showing increased PVC and BnP is elevated and likely increasing infiltrates  12/15: She remains on HF NC O2 and Pressors, MS montserrat       PAST MEDICAL & SURGICAL HISTORY:  Diverticulosis      H/O: hypothyroidism      Hypercholesterolemia      Fibromyalgia      Acute type 2 diabetes mellitus with manifestations  boderline- diet controlled      Ankle fracture  s/p right- cast      Alzheimer disease      Hypothyroidism      Frequent UTI      History of cholecystectomy      History of tonsillectomy      Diverticulitis of sigmoid colon  s/p sigmoidectomy      S/P cataract surgery, left      No significant past surgical history          FAMILY HISTORY:  No pertinent family history in first degree relatives        Social Hx:    Allergies    Compazine (Other)  Compazine (Unknown)    Intolerances            ICU Vital Signs Last 24 Hrs  T(C): 37.4 (15 Dec 2024 12:00), Max: 38.5 (15 Dec 2024 00:00)  T(F): 99.4 (15 Dec 2024 12:00), Max: 101.3 (15 Dec 2024 00:00)  HR: 68 (15 Dec 2024 12:15) (64 - 86)  BP: 85/47 (15 Dec 2024 12:15) (85/41 - 136/91)  BP(mean): 58 (15 Dec 2024 12:15) (55 - 103)  ABP: --  ABP(mean): --  RR: 32 (15 Dec 2024 12:15) (13 - 37)  SpO2: 95% (15 Dec 2024 12:15) (90% - 100%)    O2 Parameters below as of 15 Dec 2024 12:00  Patient On (Oxygen Delivery Method): nasal cannula, high flow  O2 Flow (L/min): 40  O2 Concentration (%): 60            I&O's Summary    14 Dec 2024 07:01  -  15 Dec 2024 07:00  --------------------------------------------------------  IN: 100 mL / OUT: 1450 mL / NET: -1350 mL                              8.9    13.39 )-----------( 235      ( 15 Dec 2024 05:56 )             27.1       12-15    140  |  106  |  25[H]  ----------------------------<  160[H]  3.2[L]   |  29  |  1.31[H]    Ca    8.3[L]      15 Dec 2024 05:56  Phos  3.8     12-15  Mg     2.1     12-15                  Urinalysis Basic - ( 15 Dec 2024 05:56 )    Color: x / Appearance: x / SG: x / pH: x  Gluc: 160 mg/dL / Ketone: x  / Bili: x / Urobili: x   Blood: x / Protein: x / Nitrite: x   Leuk Esterase: x / RBC: x / WBC x   Sq Epi: x / Non Sq Epi: x / Bacteria: x        MEDICATIONS  (STANDING):  chlorhexidine 4% Liquid 1 Application(s) Topical daily  enoxaparin Injectable 30 milliGRAM(s) SubCutaneous every 24 hours  hydrocortisone sodium succinate Injectable 50 milliGRAM(s) IV Push every 8 hours  influenza  Vaccine (HIGH DOSE) 0.5 milliLiter(s) IntraMuscular once  levothyroxine 75 MICROGram(s) Oral daily  mupirocin 2% Nasal 1 Application(s) Both Nostrils two times a day  pantoprazole    Tablet 40 milliGRAM(s) Oral before breakfast  phenylephrine    Infusion 0.1 MICROgram(s)/kG/Min (1.7 mL/Hr) IV Continuous <Continuous>  piperacillin/tazobactam IVPB.. 3.375 Gram(s) IV Intermittent every 8 hours    MEDICATIONS  (PRN):      DVT Prophylaxis:    Advanced Directives:  Discussed with:    Visit Information: 30 min    ** Time is exclusive of billed procedures and/or teaching and/or routine family updates.

## 2024-12-15 NOTE — PROGRESS NOTE ADULT - ASSESSMENT
Assessment:  Ms. Aguilera is a 83 YO F w/pmhx of Alzheimer's disease, hypothyroidism, DM2, MSSA bacteremia, diverticulitis s/p sigmoidectomy, HLD presents to ED w/fever and cough. Patient found to have pneumonia. UA negative. Received sepsis fluid bolus and additional fluid but remains hypotensive. Patient is requiring Levophed. Lactate trended up to 3.0. Mild GAYLA based on previous labs. Received Azithromycin and Zosyn.  at bedside. DNR/DNI     Problem List:  1. Septic shock 2/2  2. Pneumonia vs  3. Possible UTI (pansensitive in past)  4. GAYLA  5. Lactic acidosis     Plan:  Neuro:  Baseline dementia  Monitor mental status, avoid deliriogenic medications. Pain & fever control as needed    CV:  Shock, likely septic, received 30cc/kg fluid bolus   Off Abebe tonight  F/u cortisol      Pulm:  On 60% Fio2 on HFNC w/tachypnea, respiratory status worsening   Patient is at risk for death from respiratory failure, DNR/DNI  Try and obtain sputum culture    Renal:  GAYLA, improved, continue IVF  Strict I/Os, goal UOP >0.5cc/kg/hr. Trend renal function and electrolytes, replete as needed. Avoid nephrotoxic agents    GI:  PPI  Diet as tolerated    ID:  PNA & UTI  Urine culture w/E coli and Klebsiella   COVID/influenza negative   Obtain sputum cultures if produces  MRSA swab + for MSSA in nares --> Bactroban   Trend WBC/fevers/procalcitonin     Heme:  Lovenox for DVT prophylaxis     Endo:  Goal blood glucose <180. Synthroid     Spoke to son at bedside, they are thinking of withdrawal of care & hospice     DNR/DNI    CRITICAL CARE TIME SPENT: 35 minutes assessing presenting problems of acute illness, which pose high probability of life threatening deterioration or end organ damage/dysfunction, as well as medical decision making including initiating plan of care, reviewing data, reviewing radiologic exams, discussing with multidisciplinary team,  discussing goals of care with patient/family, and writing this note.  Non-inclusive of procedures performed  Date of entry of this note is equal to the date of services rendered

## 2024-12-16 NOTE — PROGRESS NOTE ADULT - ASSESSMENT
A:    84yFemale    Here for:    1. Septic shock 2/2  2. Pneumonia vs  3. Possible UTI (pansensitive in past)  4. GAYLA  5. Lactic acidosis     This patient requires critical care for support of one or more vital organ systems with a high probability of imminent or life threatening deterioration in his/her condition    P:    HFNC 2/2 PNA  Sepsis 2/2 UTI and PNA    Actively titrating HFNC  Abx  As now moving toward comfort measures no pressors  Symptom control for SOB, end of life symptoms  GAYLA, trend  VTE PUX ppx    End of life care    Dispo: Cont critical care.    Date of entry of this note is equal to the date of services rendered    TOTAL CRITICAL CARE TIME: 40 minutes (EXCLUSIVE of any non bundled procedures)    Note: This is a critically ill patient. Time spent has been in salvage of life, limb and vital organ systems. This time INCLUDES time spent directly as this patient's bedside with evaluation and management, review of chart including review of laboratory and imaging studies, interpretation of vital signs and cardiac output measurements, any necessary ventilator management, and time spent discussing plan of care with patient and family, including goals of care discussion. This also includes time spent in multidisciplinary discussion with care team and various consultants to optimize treatment plan. This time may NOT include various procedures, which will be noted seperately.

## 2024-12-16 NOTE — PROGRESS NOTE ADULT - GASTROINTESTINAL
soft/nondistended
soft/nontender/normal active bowel sounds
soft/nondistended

## 2024-12-16 NOTE — CONSULT NOTE ADULT - ASSESSMENT
Process of Care  --Reviewed dx/treatment problems and alignment with Goals of Care    Physical Aspects of Care  --Pain  c/w current management    --Bowel Regimen  risk for constipation d/t immobility  daily dulcolax    --Dyspnea  comfortable and in NAD    --Nausea Vomiting  denies    --Weakness  PT as tolerated     Psychological and Psychiatric Aspects of Care:   --Greif/Bereavment: emotional support provided  --Hx of psychiatric dx: none  -Pastoral Care Available PRN     Social Aspects of Care  -SW involved     Cultural Aspects  -Primary Language: English    Goals of Care:     We discussed Palliative Care team being a supportive team when a patient has ongoing illnesses.  We also discussed that it is not an end of life care service, but can help navigate symptoms and emotional support througout their hospital stay here.    Hospice was explained as well  as an end of life care philosophy.  When a disease cannot be cured, or family/patient decide the treatment burdens out weigh the risk and one choses to change focus of treatment from cure to quality/comfort.       Prognosis: Death can occur at anytime, but if disease continues to progress naturally patient likely has days     Ethical and Legal Aspects:   NA        Capacity: no capacity  HCP/Surrogate: is surrogate  Code Status: dnr dni trial niv  MOLST:dnr dni trial niv   Dispo Plan: pending furtehr discussion w/ surrogates    Discussed With: Case coordinated with attending and SW and RN     Time Spent: 90 minutes including the care, coordination and counseling of this patient, 50% of which was spent coordinating and counseling.

## 2024-12-16 NOTE — PROGRESS NOTE ADULT - RESPIRATORY
clear to auscultation bilaterally/no wheezes/no rales/no rhonchi
clear to auscultation bilaterally/no wheezes/no rales/no rhonchi
clear to auscultation bilaterally/no wheezes/no respiratory distress
clear to auscultation bilaterally/no wheezes/no rales/no rhonchi
clear to auscultation bilaterally/no wheezes/no rales/no rhonchi

## 2024-12-16 NOTE — PROGRESS NOTE ADULT - SUBJECTIVE AND OBJECTIVE BOX
HPI:  Ms. Aguilera is a 85 YO F w/pmhx of Alzheimer's disease, hypothyroidism, DM2, MSSA bacteremia, diverticulitis s/p sigmoidectomy, HLD presents to ED w/fever and cough. Patient found to have pneumonia. UA negative. Received sepsis fluid bolus and additional fluid but remains hypotensive. Patient is requiring Levophed. Lactate trended up to 3.0. Mild GAYLA based on previous labs. Received Azithromycin and Zosyn.  at bedside. DNR/DNI      12/11: Patient remains on pressors  12/14: Patient now on HF NC O2, CXR showing increased PVC and BnP is elevated and likely increasing infiltrates  12/15: She remains on HF NC O2 and Pressors, MS mariano  12/16: Continues to do poorly.  On HF NC O2 60% and pressors           PAST MEDICAL & SURGICAL HISTORY:  Diverticulosis      H/O: hypothyroidism      Hypercholesterolemia      Fibromyalgia      Acute type 2 diabetes mellitus with manifestations  boderline- diet controlled      Ankle fracture  s/p right- cast      Alzheimer disease      Hypothyroidism      Frequent UTI      History of cholecystectomy      History of tonsillectomy      Diverticulitis of sigmoid colon  s/p sigmoidectomy      S/P cataract surgery, left      No significant past surgical history          FAMILY HISTORY:  No pertinent family history in first degree relatives        Social Hx:    Allergies    Compazine (Other)  Compazine (Unknown)    Intolerances            ICU Vital Signs Last 24 Hrs  T(C): 37 (16 Dec 2024 12:00), Max: 38 (15 Dec 2024 14:30)  T(F): 98.6 (16 Dec 2024 12:00), Max: 100.4 (15 Dec 2024 14:30)  HR: 71 (16 Dec 2024 13:00) (60 - 83)  BP: 108/53 (16 Dec 2024 13:00) (92/46 - 120/62)  BP(mean): 70 (16 Dec 2024 13:00) (57 - 81)  ABP: --  ABP(mean): --  RR: 26 (16 Dec 2024 13:00) (16 - 41)  SpO2: 97% (16 Dec 2024 13:00) (87% - 99%)    O2 Parameters below as of 16 Dec 2024 12:00  Patient On (Oxygen Delivery Method): nasal cannula, high flow  O2 Flow (L/min): 40  O2 Concentration (%): 60            I&O's Summary    15 Dec 2024 07:01  -  16 Dec 2024 07:00  --------------------------------------------------------  IN: 125 mL / OUT: 200 mL / NET: -75 mL    16 Dec 2024 07:01  -  16 Dec 2024 13:39  --------------------------------------------------------  IN: 420 mL / OUT: 0 mL / NET: 420 mL                              8.7    12.39 )-----------( 259      ( 16 Dec 2024 05:31 )             27.1       12-16    141  |  107  |  29[H]  ----------------------------<  165[H]  3.0[L]   |  29  |  1.09    Ca    8.5      16 Dec 2024 05:31  Phos  3.7     12-16  Mg     2.2     12-16                  Urinalysis Basic - ( 16 Dec 2024 05:31 )    Color: x / Appearance: x / SG: x / pH: x  Gluc: 165 mg/dL / Ketone: x  / Bili: x / Urobili: x   Blood: x / Protein: x / Nitrite: x   Leuk Esterase: x / RBC: x / WBC x   Sq Epi: x / Non Sq Epi: x / Bacteria: x        MEDICATIONS  (STANDING):  chlorhexidine 4% Liquid 1 Application(s) Topical daily  enoxaparin Injectable 30 milliGRAM(s) SubCutaneous every 24 hours  hydrocortisone sodium succinate Injectable 50 milliGRAM(s) IV Push every 8 hours  influenza  Vaccine (HIGH DOSE) 0.5 milliLiter(s) IntraMuscular once  levothyroxine 75 MICROGram(s) Oral daily  pantoprazole    Tablet 40 milliGRAM(s) Oral before breakfast  piperacillin/tazobactam IVPB.. 3.375 Gram(s) IV Intermittent every 8 hours    MEDICATIONS  (PRN):      DVT Prophylaxis:    Advanced Directives:  Discussed with:    Visit Information: 30 min    ** Time is exclusive of billed procedures and/or teaching and/or routine family updates.

## 2024-12-16 NOTE — PROGRESS NOTE ADULT - SUBJECTIVE AND OBJECTIVE BOX
ICU  Note    HPI:    S:    Pt seen and examined  Ms. Aguilera is a 83 YO F w/pmhx of Alzheimer's disease, hypothyroidism, DM2, MSSA bacteremia, diverticulitis s/p sigmoidectomy, HLD presents to ED w/fever and cough. Patient found to have pneumonia. UA negative. Received sepsis fluid bolus and additional fluid but remains hypotensive. Patient is requiring Levophed. Lactate trended up to 3.0. Mild GAYLA based on previous labs. Received Azithromycin and Zosyn.  at bedside. DNR/DNI     12/16: Remains on HFNC being actively titrated. Septic. Remains SOB.    ROS: + SOB  Remainder of systems reviewed, negative        Allergies    Compazine (Other)  Compazine (Unknown)    Intolerances        MEDICATIONS  (STANDING):  acetaminophen   IVPB .. 675 milliGRAM(s) IV Intermittent once  chlorhexidine 4% Liquid 1 Application(s) Topical daily  enoxaparin Injectable 30 milliGRAM(s) SubCutaneous every 24 hours  hydrocortisone sodium succinate Injectable 50 milliGRAM(s) IV Push every 8 hours  influenza  Vaccine (HIGH DOSE) 0.5 milliLiter(s) IntraMuscular once  levothyroxine 75 MICROGram(s) Oral daily  pantoprazole    Tablet 40 milliGRAM(s) Oral before breakfast  piperacillin/tazobactam IVPB.. 3.375 Gram(s) IV Intermittent every 8 hours    MEDICATIONS  (PRN):  LORazepam   Injectable 0.25 milliGRAM(s) IV Push every 4 hours PRN anxiety or agitation  morphine  - Injectable 2 milliGRAM(s) IV Push every 4 hours PRN dyspnea      Drug Dosing Weight  Height (cm): 162.6 (24 Sep 2024 23:27)  Weight (kg): 45.359 (10 Dec 2024 17:23)  BMI (kg/m2): 17.2 (10 Dec 2024 17:23)  BSA (m2): 1.46 (10 Dec 2024 17:23)      PAST MEDICAL & SURGICAL HISTORY:  Diverticulosis      H/O: hypothyroidism      Hypercholesterolemia      Fibromyalgia      Acute type 2 diabetes mellitus with manifestations  boderline- diet controlled      Ankle fracture  s/p right- cast      Alzheimer disease      Hypothyroidism      Frequent UTI      History of cholecystectomy      History of tonsillectomy      Diverticulitis of sigmoid colon  s/p sigmoidectomy      S/P cataract surgery, left      No significant past surgical history          FAMILY HISTORY:  No pertinent family history in first degree relatives          REVIEW OF SYSTEMS    UNLESS OTHERWISE NOTED IN HPI above:    Constitutional:  No Weight Change, No Fever, No Chills, No Night Sweats, No Fatigue, No Malaise  ENT/Mouth:  No Hearing Changes, No Ear Pain, No Nasal Congestion, No  Sinus Pain, No Hoarseness, No sore throat, No Rhinorrhea, No Swallowing  Difficulty  Eyes:  No Eye Pain, No Swelling, No Redness, No Foreign Body, No Discharge, No Vision Changes  Cardiovascular:  No Chest Pain, No SOB, No PND, No Dyspnea on Exertion,  No Orthopnea, No Claudication, No Edema, No Palpitations  Respiratory:  No Cough, No Sputum, No Wheezing, No Smoke Exposure, No Dyspnea  Gastrointestinal:  No Nausea, No Vomiting, No Diarrhea, No  Constipation, No Pain, No Heartburn, No Anorexia, No Dysphagia, No  Hematochezia, No Melena, No Flatulence, No Jaundice  Genitourinary:  No Dysmenorrhea, No DUB, No Dyspareunia, No Dysuria, No  Urinary Frequency, No Hematuria, No Urinary Incontinence, No Urgency,  No Flank Pain, No Urinary Flow Changes, No Hesitancy  Musculoskeletal:  No Arthralgias, No Myalgias, No Joint Swelling, No  Joint Stiffness, No Back Pain, No Neck Pain, No Injury History  Skin:  No Skin Lesions, No Pruritis, No Hair Changes, No Breast/Skin Changes, No Nipple Discharge  Neuro:  No Weakness, No Numbness, No Paresthesias, No Loss of  Consciousness, No Syncope, No Dizziness, No Headache, No Coordination  Changes, No Recent Falls  Psych:  No Anxiety/Panic, No Depression, No Insomnia, No Personality  Changes, No Delusions, No Rumination, No SI/HI/AH/VH, No Social Issues,  No Memory Changes, No Violence/Abuse Hx., No Eating Concerns  Heme/Lymph:  No Bruising, No Bleeding, No Transfusions History, No Lymphadenopathy  Endocrine:  No Polyuria, No Polydipsia, No Temperature Intolerance    O:    ICU Vital Signs Last 24 Hrs  T(C): 38 (16 Dec 2024 22:00), Max: 38 (16 Dec 2024 22:00)  T(F): 100.4 (16 Dec 2024 22:00), Max: 100.4 (16 Dec 2024 22:00)  HR: 71 (16 Dec 2024 22:00) (60 - 81)  BP: 113/55 (16 Dec 2024 22:00) (93/50 - 132/65)  BP(mean): 71 (16 Dec 2024 22:00) (57 - 85)  ABP: --  ABP(mean): --  RR: 29 (16 Dec 2024 22:00) (18 - 41)  SpO2: 91% (16 Dec 2024 22:00) (87% - 100%)    O2 Parameters below as of 16 Dec 2024 20:00  Patient On (Oxygen Delivery Method): nasal cannula, high flow  O2 Flow (L/min): 40  O2 Concentration (%): 75            I&O's Detail    15 Dec 2024 07:01  -  16 Dec 2024 07:00  --------------------------------------------------------  IN:    IV PiggyBack: 125 mL  Total IN: 125 mL    OUT:    Voided (mL): 200 mL  Total OUT: 200 mL    Total NET: -75 mL      16 Dec 2024 07:01  -  16 Dec 2024 22:33  --------------------------------------------------------  IN:    IV PiggyBack: 400 mL    Oral Fluid: 180 mL  Total IN: 580 mL    OUT:  Total OUT: 0 mL    Total NET: 580 mL              PE:    General: Mild respiratory distress   HEENT: Pupils equal, reactive to light.  Symmetric.  PULM: Diminished breath sounds b/l   NECK: Supple, no lymphadenopathy, trachea midline  CVS: Regular rate and rhythm, no murmurs, rubs, or gallops  ABD: Soft, nondistended, nontender  EXT: No edema, nontender  SKIN: Warm and well perfused  Skin pink, warm    LABS:    CBC Full  -  ( 16 Dec 2024 05:31 )  WBC Count : 12.39 K/uL  RBC Count : 2.91 M/uL  Hemoglobin : 8.7 g/dL  Hematocrit : 27.1 %  Platelet Count - Automated : 259 K/uL  Mean Cell Volume : 93.1 fl  Mean Cell Hemoglobin : 29.9 pg  Mean Cell Hemoglobin Concentration : 32.1 g/dL  Auto Neutrophil # : x  Auto Lymphocyte # : x  Auto Monocyte # : x  Auto Eosinophil # : x  Auto Basophil # : x  Auto Neutrophil % : x  Auto Lymphocyte % : x  Auto Monocyte % : x  Auto Eosinophil % : x  Auto Basophil % : x    12-16    141  |  107  |  29[H]  ----------------------------<  165[H]  3.0[L]   |  29  |  1.09    Ca    8.5      16 Dec 2024 05:31  Phos  3.7     12-16  Mg     2.2     12-16        Urinalysis Basic - ( 16 Dec 2024 05:31 )    Color: x / Appearance: x / SG: x / pH: x  Gluc: 165 mg/dL / Ketone: x  / Bili: x / Urobili: x   Blood: x / Protein: x / Nitrite: x   Leuk Esterase: x / RBC: x / WBC x   Sq Epi: x / Non Sq Epi: x / Bacteria: x      CAPILLARY BLOOD GLUCOSE

## 2024-12-16 NOTE — GOALS OF CARE CONVERSATION - ADVANCED CARE PLANNING - CONVERSATION DETAILS
HPI:  Ms. Aguilera is a 85 YO F w/pmhx of Alzheimer's disease, hypothyroidism, DM2, MSSA bacteremia, diverticulitis s/p sigmoidectomy, HLD presents to ED w/fever and cough. Patient found to have pneumonia. DNR/DNI  (10 Dec 2024 23:15)      PERTINENT PMH REVIEWED:  [ X ] YES [ ] NO           Primary Contact:  Irineo HCP     HCP [ X ] Surrogate [   ] Guardian [   ]    Mental Status: [  ] Alert  [  ] Oriented [  ] Confused [  X  ] Lethargic [  ]  Concerns of Depression [  ]- Unable to assess, not identified by family   Anxiety [   ]- Unable to assess, not identified by family   Baseline ADLs (prior to admission):  Independent [ ] moderately [ ] fully   Dependent   [ ] moderately [ X ] fully    Anticipated Grief: Patient [  ] Family [ X ]    Caregiver [place] Assessed: Yes [ X  ] No [  ]    Druze: Religion     Spiritual Concerns: Not identified     Goals of Care: to be determined, leaning toward comfort     Previous Services: None    ADVANCE DIRECTIVES: DNR/DNI Trial of Non Invasive     Anticipated D/C Plan: to be further determined, family to speak today                      Summary:    This SW spoke with pts /HCP Irineo via phone to discuss goals of care, assist with planning and provide supportive counseling. Pt. was residing home with her  prior to admission. He reports that he is pts primary caretaker and she needs total care. He reports that she has been "surviving" at home. Feelings explored and support provided.   not a good prognosis want her to pass in the hospital because she gets the best care no intubation     Goals of care discussed. Pts  shared that he is aware that pts prognosis is poor and ultimately he just wants pt. to comfortable. We discussed how pt. has needed High flow O@ and that this is a life sustaining measure. We discussed the option that if pt. continues to need this to take pt. off, understanding her time may be limited and focus on her comfort. We also reviewed option of hospice as well and their services/pholsophy of care. Pts  reports he plans to speak with his family today and decide which direction to go in. He shared that he would prefer pt. to stay int [facility] and be kept comfortable here as opposed to Hospice.     Plan at this time is for pts  to speak with his family today and then decide if they want strict comfort focus. Emotional support provided. Our team will continue to follow.
This LATISHA and KYAW Bobo met with pts family to further discuss goals of care. We discussed how pt. is currently on High Flow. Pts family shared that they have DNR/DNI in place and would never want feeding tube. They were hoping to avoid making decision to take things away with these orders in place and that pt. would die on her own. Feelings explored and support provided. We discussed that the High Flow is prolonging pts life and the option to remove it at any time, understanding her time may be limited, and focus on keeping her comfortable. We reviewed how pt. can have medications to keep her comfortable. Pts family would like to give pt. medication as needed for pain/symptom management, even if he makes her more tired as the goal is to keep her comfortable however, they want to give pt. another day on the High Flow before making a decision to remove. Pts family hopes pt. will die on her own. Our team will follow up with family tomorrow. Plan to continue with High Flow for now. Emotional support provided. Our team will continue to follow.

## 2024-12-16 NOTE — CONSULT NOTE ADULT - SUBJECTIVE AND OBJECTIVE BOX
HPI:   " 85 YO F w/pmhx of Alzheimer's disease, hypothyroidism, DM2, MSSA bacteremia, diverticulitis s/p sigmoidectomy, HLD presents to ED w/fever and cough. Patient found to have pneumonia. UA negative. Received sepsis fluid bolus and additional fluid but remains hypotensive. Patient is requiring Levophed. Lactate trended up to 3.0. Mild GAYLA based on previous labs. Received Azithromycin and Zosyn.  at bedside. DNR/DNI "    12/16  pt with baseline dementia   unable to participate in goc  pt is dnr dni   presented w/ worsening septeic shock, pneumonia vs. uti     She is currently on HFNC as per yesterdays CC note family was discussing transition to comfort/hspice approach.     PAIN: ( ) YES  ( X)  NO  Pt unable to characterise d/t clinical status     DYSPNEA ( ) Yes ( X) No  Patient unable to characterise d/t clinical status       Level:    PAST MEDICAL & SURGICAL HISTORY:  Diverticulosis      H/O: hypothyroidism      Hypercholesterolemia      Fibromyalgia      Acute type 2 diabetes mellitus with manifestations  boderline- diet controlled      Ankle fracture  s/p right- cast      Alzheimer disease      Hypothyroidism      Frequent UTI      History of cholecystectomy      History of tonsillectomy      Diverticulitis of sigmoid colon  s/p sigmoidectomy      S/P cataract surgery, left      No significant past surgical history          SOCIAL HX:    Hx opiate tolerance ( )YES  (x )NO    Baseline ADLs  (Prior to Admission)  ( ) Independent   ( x)Dependent    FAMILY HISTORY:  No pertinent family history in first degree relatives        Review of Systems:     Unable to obtain/Limited due to: AMS      PHYSICAL EXAM:    Vital Signs Last 24 Hrs  T(C): 37.5 (15 Dec 2024 16:00), Max: 38 (15 Dec 2024 14:30)  T(F): 99.5 (15 Dec 2024 16:00), Max: 100.4 (15 Dec 2024 14:30)  HR: 66 (16 Dec 2024 09:00) (62 - 83)  BP: 100/46 (16 Dec 2024 09:00) (85/41 - 120/62)  BP(mean): 63 (16 Dec 2024 09:00) (55 - 81)  RR: 26 (16 Dec 2024 09:00) (13 - 41)  SpO2: 93% (16 Dec 2024 09:00) (87% - 99%)    Parameters below as of 16 Dec 2024 08:00  Patient On (Oxygen Delivery Method): nasal cannula, high flow  O2 Flow (L/min): 40  O2 Concentration (%): 60  Daily     Daily     PPSV2: 30  %  FAST:    General: calm in NAD  Mental Status:  somnolent  HEENT: eomi,    Lungs: decreased b/s seen on hfnc this morning  Cardiac: s1s2 no mgr  GI: soft nontender +BS  : voids  Ext: moves all 4 extremities         LABS:                        8.7    12.39 )-----------( 259      ( 16 Dec 2024 05:31 )             27.1     12-16    141  |  107  |  29[H]  ----------------------------<  165[H]  3.0[L]   |  29  |  1.09    Ca    8.5      16 Dec 2024 05:31  Phos  3.7     12-16  Mg     2.2     12-16        Albumin: Albumin: 2.7 g/dL (12-11 @ 05:49)      Allergies    Compazine (Other)  Compazine (Unknown)    Intolerances      MEDICATIONS  (STANDING):  chlorhexidine 4% Liquid 1 Application(s) Topical daily  enoxaparin Injectable 30 milliGRAM(s) SubCutaneous every 24 hours  hydrocortisone sodium succinate Injectable 50 milliGRAM(s) IV Push every 8 hours  influenza  Vaccine (HIGH DOSE) 0.5 milliLiter(s) IntraMuscular once  levothyroxine 75 MICROGram(s) Oral daily  pantoprazole    Tablet 40 milliGRAM(s) Oral before breakfast  piperacillin/tazobactam IVPB.. 3.375 Gram(s) IV Intermittent every 8 hours  potassium chloride  10 mEq/100 mL IVPB 10 milliEquivalent(s) IV Intermittent every 1 hour    MEDICATIONS  (PRN):      RADIOLOGY/ADDITIONAL STUDIES:

## 2024-12-16 NOTE — PROGRESS NOTE ADULT - NEUROLOGICAL
sensation intact/responds to pain/responds to verbal commands
sensation intact/responds to pain/no spontaneous movement

## 2024-12-16 NOTE — PROGRESS NOTE ADULT - ASSESSMENT
A/P: 84 female P/W Septic shock from a UTI  Dementia  Hypothyroidism    Plan:  ICU    PULM: Continue HF NC O2 Fi O2 60%, wean FI o2 as she tolerates    Cardio: Hypotensive from septic shock, wean bri    Renal: Replace lytes as needed    GI: PO Diet, While she is likely aspirating she will not get a PEG and the family agrees to let her continue eating since it is one of the few pleasures she has left    ENDO: Synthroid increased to 75 from 50    ID: Urine with E. Coli and kleb both sensitive to Zosyn    Lovenox DVT Prophylaxis    D/W The patient Son and Daughter again today her current condition.  If the patient worsens we will likely transition to Comfort Care

## 2024-12-17 NOTE — PROGRESS NOTE ADULT - ASSESSMENT
Process of Care  --Reviewed dx/treatment problems and alignment with Goals of Care  septic shock 2/2 to pneumonia   daisy w/ lactic acidosis  hfnc dependent at this time- with titration attempts on going   comfort recommended  family wanted to consider overnight  team to follow up today.       Physical Aspects of Care  --Pain  c/w current management    --Bowel Regimen  risk for constipation d/t immobility  daily dulcolax    --Dyspnea  comfortable and in NAD    --Nausea Vomiting  denies    --Weakness  PT as tolerated     Psychological and Psychiatric Aspects of Care:   --Greif/Bereavment: emotional support provided  --Hx of psychiatric dx: none  -Pastoral Care Available PRN     Social Aspects of Care  -SW involved     Cultural Aspects  -Primary Language: English    Goals of Care:     We discussed Palliative Care team being a supportive team when a patient has ongoing illnesses.  We also discussed that it is not an end of life care service, but can help navigate symptoms and emotional support througout their hospital stay here.    Hospice was explained as well  as an end of life care philosophy.  When a disease cannot be cured, or family/patient decide the treatment burdens out weigh the risk and one choses to change focus of treatment from cure to quality/comfort.       Prognosis: Death can occur at anytime, but if disease continues to progress naturally patient likely has days     Ethical and Legal Aspects:   NA        Capacity: no capacity  HCP/Surrogate: is surrogate  Code Status: dnr dni trial niv  MOLST:dnr dni trial niv   Dispo Plan: pending furtehr discussion w/ surrogates    Discussed With: Case coordinated with attending and SW and RN     Time Spent:55 minutes including the care, coordination and counseling of this patient, 50% of which was spent coordinating and counseling.

## 2024-12-17 NOTE — PROGRESS NOTE ADULT - ASSESSMENT
84 female P/W Septic shock from a UTI  Dementia  Hypothyroidism    Plan:  ICU    PULM: Continue HF NC O2 Fi O2 70%, wean FI o2 as she tolerates, ARDS, likley chronic aspiration  Cardio: was Hypotensive from septic shock, off pressors  Renal: Replace lytes as needed,creatinine ok  GI: PO Diet, While she is likely aspirating she will not get a PEG and the family agrees to let her continue eating since it is one of the few pleasures she has left  ENDO: Synthroid increased to 75 from 50  ID: Urine with E. Coli and kleb both sensitive to Zosyn  Lovenox DVT Prophylaxis    Remains at risk of respiratory decompensation

## 2024-12-17 NOTE — PROGRESS NOTE ADULT - ASSESSMENT
84F w/ pmhx alzheimer's disease, HLD, hypothyroidism, DM2, diverticulitis s/p sigmoidectomy, frequent UTIs presented to  ED 12/10 w/ fever and cough. Pt found to have:    1. Septic shock (improved) 2/2  2. PNA / ARDS  3. UTI (e. coli, klebsiella)  4. GAYLA (improving)    Plan:  Neuro:  - Somnolent, confused, baseline dementia  - Ativan/morphine PRN for anxiety/WOB    CV:  - S/p bri for shock, now hemodynamically stable. Actively maintaining MAP >65  - TTE 9/2024 w/ preserved EF, mild-mod MR, mod-TR, grade 1 diastolic dysfunction  - Stress dose steroids    Pulm:  - HFNC 35L/75%, remains intermittently tachypneic  - CXR concerning for PNA/ARDS/aspiration  - Actively titrating supplemental oxygen to maintain spo2>92%                  GI:  - Pureed diet  - Protonix daily    Renal:  - GAYLA improving, Cr now 1.08  - Replacing electrolytes as needed with Goal K> 4, PO> 3, Mg> 2               - Strict I&O's w/ primafit but intermittently incontinent  - Avoiding nephrotoxic medication  - F/u AM CMP, mag, phos    Heme:  - Lovenox for DVT ppx    ID:  - WBC 15.90, Tmax 100.4 x24 hours  - Multifocal PNA, concerning for ARDS on repeat CXR. Likely chronic aspiration  - Ucx + e. coli and klebsiella, on Zosyn for abx. Staph aureus + on PCR, s/p bactroban x5 days  - Trend CBC with diff and fever curve    Endo:  - Synthroid increased to 75 mcg daily  - Goal blood glucose 140-180    Dispo: Remains in ICU. Prognosis guarded. At high risk of further respiratory decompensation. DNR/DNI w/ trial NIV    Will discuss case and plan with intensivist in AM      Critical Care Time (EXCLUSIVE of any non bundled procedures): 34 minutes were spent assessing the patient's presenting problems of acute illness that pose a high probability of life threatening deterioration or end organ damage / dysfunction. Medical desicion making includes initiation / continuation of plan or care review data/ labwork/ radiographic study, direct patient care bedside, discussions with consultants regarding care, evaluation and interpretation of vital signs, any necessary ventilator management, NIV or BIPAP changes or initiation, discussions with multidisipliary team, am or pm rounds, discussions of goals of care with patient and family all non-inclusive of procedures.    Date of entry of this note is equal to the date of services rendered 84F w/ pmhx alzheimer's disease, HLD, hypothyroidism, DM2, diverticulitis s/p sigmoidectomy, frequent UTIs presented to  ED 12/10 w/ fever and cough. Pt found to have:    1. Septic shock (improved)  2. Acute hypoxic respiratory failure 2/2  3. PNA / ARDS  4. UTI (e. coli, klebsiella)  5. GAYLA (improving)    Plan:  Neuro:  - Somnolent, confused, baseline dementia  - Ativan/morphine PRN for anxiety/WOB    CV:  - S/p bri for shock, now hemodynamically stable. Actively maintaining MAP >65  - TTE 9/2024 w/ preserved EF, mild-mod MR, mod-TR, grade 1 diastolic dysfunction  - Stress dose steroids    Pulm:  - HFNC 35L/75%, remains intermittently tachypneic  - CXR concerning for PNA/ARDS/aspiration  - Actively titrating supplemental oxygen to maintain spo2>92%                  GI:  - Pureed diet  - Protonix daily    Renal:  - GAYLA improving, Cr now 1.08  - Replacing electrolytes as needed with Goal K> 4, PO> 3, Mg> 2               - Strict I&O's w/ primafit but intermittently incontinent  - Avoiding nephrotoxic medication  - F/u AM CMP, mag, phos    Heme:  - Lovenox for DVT ppx    ID:  - WBC 15.90, Tmax 100.4 x24 hours  - Multifocal PNA, concerning for ARDS on repeat CXR. Likely chronic aspiration  - Ucx + e. coli and klebsiella, on Zosyn for abx. Staph aureus + on PCR, s/p bactroban x5 days  - Trend CBC with diff and fever curve    Endo:  - Synthroid increased to 75 mcg daily  - Goal blood glucose 140-180    Dispo: Remains in ICU. Prognosis guarded. At high risk of further respiratory decompensation. DNR/DNI w/ trial NIV    Will discuss case and plan with intensivist in AM      Critical Care Time (EXCLUSIVE of any non bundled procedures): 34 minutes were spent assessing the patient's presenting problems of acute illness that pose a high probability of life threatening deterioration or end organ damage / dysfunction. Medical desicion making includes initiation / continuation of plan or care review data/ labwork/ radiographic study, direct patient care bedside, discussions with consultants regarding care, evaluation and interpretation of vital signs, any necessary ventilator management, NIV or BIPAP changes or initiation, discussions with multidisipliary team, am or pm rounds, discussions of goals of care with patient and family all non-inclusive of procedures.    Date of entry of this note is equal to the date of services rendered

## 2024-12-17 NOTE — PROGRESS NOTE ADULT - SUBJECTIVE AND OBJECTIVE BOX
Patient is a 84y old  Female who presents with a chief complaint of Septic shock (17 Dec 2024 12:37)      BRIEF HOSPITAL COURSE:   84F w/ pmhx alzheimer's disease, HLD, hypothyroidism, DM2, diverticulitis s/p sigmoidectomy, frequent UTIs presented to  ED 12/10 w/ fever and cough. CXR noted LLL opacities concerning for PNA. In ED pt also hypotensive despite having received sepsis fluid boluses plus additional IVF, started on vasopressor. Pt also w/ lactic acidosis and GAYLA. Admitted to ICU for further medical management. Placed on HFNC for acute hypoxic respiratory failure 2/2 PNA. Ucx also positive for E. coli and klebsiella. Pt now off bri. Repeat CXR w/ worsening b/l infiltrates, concern for ARDS.    Events last 24 hours:  Remains on HFNC 35L/75%. Remains off bri, hemodynamically stable.    PAST MEDICAL & SURGICAL HISTORY:  Diverticulosis  H/O: hypothyroidism  Hypercholesterolemia  Fibromyalgia  Acute type 2 diabetes mellitus with manifestations, boderline- diet controlled  Ankle fracture s/p right- cast  Alzheimer disease  Hypothyroidism  Frequent UTI  History of cholecystectomy  History of tonsillectomy  Diverticulitis of sigmoid colon s/p sigmoidectomy  S/P cataract surgery, left  No significant past surgical history    Allergies  Compazine (Other)    FAMILY HISTORY:  No pertinent family history in first degree relatives      Review of Systems:  [ ] A ten-point review of systems was otherwise negative except as noted.  [X] Due to altered mental status/intubation, subjective information were not able to be obtained from the patient. History was obtained, to the extent possible, from review of the chart and collateral sources of information.    Medications:  piperacillin/tazobactam IVPB.. 3.375 Gram(s) IV Intermittent every 8 hours  acetaminophen   IVPB .. 1000 milliGRAM(s) IV Intermittent once  LORazepam   Injectable 0.25 milliGRAM(s) IV Push every 4 hours PRN  morphine  - Injectable 2 milliGRAM(s) IV Push every 4 hours PRN  enoxaparin Injectable 30 milliGRAM(s) SubCutaneous every 24 hours  pantoprazole    Tablet 40 milliGRAM(s) Oral before breakfast  hydrocortisone sodium succinate Injectable 50 milliGRAM(s) IV Push every 8 hours  levothyroxine 75 MICROGram(s) Oral daily  chlorhexidine 4% Liquid 1 Application(s) Topical daily      ICU Vital Signs Last 24 Hrs  T(C): 37.6 (17 Dec 2024 18:00), Max: 37.6 (17 Dec 2024 18:00)  T(F): 99.6 (17 Dec 2024 18:00), Max: 99.6 (17 Dec 2024 18:00)  HR: 68 (17 Dec 2024 21:00) (56 - 68)  BP: 121/66 (17 Dec 2024 21:00) (103/47 - 137/60)  BP(mean): 82 (17 Dec 2024 21:00) (63 - 91)  ABP: --  ABP(mean): --  RR: 21 (17 Dec 2024 21:00) (17 - 30)  SpO2: 100% (17 Dec 2024 21:00) (92% - 100%)  O2 Parameters below as of 17 Dec 2024 21:00  Patient On (Oxygen Delivery Method): nasal cannula, high flow  O2 Flow (L/min): 35  O2 Concentration (%): 75      I&O's Detail  16 Dec 2024 07:01  -  17 Dec 2024 07:00  --------------------------------------------------------  IN:    IV PiggyBack: 400 mL    Oral Fluid: 180 mL  Total IN: 580 mL  OUT:  Total OUT: 0 mL  Total NET: 580 mL    17 Dec 2024 07:01  -  17 Dec 2024 23:20  --------------------------------------------------------  IN:    IV PiggyBack: 100 mL  Total IN: 100 mL  OUT:    Incontinent per Collection Bag (mL): 200 mL  Total OUT: 200 mL  Total NET: -100 mL      LABS:             10.6   15.90 )-----------( 371      ( 17 Dec 2024 05:38 )             33.9     12-17    143  |  107  |  27[H]  ----------------------------<  172[H]  3.9   |  30  |  1.08    Ca    9.1      17 Dec 2024 05:38  Phos  3.2     12-17  Mg     2.5     12-17      Urinalysis Basic - ( 17 Dec 2024 05:38 )  Color: x / Appearance: x / SG: x / pH: x  Gluc: 172 mg/dL / Ketone: x  / Bili: x / Urobili: x   Blood: x / Protein: x / Nitrite: x   Leuk Esterase: x / RBC: x / WBC x   Sq Epi: x / Non Sq Epi: x / Bacteria: x      Physical Examination:  General: Elderly female sitting up in hospital bed, ill-appearing  HEENT: Pupils equal, reactive to light. Symmetric.  PULM: Diminished bs b/l, tachypneic  NECK: Supple  CVS: Regular rate and rhythm, +s1/s2.  ABD: Soft, nondistended, nontender, normoactive bowel sounds.  EXT: No edema, nontender.  SKIN: Warm and dry  NEURO: Somnolent      RADIOLOGY:  < from: Xray Chest 1 View-PORTABLE IMMEDIATE (12.10.24 @ 17:29) >  IMPRESSION:    Patchy left mid-lower lung opacities compatible with pneumonia in the   appropriate clinical setting.    < end of copied text >  < from: Xray Chest 1 View- PORTABLE-Routine (Xray Chest 1 View- PORTABLE-Routine in AM.) (12.14.24 @ 07:49) >  IMPRESSION:    Diffusely increased lung opacities bilaterally. This may reflect   pulmonary edema or multifocal infection. Cannot exclude ARDS. Correlate   clinically.  Small bilateral pleural effusions, new on the 12/14/2024 x-ray are   decreased on the most recent exam.    < end of copied text >   Detail Level: Detailed Render Post-Care Instructions In Note?: no Show Applicator Variable?: Yes Duration Of Freeze Thaw-Cycle (Seconds): 0 Post-Care Instructions: I reviewed with the patient in detail post-care instructions. Patient is to wear sunprotection, and avoid picking at any of the treated lesions. Pt may apply Vaseline to crusted or scabbing areas. Consent: The patient's consent was obtained including but not limited to risks of crusting, scabbing, blistering, scarring, darker or lighter pigmentary change, recurrence, incomplete removal and infection.

## 2024-12-17 NOTE — PROGRESS NOTE ADULT - SUBJECTIVE AND OBJECTIVE BOX
Interval History:     Patient remains on high flow 40l/75% slightly dyspneic     cxr c/w ARDS     Patient awake, alert confused    HPI:       Ms. Aguilera is a 85 YO F w/pmhx of Alzheimer's disease, hypothyroidism, DM2, MSSA bacteremia, diverticulitis s/p sigmoidectomy, HLD presents to ED w/fever and cough. Patient found to have pneumonia. UA negative. Received sepsis fluid bolus and additional fluid but remains hypotensive. Patient is requiring Levophed  Received Azithromycin and Zosyn.   DNR/DNI    Was on pressors now off  worsening cxr,   increasing hypoxia       PMH: as above         MEDICATIONS  (STANDING):  acetaminophen   IVPB .. 1000 milliGRAM(s) IV Intermittent once  chlorhexidine 4% Liquid 1 Application(s) Topical daily  enoxaparin Injectable 30 milliGRAM(s) SubCutaneous every 24 hours  furosemide   Injectable 20 milliGRAM(s) IV Push once  hydrocortisone sodium succinate Injectable 50 milliGRAM(s) IV Push every 8 hours  influenza  Vaccine (HIGH DOSE) 0.5 milliLiter(s) IntraMuscular once  levothyroxine 75 MICROGram(s) Oral daily  pantoprazole    Tablet 40 milliGRAM(s) Oral before breakfast  piperacillin/tazobactam IVPB.. 3.375 Gram(s) IV Intermittent every 8 hours    MEDICATIONS  (PRN):  LORazepam   Injectable 0.25 milliGRAM(s) IV Push every 4 hours PRN anxiety or agitation  morphine  - Injectable 2 milliGRAM(s) IV Push every 4 hours PRN dyspnea    ICU Vital Signs Last 24 Hrs  T(C): 37 (17 Dec 2024 10:00), Max: 38 (16 Dec 2024 22:00)  T(F): 98.6 (17 Dec 2024 10:00), Max: 100.4 (16 Dec 2024 22:00)  HR: 56 (17 Dec 2024 11:00) (56 - 81)  BP: 115/49 (17 Dec 2024 11:00) (96/51 - 133/58)  BP(mean): 69 (17 Dec 2024 11:00) (63 - 91)  ABP: --  ABP(mean): --  RR: 24 (17 Dec 2024 11:00) (17 - 36)  SpO2: 98% (17 Dec 2024 11:00) (87% - 100%)    O2 Parameters below as of 17 Dec 2024 11:00  Patient On (Oxygen Delivery Method): nasal cannula, high flow  O2 Flow (L/min): 40  O2 Concentration (%): 75    I&O's Summary    16 Dec 2024 07:01  -  17 Dec 2024 07:00  --------------------------------------------------------  IN: 580 mL / OUT: 0 mL / NET: 580 mL    Physical Exam    General - dyspneic  HEENT - nc/at, high flow  Neck supple  lungs bilateral crackles  cv rrr  abdomen - soft, non tender  extremeties - warm   voiding                       10.6   15.90 )-----------( 371      ( 17 Dec 2024 05:38 )             33.9       12-17    143  |  107  |  27[H]  ----------------------------<  172[H]  3.9   |  30  |  1.08    Ca    9.1      17 Dec 2024 05:38  Phos  3.2     12-17  Mg     2.5     12-17    Urinalysis Basic - ( 17 Dec 2024 05:38 )    Color: x / Appearance: x / SG: x / pH: x  Gluc: 172 mg/dL / Ketone: x  / Bili: x / Urobili: x   Blood: x / Protein: x / Nitrite: x   Leuk Esterase: x / RBC: x / WBC x   Sq Epi: x / Non Sq Epi: x / Bacteria: x    DVT Prophylaxis:    Lovenox                                                             Advanced Directives: DNR/DNI         Labs, Notes, Orders, radiologic studies reviewed and care coordinated with multidisciplinary team

## 2024-12-17 NOTE — PROGRESS NOTE ADULT - SUBJECTIVE AND OBJECTIVE BOX
HPI:    CODE STATUS:  dnr dni trial niv    case discussed w/ family again yesterday evening  will follow up today  pt was struggling with the idea of weaning off hiflo.   patient  apepared comfortable this morning - had required a dose of morphine d/t discomfort overnight          Pain and Dyspnea:   PAIN: ( ) YES  ( X)  NO  Pt unable to characterise d/t clinical status     DYSPNEA ( ) Yes ( X) No  Patient unable to characterise d/t clinical status     Review of Systems:                PHYSICAL EXAM:    Vital Signs Last 24 Hrs  T(C): 36.9 (17 Dec 2024 06:00), Max: 38 (16 Dec 2024 22:00)  T(F): 98.4 (17 Dec 2024 06:00), Max: 100.4 (16 Dec 2024 22:00)  HR: 60 (17 Dec 2024 09:00) (57 - 81)  BP: 130/62 (17 Dec 2024 09:00) (96/51 - 133/58)  BP(mean): 82 (17 Dec 2024 09:00) (63 - 91)  RR: 23 (17 Dec 2024 09:00) (17 - 36)  SpO2: 92% (17 Dec 2024 09:00) (87% - 100%)    Parameters below as of 17 Dec 2024 09:00  Patient On (Oxygen Delivery Method): nasal cannula, high flow  O2 Flow (L/min): 40  O2 Concentration (%): 75  Daily     Daily     PPSV2: 30  %  FAST:    General: calm in NAD  Mental Status: somnolent  HEENT: eomi, perrl  Lungs: ctabl b/l bs  Cardiac: s1s2 no mgr  GI: soft nontender +BS  : voids  Ext: no erythema or swelling    LABS and RADIOLOGY: REVIEWED

## 2024-12-18 NOTE — PROGRESS NOTE ADULT - SUBJECTIVE AND OBJECTIVE BOX
Interval History:      Pateint remans on high flow      more lethargic      poor PO intake      last cxr pneumona, vs. ARDS vs. Inc PVC      recieved lasix yesterday      remains on high flow oxygen  HPI:       Ms. Aguilera is a 83 YO F w/pmhx of Alzheimer's disease, hypothyroidism, DM2, MSSA bacteremia, diverticulitis s/p sigmoidectomy, HLD presents to ED w/fever and cough. Patient found to have pneumonia. UA negative. Received sepsis fluid bolus and additional fluid but remains hypotensive. Patient is requiring Levophed  Received Azithromycin and Zosyn.   DNR/DNI    Was on pressors now off  worsening cxr,   increasing hypoxia     PMH: as above       MEDICATIONS  (STANDING):  acetaminophen   IVPB .. 1000 milliGRAM(s) IV Intermittent once  chlorhexidine 4% Liquid 1 Application(s) Topical daily  enoxaparin Injectable 30 milliGRAM(s) SubCutaneous every 24 hours  hydrocortisone sodium succinate Injectable 50 milliGRAM(s) IV Push every 8 hours  levothyroxine 75 MICROGram(s) Oral daily  magnesium sulfate  IVPB 1 Gram(s) IV Intermittent once  pantoprazole    Tablet 40 milliGRAM(s) Oral before breakfast  piperacillin/tazobactam IVPB.. 3.375 Gram(s) IV Intermittent every 8 hours  potassium chloride   Powder 40 milliEquivalent(s) Oral every 4 hours    MEDICATIONS  (PRN):  LORazepam   Injectable 0.25 milliGRAM(s) IV Push every 4 hours PRN anxiety or agitation  morphine  - Injectable 2 milliGRAM(s) IV Push every 4 hours PRN dyspnea    CU Vital Signs Last 24 Hrs  T(C): 37.6 (17 Dec 2024 22:00), Max: 37.6 (17 Dec 2024 18:00)  T(F): 99.7 (17 Dec 2024 22:00), Max: 99.7 (17 Dec 2024 22:00)  HR: 67 (18 Dec 2024 10:30) (61 - 79)  BP: 115/59 (18 Dec 2024 10:30) (85/73 - 137/60)  BP(mean): 76 (18 Dec 2024 10:30) (69 - 84)  ABP: --  ABP(mean): --  RR: 19 (18 Dec 2024 10:30) (16 - 38)  SpO2: 97% (18 Dec 2024 10:30) (80% - 100%)    O2 Parameters below as of 18 Dec 2024 07:00  Patient On (Oxygen Delivery Method): nasal cannula, high flow  O2 Flow (L/min): 35  O2 Concentration (%): 75    Physical Exam    general ill  Neuro lethartic, non focal , minimally verbal  HEENT high flow on  Neck supple  lungs bilateral rales   cv rrr  Abdomen soft, non tender  extremities 1+ edema  gu voiding        I&O's Summary    17 Dec 2024 07:01  -  18 Dec 2024 07:00  --------------------------------------------------------  IN: 100 mL / OUT: 200 mL / NET: -100 mL                        10.5   14.69 )-----------( 353      ( 18 Dec 2024 05:32 )             33.0       12-18    145  |  108  |  28[H]  ----------------------------<  200[H]  2.8[LL]   |  31  |  1.11    Ca    8.9      18 Dec 2024 05:32  Phos  2.4     12-18  Mg     2.3     12-18    TPro  6.5  /  Alb  1.9[L]  /  TBili  0.4  /  DBili  x   /  AST  19  /  ALT  26  /  AlkPhos  78  12-18    Urinalysis Basic - ( 18 Dec 2024 05:32 )    Color: x / Appearance: x / SG: x / pH: x  Gluc: 200 mg/dL / Ketone: x  / Bili: x / Urobili: x   Blood: x / Protein: x / Nitrite: x   Leuk Esterase: x / RBC: x / WBC x   Sq Epi: x / Non Sq Epi: x / Bacteria: x    DVT Prophylaxis:  Lovenox                                                              Advanced Directives: Full Code         Labs, Notes, Orders, radiologic studies reviewed and care coordinated with multidisciplinary team

## 2024-12-18 NOTE — PROGRESS NOTE ADULT - SUBJECTIVE AND OBJECTIVE BOX
Patient is a 84y old  Female who presents with a chief complaint of Septic shock (18 Dec 2024 11:40)      BRIEF HOSPITAL COURSE: ***    Events last 24 hours: ***    PAST MEDICAL & SURGICAL HISTORY:  Diverticulosis      H/O: hypothyroidism      Hypercholesterolemia      Fibromyalgia      Acute type 2 diabetes mellitus with manifestations  boderline- diet controlled      Ankle fracture  s/p right- cast      Alzheimer disease      Hypothyroidism      Frequent UTI      History of cholecystectomy      History of tonsillectomy      Diverticulitis of sigmoid colon  s/p sigmoidectomy      S/P cataract surgery, left      No significant past surgical history        Allergies    Compazine (Other)  Compazine (Unknown)    Intolerances      FAMILY HISTORY:  No pertinent family history in first degree relatives      SOCIAL HISTORY:     Review of Systems:  [ ] A ten-point review of systems was otherwise negative except as noted.  [ ] Due to altered mental status/intubation, subjective information were not able to be obtained from the patient. History was obtained, to the extent possible, from review of the chart and collateral sources of information.    Medications:  piperacillin/tazobactam IVPB.. 3.375 Gram(s) IV Intermittent every 8 hours        acetaminophen   IVPB .. 1000 milliGRAM(s) IV Intermittent once  LORazepam   Injectable 0.25 milliGRAM(s) IV Push every 4 hours PRN  morphine  - Injectable 2 milliGRAM(s) IV Push every 4 hours PRN      enoxaparin Injectable 30 milliGRAM(s) SubCutaneous every 24 hours    pantoprazole    Tablet 40 milliGRAM(s) Oral before breakfast      hydrocortisone sodium succinate Injectable 50 milliGRAM(s) IV Push every 8 hours  levothyroxine 75 MICROGram(s) Oral daily        chlorhexidine 4% Liquid 1 Application(s) Topical daily            ICU Vital Signs Last 24 Hrs  T(C): 37.6 (18 Dec 2024 21:00), Max: 37.6 (18 Dec 2024 21:00)  T(F): 99.7 (18 Dec 2024 21:00), Max: 99.7 (18 Dec 2024 21:00)  HR: 73 (18 Dec 2024 21:00) (62 - 119)  BP: 126/64 (18 Dec 2024 21:00) (85/73 - 127/66)  BP(mean): 82 (18 Dec 2024 21:00) (69 - 83)  ABP: --  ABP(mean): --  RR: 25 (18 Dec 2024 21:00) (16 - 38)  SpO2: 89% (18 Dec 2024 21:00) (80% - 99%)    O2 Parameters below as of 18 Dec 2024 21:00  Patient On (Oxygen Delivery Method): nasal cannula, high flow  O2 Flow (L/min): 35  O2 Concentration (%): 75      Vital Signs Last 24 Hrs  T(C): 37.6 (18 Dec 2024 21:00), Max: 37.6 (18 Dec 2024 21:00)  T(F): 99.7 (18 Dec 2024 21:00), Max: 99.7 (18 Dec 2024 21:00)  HR: 73 (18 Dec 2024 21:00) (62 - 119)  BP: 126/64 (18 Dec 2024 21:00) (85/73 - 127/66)  BP(mean): 82 (18 Dec 2024 21:00) (69 - 83)  RR: 25 (18 Dec 2024 21:00) (16 - 38)  SpO2: 89% (18 Dec 2024 21:00) (80% - 99%)    Parameters below as of 18 Dec 2024 21:00  Patient On (Oxygen Delivery Method): nasal cannula, high flow  O2 Flow (L/min): 35  O2 Concentration (%): 75        I&O's Detail    17 Dec 2024 07:01  -  18 Dec 2024 07:00  --------------------------------------------------------  IN:    IV PiggyBack: 100 mL  Total IN: 100 mL    OUT:    Incontinent per Collection Bag (mL): 200 mL  Total OUT: 200 mL    Total NET: -100 mL          LABS:                        10.5   14.69 )-----------( 353      ( 18 Dec 2024 05:32 )             33.0     12-18    145  |  108  |  28[H]  ----------------------------<  200[H]  2.8[LL]   |  31  |  1.11    Ca    8.9      18 Dec 2024 05:32  Phos  2.4     12-18  Mg     2.3     12-18    TPro  6.5  /  Alb  1.9[L]  /  TBili  0.4  /  DBili  x   /  AST  19  /  ALT  26  /  AlkPhos  78  12-18          CAPILLARY BLOOD GLUCOSE          Urinalysis Basic - ( 18 Dec 2024 05:32 )    Color: x / Appearance: x / SG: x / pH: x  Gluc: 200 mg/dL / Ketone: x  / Bili: x / Urobili: x   Blood: x / Protein: x / Nitrite: x   Leuk Esterase: x / RBC: x / WBC x   Sq Epi: x / Non Sq Epi: x / Bacteria: x        Physical Examination:  General: Well appearing, lying in bed in NAD.  HEENT: Pupils equal, reactive to light. Symmetric. No scleral icterus or injection.  PULM: Clear to auscultation B/L. No wheezes, rales, or rhonchi appreciated. No significant sputum production or increased respiratory effort.  NECK: Supple, no lymphadenopathy, trachea midline.  CVS: Regular rate and rhythm, no murmurs appreciated, +s1/s2.  ABD: Soft, nondistended, nontender, normoactive bowel sounds.  EXT: No edema, nontender.  SKIN: Warm and well perfused, no rashes noted.  NEURO: Alert, oriented, interactive, nonfocal.      RADIOLOGY:  < from: Xray Chest 1 View-PORTABLE IMMEDIATE (12.10.24 @ 17:29) >  IMPRESSION:    Patchy left mid-lower lung opacities compatible with pneumonia in the   appropriate clinical setting.    < end of copied text >  < from: Xray Chest 1 View- PORTABLE-Routine (Xray Chest 1 View- PORTABLE-Routine in AM.) (12.14.24 @ 07:49) >  IMPRESSION:    Diffusely increased lung opacities bilaterally. This may reflect   pulmonary edema or multifocal infection. Cannot exclude ARDS. Correlate   clinically.  Small bilateral pleural effusions, new on the 12/14/2024 x-ray are   decreased on the most recent exam.    < end of copied text >   Patient is a 84y old  Female who presents with a chief complaint of Septic shock (18 Dec 2024 11:40)      BRIEF HOSPITAL COURSE:  84F w/ pmhx alzheimer's disease, HLD, hypothyroidism, DM2, diverticulitis s/p sigmoidectomy, frequent UTIs presented to  ED 12/10 w/ fever and cough. CXR noted LLL opacities concerning for PNA. In ED pt also hypotensive despite having received sepsis fluid boluses plus additional IVF, started on vasopressor. Pt also w/ lactic acidosis and GAYLA. Admitted to ICU for further medical management. Placed on HFNC for acute hypoxic respiratory failure 2/2 PNA. Ucx also positive for E. coli and klebsiella. Pt now off bri. Repeat CXR w/ worsening b/l infiltrates, concern for ARDS.    Events last 24 hours:  Remains on HFNC at 35L/75%, sating in low 90s. Lethargic but arousable. Intermittently tachypneic, relief w/ PRN morphine for WOB.    PAST MEDICAL & SURGICAL HISTORY:  Diverticulosis  Hypercholesterolemia  Fibromyalgia  Acute type 2 diabetes mellitus with manifestations, boderline- diet controlled  Ankle fracture s/p right- cast  Alzheimer disease  Hypothyroidism  Frequent UTI  History of cholecystectomy  History of tonsillectomy  Diverticulitis of sigmoid colon s/p sigmoidectomy  S/P cataract surgery, left    No significant past surgical history    Allergies  Compazine (Other)    FAMILY HISTORY:  No pertinent family history in first degree relatives      Review of Systems:  [ ] A ten-point review of systems was otherwise negative except as noted.  [X] Due to altered mental status/intubation, subjective information were not able to be obtained from the patient. History was obtained, to the extent possible, from review of the chart and collateral sources of information.    Medications:  piperacillin/tazobactam IVPB.. 3.375 Gram(s) IV Intermittent every 8 hours  acetaminophen   IVPB .. 1000 milliGRAM(s) IV Intermittent once  LORazepam   Injectable 0.25 milliGRAM(s) IV Push every 4 hours PRN  morphine  - Injectable 2 milliGRAM(s) IV Push every 4 hours PRN  enoxaparin Injectable 30 milliGRAM(s) SubCutaneous every 24 hours  pantoprazole    Tablet 40 milliGRAM(s) Oral before breakfast  hydrocortisone sodium succinate Injectable 50 milliGRAM(s) IV Push every 8 hours  levothyroxine 75 MICROGram(s) Oral daily  chlorhexidine 4% Liquid 1 Application(s) Topical daily      ICU Vital Signs Last 24 Hrs  T(C): 37.6 (18 Dec 2024 21:00), Max: 37.6 (18 Dec 2024 21:00)  T(F): 99.7 (18 Dec 2024 21:00), Max: 99.7 (18 Dec 2024 21:00)  HR: 73 (18 Dec 2024 21:00) (62 - 119)  BP: 126/64 (18 Dec 2024 21:00) (85/73 - 127/66)  BP(mean): 82 (18 Dec 2024 21:00) (69 - 83)  ABP: --  ABP(mean): --  RR: 25 (18 Dec 2024 21:00) (16 - 38)  SpO2: 89% (18 Dec 2024 21:00) (80% - 99%)  O2 Parameters below as of 18 Dec 2024 21:00  Patient On (Oxygen Delivery Method): nasal cannula, high flow  O2 Flow (L/min): 35  O2 Concentration (%): 75      I&O's Detail    17 Dec 2024 07:01  -  18 Dec 2024 07:00  --------------------------------------------------------  IN:    IV PiggyBack: 100 mL  Total IN: 100 mL    OUT:    Incontinent per Collection Bag (mL): 200 mL  Total OUT: 200 mL    Total NET: -100 mL          LABS:                        10.5   14.69 )-----------( 353      ( 18 Dec 2024 05:32 )             33.0     12-18    145  |  108  |  28[H]  ----------------------------<  200[H]  2.8[LL]   |  31  |  1.11    Ca    8.9      18 Dec 2024 05:32  Phos  2.4     12-18  Mg     2.3     12-18    TPro  6.5  /  Alb  1.9[L]  /  TBili  0.4  /  DBili  x   /  AST  19  /  ALT  26  /  AlkPhos  78  12-18          CAPILLARY BLOOD GLUCOSE          Urinalysis Basic - ( 18 Dec 2024 05:32 )    Color: x / Appearance: x / SG: x / pH: x  Gluc: 200 mg/dL / Ketone: x  / Bili: x / Urobili: x   Blood: x / Protein: x / Nitrite: x   Leuk Esterase: x / RBC: x / WBC x   Sq Epi: x / Non Sq Epi: x / Bacteria: x        Physical Examination:  General: Well appearing, lying in bed in NAD.  HEENT: Pupils equal, reactive to light. Symmetric. No scleral icterus or injection.  PULM: Clear to auscultation B/L. No wheezes, rales, or rhonchi appreciated. No significant sputum production or increased respiratory effort.  NECK: Supple, no lymphadenopathy, trachea midline.  CVS: Regular rate and rhythm, no murmurs appreciated, +s1/s2.  ABD: Soft, nondistended, nontender, normoactive bowel sounds.  EXT: No edema, nontender.  SKIN: Warm and well perfused, no rashes noted.  NEURO: Alert, oriented, interactive, nonfocal.      RADIOLOGY:  < from: Xray Chest 1 View-PORTABLE IMMEDIATE (12.10.24 @ 17:29) >  IMPRESSION:    Patchy left mid-lower lung opacities compatible with pneumonia in the   appropriate clinical setting.    < end of copied text >  < from: Xray Chest 1 View- PORTABLE-Routine (Xray Chest 1 View- PORTABLE-Routine in AM.) (12.14.24 @ 07:49) >  IMPRESSION:    Diffusely increased lung opacities bilaterally. This may reflect   pulmonary edema or multifocal infection. Cannot exclude ARDS. Correlate   clinically.  Small bilateral pleural effusions, new on the 12/14/2024 x-ray are   decreased on the most recent exam.    < end of copied text >   Patient is a 84y old  Female who presents with a chief complaint of Septic shock (18 Dec 2024 11:40)      BRIEF HOSPITAL COURSE:  84F w/ pmhx alzheimer's disease, HLD, hypothyroidism, DM2, diverticulitis s/p sigmoidectomy, frequent UTIs presented to  ED 12/10 w/ fever and cough. CXR noted LLL opacities concerning for PNA. In ED pt also hypotensive despite having received sepsis fluid boluses plus additional IVF, started on vasopressor. Pt also w/ lactic acidosis and GAYLA. Admitted to ICU for further medical management. Placed on HFNC for acute hypoxic respiratory failure 2/2 PNA. Ucx also positive for E. coli and klebsiella. Pt now off bri. Repeat CXR w/ worsening b/l infiltrates, concern for ARDS.    Events last 24 hours:  Remains on HFNC at 35L/75%, sating in low 90s. Lethargic but arousable. Intermittently tachypneic, relief w/ PRN morphine for WOB.    PAST MEDICAL & SURGICAL HISTORY:  Diverticulosis  Hypercholesterolemia  Fibromyalgia  Acute type 2 diabetes mellitus with manifestations, boderline- diet controlled  Ankle fracture s/p right- cast  Alzheimer disease  Hypothyroidism  Frequent UTI  History of cholecystectomy  History of tonsillectomy  Diverticulitis of sigmoid colon s/p sigmoidectomy  S/P cataract surgery, left    No significant past surgical history    Allergies  Compazine (Other)    FAMILY HISTORY:  No pertinent family history in first degree relatives      Review of Systems:  [ ] A ten-point review of systems was otherwise negative except as noted.  [X] Due to altered mental status/intubation, subjective information were not able to be obtained from the patient. History was obtained, to the extent possible, from review of the chart and collateral sources of information.    Medications:  piperacillin/tazobactam IVPB.. 3.375 Gram(s) IV Intermittent every 8 hours  acetaminophen   IVPB .. 1000 milliGRAM(s) IV Intermittent once  LORazepam   Injectable 0.25 milliGRAM(s) IV Push every 4 hours PRN  morphine  - Injectable 2 milliGRAM(s) IV Push every 4 hours PRN  enoxaparin Injectable 30 milliGRAM(s) SubCutaneous every 24 hours  pantoprazole    Tablet 40 milliGRAM(s) Oral before breakfast  hydrocortisone sodium succinate Injectable 50 milliGRAM(s) IV Push every 8 hours  levothyroxine 75 MICROGram(s) Oral daily  chlorhexidine 4% Liquid 1 Application(s) Topical daily      ICU Vital Signs Last 24 Hrs  T(C): 37.6 (18 Dec 2024 21:00), Max: 37.6 (18 Dec 2024 21:00)  T(F): 99.7 (18 Dec 2024 21:00), Max: 99.7 (18 Dec 2024 21:00)  HR: 73 (18 Dec 2024 21:00) (62 - 119)  BP: 126/64 (18 Dec 2024 21:00) (85/73 - 127/66)  BP(mean): 82 (18 Dec 2024 21:00) (69 - 83)  ABP: --  ABP(mean): --  RR: 25 (18 Dec 2024 21:00) (16 - 38)  SpO2: 89% (18 Dec 2024 21:00) (80% - 99%)  O2 Parameters below as of 18 Dec 2024 21:00  Patient On (Oxygen Delivery Method): nasal cannula, high flow  O2 Flow (L/min): 35  O2 Concentration (%): 75    I&O's Detail  17 Dec 2024 07:01  -  18 Dec 2024 07:00  --------------------------------------------------------  IN:    IV PiggyBack: 100 mL  Total IN: 100 mL  OUT:    Incontinent per Collection Bag (mL): 200 mL  Total OUT: 200 mL  Total NET: -100 mL      LABS:             10.5   14.69 )-----------( 353      ( 18 Dec 2024 05:32 )             33.0     12-18    145  |  108  |  28[H]  ----------------------------<  200[H]  2.8[LL]   |  31  |  1.11    Ca    8.9      18 Dec 2024 05:32  Phos  2.4     12-18  Mg     2.3     12-18  TPro  6.5  /  Alb  1.9[L]  /  TBili  0.4  /  DBili  x   /  AST  19  /  ALT  26  /  AlkPhos  78  12-18      Physical Examination:  General: Elderly female sitting up in hospital bed, ill-appearing  HEENT: Pupils equal, reactive to light. Symmetric.  PULM: Diminished bs b/l, tachypneic  NECK: Supple  CVS: Regular rate and rhythm, +s1/s2.  ABD: Soft, nondistended, nontender, normoactive bowel sounds.  EXT: No edema, nontender.  SKIN: Warm and dry, pale  NEURO: Intermittently awakens, lethargic      RADIOLOGY:  < from: Xray Chest 1 View-PORTABLE IMMEDIATE (12.10.24 @ 17:29) >  IMPRESSION:    Patchy left mid-lower lung opacities compatible with pneumonia in the   appropriate clinical setting.    < end of copied text >  < from: Xray Chest 1 View- PORTABLE-Routine (Xray Chest 1 View- PORTABLE-Routine in AM.) (12.14.24 @ 07:49) >  IMPRESSION:    Diffusely increased lung opacities bilaterally. This may reflect   pulmonary edema or multifocal infection. Cannot exclude ARDS. Correlate   clinically.  Small bilateral pleural effusions, new on the 12/14/2024 x-ray are   decreased on the most recent exam.    < end of copied text >

## 2024-12-18 NOTE — PROGRESS NOTE ADULT - ASSESSMENT
84F w/ pmhx alzheimer's disease, HLD, hypothyroidism, DM2, diverticulitis s/p sigmoidectomy, frequent UTIs presented to  ED 12/10 w/ fever and cough. Pt found to have:    1. Septic shock (improved)  2. Acute hypoxic respiratory failure 2/2  3. PNA / ARDS  4. UTI (e. coli, klebsiella)  5. GAYLA (improving)  6. Hypokalemia  7. Hypophosphatemia    Plan:  Neuro:  - Lethargic, confused, baseline dementia  - Ativan/morphine PRN for anxiety/WOB    CV:  - Hemodynamically stable w/o vasopressors. Actively maintaining MAP >65  - TTE 9/2024 w/ preserved EF, mild-mod MR, mod-TR, grade 1 diastolic dysfunction  - Stress dose steroids    Pulm:  - HFNC 35L/75%, remains intermittently tachypneic, morphine used for WOB w/ positive effect  - CXR concerning for PNA/ARDS/aspiration  - Actively titrating supplemental oxygen to maintain spo2>90%                  GI:  - Pureed diet  - Protonix daily    Renal:  - GAYLA improving, Cr 1.11  - K 2.8, phos 2.4, both replaced   - Replacing electrolytes as needed with Goal K> 4, PO> 3, Mg> 2               - Strict I&O's w/ primafit but intermittently incontinent  - Avoiding nephrotoxic medication  - F/u AM CMP, mag, phos    Heme:  - Lovenox for DVT ppx    ID:  - WBC 14.69, afebrile  - Multifocal PNA, concerning for ARDS on most recent CXR, likely component of chronic aspiration  - Ucx + e. coli and klebsiella, on Zosyn for abx. Staph aureus + on PCR, s/p bactroban x5 days  - Trend CBC with diff and fever curve    Endo:  - Synthroid daily  - Goal blood glucose 140-180    Dispo: Remains in ICU. Prognosis guarded. At high risk of further respiratory decompensation. DNR/DNI w/ trial NIV    Will discuss case and plan with intensivist in AM      Critical Care Time (EXCLUSIVE of any non bundled procedures): 32 minutes were spent assessing the patient's presenting problems of acute illness that pose a high probability of life threatening deterioration or end organ damage / dysfunction. Medical desicion making includes initiation / continuation of plan or care review data/ labwork/ radiographic study, direct patient care bedside, discussions with consultants regarding care, evaluation and interpretation of vital signs, any necessary ventilator management, NIV or BIPAP changes or initiation, discussions with multidisipliary team, am or pm rounds, discussions of goals of care with patient and family all non-inclusive of procedures.    Date of entry of this note is equal to the date of services rendered

## 2024-12-18 NOTE — PROGRESS NOTE ADULT - ASSESSMENT
84 female P/W Septic shock from a UTI  Dementia  Hypothyroidism    Plan:  ICU  Neuro more lethargic, llikely worsening encephalopathy from increase wob  PULM: Continue HF NC O2 Fi O2 70%, wean FI o2 as she tolerates, ARDS, likley chronic aspiration chronic pneumonia  Cardio: was Hypotensive from septic shock, off pressors  Renal: Replace lytes as needed,creatinine ok will diuresis wehn k better  GI: PO Diet, While she is likely aspirating she will not get a PEG and the family agrees to let her continue eating since it is one of the few pleasures she has left  ENDO: Synthroid increased to 75 from 50  ID: Urine with E. Coli and kleb both sensitive to Zosyn  Lovenox DVT Prophylaxis    Remains at risk of respiratory decompensation  Palliative care nput appreciated

## 2024-12-19 NOTE — PROGRESS NOTE ADULT - ASSESSMENT
84 female P/W Septic shock from a UTI  Dementia  Hypothyroidism    Plan:  ICU  Neuro more lethargic, llikely worsening encephalopathy from increase wob and illness, giving prn morphine for comfort  PULM: Continue HF NC O2 Fi O2 70%, wean FI o2 as she tolerates, ARDS, likley chronic aspiration chronic pneumonia  Cardio: was Hypotensive from septic shock, off pressors  Renal: Replace lytes as needed,creatinine ok   GI: no longer eating , While she is likely aspirating she will not get a PEG and the family agrees to let her continue eating since it is one of the few pleasures she has left  ENDO: Synthroid increased to 75 from 50  ID: Urine with E. Coli and kleb both sensitive to Zosyn  Lovenox DVT Prophylaxis    Remains at risk of respiratory decompensation  Palliative care nput appreciated  slowly moving towards more comfort measures as condition changes

## 2024-12-19 NOTE — PROGRESS NOTE ADULT - SUBJECTIVE AND OBJECTIVE BOX
Interval History:          T max 99.9           remains high flow dependent          dyspneic           wbc increased to 20    HPI:        HPI:       Ms. Aguilera is a 85 YO F w/pmhx of Alzheimer's disease, hypothyroidism, DM2, MSSA bacteremia, diverticulitis s/p sigmoidectomy, HLD presents to ED w/fever and cough. Patient found to have pneumonia. UA negative. Received sepsis fluid bolus and additional fluid but remains hypotensive. Patient is requiring Levophed  Received Azithromycin and Zosyn.   DNR/DNI    Was on pressors now off  worsening cxr,   increasing hypoxia     PMH: as above          MEDICATIONS  (STANDING):  acetaminophen   IVPB .. 1000 milliGRAM(s) IV Intermittent once  chlorhexidine 4% Liquid 1 Application(s) Topical daily  enoxaparin Injectable 30 milliGRAM(s) SubCutaneous every 24 hours  hydrocortisone sodium succinate Injectable 50 milliGRAM(s) IV Push every 8 hours  levothyroxine 75 MICROGram(s) Oral daily  pantoprazole    Tablet 40 milliGRAM(s) Oral before breakfast  piperacillin/tazobactam IVPB.. 3.375 Gram(s) IV Intermittent every 8 hours    MEDICATIONS  (PRN):  LORazepam   Injectable 0.25 milliGRAM(s) IV Push every 4 hours PRN anxiety or agitation  morphine  - Injectable 2 milliGRAM(s) IV Push every 4 hours PRN dyspnea    ICU Vital Signs Last 24 Hrs  T(C): 37.7 (19 Dec 2024 10:00), Max: 37.7 (19 Dec 2024 02:00)  T(F): 99.9 (19 Dec 2024 10:00), Max: 99.9 (19 Dec 2024 02:00)  HR: 81 (19 Dec 2024 10:00) (65 - 119)  BP: 130/66 (19 Dec 2024 10:00) (109/50 - 141/68)  BP(mean): 86 (19 Dec 2024 10:00) (69 - 88)  ABP: --  ABP(mean): --  RR: 19 (19 Dec 2024 10:00) (15 - 37)  SpO2: 98% (19 Dec 2024 10:00) (82% - 99%)    O2 Parameters below as of 19 Dec 2024 10:00  Patient On (Oxygen Delivery Method): nasal cannula, high flow  O2 Flow (L/min): 40  O2 Concentration (%): 80      I&O's Summary    18 Dec 2024 07:01  -  19 Dec 2024 07:00  --------------------------------------------------------  IN: 0 mL / OUT: 4 mL / NET: -4 mL    19 Dec 2024 07:01  -  19 Dec 2024 10:13  --------------------------------------------------------  IN: 250 mL / OUT: 0 mL / NET: 250 mL    Physical Exam     General - ill, lethargic  HEENT - high flow is on\  Neck supple  lungs bilateral crackles  cv rrr  abdomen - soft,  gu collins  neuro minimally responisve  extrmeiteis cool                          10.3   20.34 )-----------( 437      ( 19 Dec 2024 05:18 )             33.2       12-19    147[H]  |  113[H]  |  24[H]  ----------------------------<  247[H]  3.7   |  29  |  0.96    Ca    8.9      19 Dec 2024 05:18  Phos  2.1     12-19  Mg     2.5     12-19    TPro  6.5  /  Alb  1.9[L]  /  TBili  0.4  /  DBili  x   /  AST  19  /  ALT  26  /  AlkPhos  78  12-18    Urinalysis Basic - ( 19 Dec 2024 05:18 )    Color: x / Appearance: x / SG: x / pH: x  Gluc: 247 mg/dL / Ketone: x  / Bili: x / Urobili: x   Blood: x / Protein: x / Nitrite: x   Leuk Esterase: x / RBC: x / WBC x   Sq Epi: x / Non Sq Epi: x / Bacteria: x    DVT Prophylaxis:   Lovenox                                                              Advanced Directives: DNR/DNI         Labs, Notes, Orders, radiologic studies reviewed and care coordinated with multidisciplinary team

## 2024-12-19 NOTE — PROGRESS NOTE ADULT - ASSESSMENT
Process of Care  --Reviewed dx/treatment problems and alignment with Goals of Care  septic shock 2/2 to pneumonia   daisy w/ lactic acidosis  hfnc dependent at this time- with titration attempts on going   comfort recommended -family still not ready   family wants to continue current measures         Physical Aspects of Care  --Pain  c/w current management    --Bowel Regimen  risk for constipation d/t immobility  daily dulcolax    --Dyspnea  comfortable and in NAD    --Nausea Vomiting  denies    --Weakness  PT as tolerated     Psychological and Psychiatric Aspects of Care:   --Greif/Bereavment: emotional support provided  --Hx of psychiatric dx: none  -Pastoral Care Available PRN     Social Aspects of Care  -SW involved     Cultural Aspects  -Primary Language: English    Goals of Care:     We discussed Palliative Care team being a supportive team when a patient has ongoing illnesses.  We also discussed that it is not an end of life care service, but can help navigate symptoms and emotional support througout their hospital stay here.       Ethical and Legal Aspects:   NA    Capacity: no capacity  HCP/Surrogate:  is surrogate  Code Status: dnr dni trial niv  MOLST:dnr dni trial niv   Dispo Plan: pending furtehr discussion w/ surrogates    Discussed With: Case coordinated with attending and SW and RN     Time Spent:55 minutes including the care, coordination and counseling of this patient, 50% of which was spent coordinating and counseling.  Process of Care  --Reviewed dx/treatment problems and alignment with Goals of Care  septic shock 2/2 to pneumonia   daisy w/ lactic acidosis  hfnc dependent at this time- with titration attempts on going   comfort recommended -family still not ready   family wants to continue current measures   team follows for emotional support       Physical Aspects of Care  --Pain  c/w current management    --Bowel Regimen  risk for constipation d/t immobility  daily dulcolax    --Dyspnea  comfortable and in NAD    --Nausea Vomiting  denies    --Weakness  PT as tolerated     Psychological and Psychiatric Aspects of Care:   --Greif/Bereavment: emotional support provided  --Hx of psychiatric dx: none  -Pastoral Care Available PRN     Social Aspects of Care  -SW involved     Cultural Aspects  -Primary Language: English    Goals of Care:     We discussed Palliative Care team being a supportive team when a patient has ongoing illnesses.  We also discussed that it is not an end of life care service, but can help navigate symptoms and emotional support througout their hospital stay here.       Ethical and Legal Aspects:   NA    Capacity: no capacity  HCP/Surrogate:  is surrogate  Code Status: dnr dni trial niv  MOLST:dnr dni trial niv   Dispo Plan: pending furtehr discussion w/ surrogates    Discussed With: Case coordinated with attending and SW and RN     Time Spent:55 minutes including the care, coordination and counseling of this patient, 50% of which was spent coordinating and counseling.

## 2024-12-19 NOTE — PROGRESS NOTE ADULT - SUBJECTIVE AND OBJECTIVE BOX
ICU Vital Signs Last 24 Hrs  T(C): 37.7 (19 Dec 2024 10:00), Max: 37.7 (19 Dec 2024 02:00)  T(F): 99.9 (19 Dec 2024 10:00), Max: 99.9 (19 Dec 2024 02:00)  HR: 77 (19 Dec 2024 12:00) (65 - 119)  BP: 117/54 (19 Dec 2024 12:00) (109/50 - 141/68)  BP(mean): 73 (19 Dec 2024 12:00) (69 - 88)  ABP: --  ABP(mean): --  RR: 19 (19 Dec 2024 12:00) (15 - 37)  SpO2: 95% (19 Dec 2024 12:00) (82% - 98%)    O2 Parameters below as of 19 Dec 2024 12:00  Patient On (Oxygen Delivery Method): nasal cannula, high flow  O2 Flow (L/min): 40  O2 Concentration (%): 80          PPSV2: 30  %  FAST:    General: calm in NAD  Mental Status: somnolent  HEENT: eomi, perrl  Lungs: ctabl b/l bs  Cardiac: s1s2 no mgr  GI: soft nontender +BS  : voids  Ext: no erythema or swelling    LABS and RADIOLOGY: REVIEWED   HPI:    CODE STATUS:  dnr dni trial niv    pt remains on hiflo in nad  family struggling w/ weaning / comfort only  icu and pall in close contact  please contact pall team at [phone] for any changes  will continue to follow    Pain and Dyspnea:   PAIN: ( ) YES  ( X)  NO  Pt unable to characterise d/t clinical status     DYSPNEA ( ) Yes ( X) No  Patient unable to characterise d/t clinical status     Review of Systems:       limited d/t AMS          PHYSICAL EXAM:  ICU Vital Signs Last 24 Hrs  T(C): 37.7 (19 Dec 2024 18:00), Max: 37.7 (19 Dec 2024 02:00)  T(F): 99.9 (19 Dec 2024 18:00), Max: 99.9 (19 Dec 2024 02:00)  HR: 75 (19 Dec 2024 18:00) (67 - 82)  BP: 122/52 (19 Dec 2024 18:00) (106/50 - 141/68)  BP(mean): 69 (19 Dec 2024 18:00) (68 - 88)  ABP: --  ABP(mean): --  RR: 21 (19 Dec 2024 18:00) (15 - 37)  SpO2: 93% (19 Dec 2024 18:00) (82% - 98%)    O2 Parameters below as of 19 Dec 2024 18:00  Patient On (Oxygen Delivery Method): nasal cannula, high flow  O2 Flow (L/min): 40  O2 Concentration (%): 80      PPSV2: 30  %  FAST:    General: calm in NAD  Mental Status: somnolent  HEENT: eomi, perrl  Lungs: ctabl b/l bs  Cardiac: s1s2 no mgr  GI: soft nontender +BS  : voids  Ext: no erythema or swelling    LABS and RADIOLOGY: REVIEWED

## 2024-12-20 NOTE — PROGRESS NOTE ADULT - SUBJECTIVE AND OBJECTIVE BOX
Interval History:      patient become more lethargic,      remians high flow dependent       requrested to stop antibiotics    HPI:       Ms. Aguilera is a 85 YO F w/pmhx of Alzheimer's disease, hypothyroidism, DM2, MSSA bacteremia, diverticulitis s/p sigmoidectomy, HLD presents to ED w/fever and cough. Patient found to have pneumonia. UA negative. Received sepsis fluid bolus and additional fluid but remains hypotensive. Patient is requiring Levophed  Received Azithromycin and Zosyn.   DNR/DNI    Was on pressors now off  worsening cxr,   increasing hypoxia     PMH: as above       MEDICATIONS  (STANDING):  acetaminophen   IVPB .. 1000 milliGRAM(s) IV Intermittent once  chlorhexidine 4% Liquid 1 Application(s) Topical daily  enoxaparin Injectable 30 milliGRAM(s) SubCutaneous every 24 hours  levothyroxine 75 MICROGram(s) Oral daily  pantoprazole    Tablet 40 milliGRAM(s) Oral before breakfast    MEDICATIONS  (PRN):  LORazepam   Injectable 0.25 milliGRAM(s) IV Push every 4 hours PRN anxiety or agitation  morphine  - Injectable 2 milliGRAM(s) IV Push every 4 hours PRN dyspnea      ICU Vital Signs Last 24 Hrs  T(C): 37.6 (20 Dec 2024 04:00), Max: 37.7 (19 Dec 2024 14:00)  T(F): 99.7 (20 Dec 2024 04:00), Max: 99.9 (19 Dec 2024 18:00)  HR: 86 (20 Dec 2024 08:00) (74 - 86)  BP: 126/61 (20 Dec 2024 08:00) (106/50 - 137/67)  BP(mean): 80 (20 Dec 2024 08:00) (68 - 85)  ABP: --  ABP(mean): --  RR: 22 (20 Dec 2024 08:37) (18 - 26)  SpO2: 97% (20 Dec 2024 08:37) (89% - 98%)    O2 Parameters below as of 20 Dec 2024 08:37  Patient On (Oxygen Delivery Method): nasal cannula, high flow  O2 Flow (L/min): 40  O2 Concentration (%): 80    Physical Exam    General - ill, lethargic  HEENT - high flow is on   Neck supple  lungs bilateral crackles  cv rrr  abdomen - soft,  gu collins  neuro minimally responsive , more lethargic  extremeties - cool    I&O's Summary    19 Dec 2024 07:01  -  20 Dec 2024 07:00  --------------------------------------------------------  IN: 350 mL / OUT: 0 mL / NET: 350 mL                        9.4    15.47 )-----------( 355      ( 20 Dec 2024 05:15 )             30.1     12-20    148[H]  |  116[H]  |  22  ----------------------------<  161[H]  3.9   |  31  |  0.78    Ca    8.8      20 Dec 2024 05:15  Phos  2.1     12-19  Mg     2.5     12-19    TPro  5.8[L]  /  Alb  1.9[L]  /  TBili  0.6  /  DBili  x   /  AST  20  /  ALT  26  /  AlkPhos  90  12-20    Urinalysis Basic - ( 20 Dec 2024 05:15 )    Color: x / Appearance: x / SG: x / pH: x  Gluc: 161 mg/dL / Ketone: x  / Bili: x / Urobili: x   Blood: x / Protein: x / Nitrite: x   Leuk Esterase: x / RBC: x / WBC x   Sq Epi: x / Non Sq Epi: x / Bacteria: x    DVT Prophylaxis:   Lovenox                                                              Advanced Directives: Full Code         Labs, Notes, Orders, radiologic studies reviewed and care coordinated with multidisciplinary team

## 2024-12-20 NOTE — PROGRESS NOTE ADULT - ASSESSMENT
84 female P/W Septic shock from a UTI  Dementia  Hypothyroidism    Plan:  ICU       Neuro more lethargic, llikely worsening encephalopathy from increase wob and illness, giving prn morphine for comfort       PULM: Continue HF NC O2 Fi O2 70%, wean FI o2 as she tolerates, ARDS, likley chronic aspiration chronic pneumonia       Cardio: was Hypotensive from septic shock, off pressors       Renal: Replace lytes as needed,creatinine ok        GI: no longer eating , While she is likely aspirating she will not get a PEG and the family agrees to let her continue eating since it is one of the few pleasures she has left       ENDO: Synthroid increased to 75 from 50       ID: Urine with E. Coli and kleb both sensitive to Zosyn/family requested stopping       Lovenox DVT Prophylaxis       Remains at risk of respiratory decompensation       Palliative care input appreciated       slowly moving towards more comfort measures as condition changes

## 2024-12-21 NOTE — PROVIDER CONTACT NOTE (OTHER) - SITUATION
Office is closed.  Please fax discharge papers to [phone].
Emailed Omayra regarding sepsis.  Aubrie WILKINS

## 2024-12-21 NOTE — PROGRESS NOTE ADULT - SUBJECTIVE AND OBJECTIVE BOX
Interval History:     Patient becoming increasingly obtunded,     on high flow 80% 40 liters      low grade fever to 101     family at bedside    HPI:       Ms. Aguilera is a 83 YO F w/pmhx of Alzheimer's disease, hypothyroidism, DM2, MSSA bacteremia, diverticulitis s/p sigmoidectomy, HLD presents to ED w/fever and cough. Patient found to have pneumonia. UA negative. Received sepsis fluid bolus and additional fluid but remains hypotensive. Patient is requiring Levophed  Received Azithromycin and Zosyn.   DNR/DNI    Was on pressors now off  worsening cxr,   increasing hypoxia     PMH: as above      ROS cant obtain      MEDICATIONS  (STANDING):  acetaminophen   IVPB .. 675 milliGRAM(s) IV Intermittent once  acetaminophen   IVPB .. 1000 milliGRAM(s) IV Intermittent once  chlorhexidine 4% Liquid 1 Application(s) Topical daily  enoxaparin Injectable 30 milliGRAM(s) SubCutaneous every 24 hours  levothyroxine 75 MICROGram(s) Oral daily  pantoprazole    Tablet 40 milliGRAM(s) Oral before breakfast    MEDICATIONS  (PRN):  acetaminophen  Suppository .. 650 milliGRAM(s) Rectal every 6 hours PRN Temp greater or equal to 38C (100.4F)  LORazepam   Injectable 0.25 milliGRAM(s) IV Push every 4 hours PRN anxiety or agitation  morphine  - Injectable 2 milliGRAM(s) IV Push every 4 hours PRN dyspnea    ICU Vital Signs Last 24 Hrs  T(C): 38.5 (21 Dec 2024 10:00), Max: 39 (21 Dec 2024 08:30)  T(F): 101.3 (21 Dec 2024 10:00), Max: 102.2 (21 Dec 2024 08:30)  HR: 92 (21 Dec 2024 11:00) (77 - 114)  BP: 106/56 (21 Dec 2024 11:00) (93/52 - 135/80)  BP(mean): 71 (21 Dec 2024 11:00) (64 - 99)  ABP: --  ABP(mean): --  RR: 20 (21 Dec 2024 12:22) (18 - 31)  SpO2: 93% (21 Dec 2024 12:22) (83% - 95%)    O2 Parameters below as of 21 Dec 2024 12:22  Patient On (Oxygen Delivery Method): nasal cannula, high flow  O2 Flow (L/min): 40  O2 Concentration (%): 80    Physical exam    General - ill, minimally responsive  HEENT - nc/at, high flow on  neck supple  lungs bilateral rhonchi  cv rrr  abdomen soft,   extremteis cool mottled  neuro minimally responive    I&O's Summary                          9.4    15.47 )-----------( 355      ( 20 Dec 2024 05:15 )             30.1       12-20    148[H]  |  116[H]  |  22  ----------------------------<  161[H]  3.9   |  31  |  0.78    Ca    8.8      20 Dec 2024 05:15    TPro  5.8[L]  /  Alb  1.9[L]  /  TBili  0.6  /  DBili  x   /  AST  20  /  ALT  26  /  AlkPhos  90  12-20    Urinalysis Basic - ( 20 Dec 2024 05:15 )    Color: x / Appearance: x / SG: x / pH: x  Gluc: 161 mg/dL / Ketone: x  / Bili: x / Urobili: x   Blood: x / Protein: x / Nitrite: x   Leuk Esterase: x / RBC: x / WBC x   Sq Epi: x / Non Sq Epi: x / Bacteria: x    DVT Prophylaxis:   Lovenox                                                              Advanced Directives: DNR/DNI no escalation, no abx         Labs, Notes, Orders, radiologic studies reviewed and care coordinated with multidisciplinary team

## 2024-12-21 NOTE — PROGRESS NOTE ADULT - ASSESSMENT
· Assessment	  84 female P/W Septic shock from a UTI  Dementia  Hypothyroidism    Plan:  ICU       Neuro more lethargic, llikely worsening encephalopathy from increase wob and illness, giving prn morphine for comfort       PULM: Continue HF NC O2 Fi O2 70%, wean FI o2 as she tolerates, ARDS, likley chronic aspiration chronic pneumonia       Cardio: was Hypotensive from septic shock, off pressors       Renal:  hydration       GI: no longer eating , While she is likely aspirating she will not get a PEG and the family agrees to let her continue eating since it is one of the few pleasures she has left       ENDO: Synthroid increased to 75 from 50       ID: Urine with E. Coli and kleb both sensitive to Zosyn/family requested stopping now off abx. will not reculture       Lovenox DVT Prophylaxis       Remains at risk of respiratory decompensation       Palliative care input appreciated       essentially comfort measures but family does not want to remove highflow       situation discussed with family

## 2024-12-22 NOTE — PROGRESS NOTE ADULT - SUBJECTIVE AND OBJECTIVE BOX
Interval History:       patient seen,        now 02 desaturation, less responsive       getting morphine prn    HPI:       Ms. Aguilera is a 85 YO F w/pmhx of Alzheimer's disease, hypothyroidism, DM2, MSSA bacteremia, diverticulitis s/p sigmoidectomy, HLD presents to ED w/fever and cough. Patient found to have pneumonia. UA negative. Received sepsis fluid bolus and additional fluid but remains hypotensive. Patient is requiring Levophed  Received Azithromycin and Zosyn.   DNR/DNI    Was on pressors now off  increasing hypoxia     PMH: as above      ROS cant obtain Patient obtunded     MEDICATIONS  (STANDING):  acetaminophen   IVPB .. 1000 milliGRAM(s) IV Intermittent once  acetaminophen   IVPB .. 675 milliGRAM(s) IV Intermittent once  chlorhexidine 4% Liquid 1 Application(s) Topical daily  enoxaparin Injectable 30 milliGRAM(s) SubCutaneous every 24 hours  levothyroxine 75 MICROGram(s) Oral daily  pantoprazole    Tablet 40 milliGRAM(s) Oral before breakfast    MEDICATIONS  (PRN):  acetaminophen  Suppository .. 650 milliGRAM(s) Rectal every 6 hours PRN Temp greater or equal to 38C (100.4F)  LORazepam   Injectable 0.25 milliGRAM(s) IV Push every 4 hours PRN anxiety or agitation  morphine  - Injectable 2 milliGRAM(s) IV Push every 4 hours PRN dyspnea    ICU Vital Signs Last 24 Hrs  T(C): 37.2 (22 Dec 2024 09:00), Max: 38.6 (21 Dec 2024 16:00)  T(F): 98.9 (22 Dec 2024 09:00), Max: 101.4 (21 Dec 2024 16:00)  HR: 75 (22 Dec 2024 11:00) (74 - 99)  BP: 93/59 (22 Dec 2024 11:00) (93/59 - 136/64)  BP(mean): 71 (22 Dec 2024 11:00) (71 - 93)  ABP: --  ABP(mean): --  RR: 24 (22 Dec 2024 11:00) (17 - 29)  SpO2: 90% (22 Dec 2024 11:00) (86% - 93%)    O2 Parameters below as of 22 Dec 2024 11:00  Patient On (Oxygen Delivery Method): nasal cannula, high flow    O2 Concentration (%): 80    Physical Exam    general ill  HEENT - nc/at high flow on  neck supple  lungs bilateral rhonchi  cv rrr  abdomen soft, nontender  extremity mottled  neuro minimally responsive  skin no9 rash                                                                  Labs, Notes, Orders, radiologic studies reviewed and care coordinated with multidisciplinary team

## 2024-12-22 NOTE — PROGRESS NOTE ADULT - ASSESSMENT
84 female P/W Septic shock from a UTI  Dementia  Hypothyroidism    Plan:  ICU       Neuro more lethargic, llikely worsening encephalopathy from increase wob and illness, giving prn morphine for comfort       PULM: Continue HF NC O2 Fi O2 70%, wean FI o2 as she tolerates, ARDS, likley chronic aspiration chronic pneumonia       Cardio: was Hypotensive from septic shock, off pressors       Renal:  hydration       GI: no longer eating , While she is likely aspirating she will not get a PEG and the family agrees to let her continue eating since it is one of the few pleasures she has left       ENDO: Synthroid increased to 75 from 50       ID: Urine with E. Coli and kleb both sensitive to Zosyn/family requested stopping now off abx. will not reculture       Lovenox DVT Prophylaxis       Remains at risk of respiratory decompensation       Palliative care input appreciated       essentially comfort measures but family does not want to remove highflow       situation discussed with family prognosis poor

## 2024-12-23 NOTE — PROGRESS NOTE ADULT - SUBJECTIVE AND OBJECTIVE BOX
OVERNIGHT EVENTS / SUBJECTIVE: Patient seen and examined at bedside.     No acute events overnight. Pt remains on HFNC 80% with stable O2 sat. Pt appears comfortable, minimally responsive. Receiving prn moprhine and ativan    OBJECTIVE:    VITAL SIGNS:  ICU Vital Signs Last 24 Hrs  T(C): 37.4 (23 Dec 2024 05:30), Max: 37.4 (23 Dec 2024 05:30)  T(F): 99.4 (23 Dec 2024 05:30), Max: 99.4 (23 Dec 2024 05:30)  HR: 86 (23 Dec 2024 16:00) (65 - 86)  BP: 107/56 (23 Dec 2024 16:00) (95/83 - 120/68)  BP(mean): 70 (23 Dec 2024 16:00) (66 - 89)  ABP: --  ABP(mean): --  RR: 19 (23 Dec 2024 16:00) (18 - 25)  SpO2: 96% (23 Dec 2024 16:00) (85% - 96%)    O2 Parameters below as of 23 Dec 2024 16:00  Patient On (Oxygen Delivery Method): nasal cannula, high flow  O2 Flow (L/min): 40  O2 Concentration (%): 80          CAPILLARY BLOOD GLUCOSE          PHYSICAL EXAM:    General: Frail, ill-appearing, in no acute distress, minimally responsive  HEENT: NC/AT; on HFNC  Neck: supple  Respiratory: B/l rhonchi  Cardiovascular: +S1/S2; RRR  Abdomen: soft, NT/ND  Extremities: Cool, mottled, no LE edema  Skin: normal color and turgor; no rash  Neurological: Minimally responsive    MEDICATIONS:  MEDICATIONS  (STANDING):  acetaminophen   IVPB .. 1000 milliGRAM(s) IV Intermittent once  acetaminophen   IVPB .. 675 milliGRAM(s) IV Intermittent once  chlorhexidine 4% Liquid 1 Application(s) Topical daily  pantoprazole  Injectable 40 milliGRAM(s) IV Push daily    MEDICATIONS  (PRN):  acetaminophen  Suppository .. 650 milliGRAM(s) Rectal every 6 hours PRN Temp greater or equal to 38C (100.4F)  LORazepam   Injectable 0.25 milliGRAM(s) IV Push every 4 hours PRN anxiety or agitation  morphine  - Injectable 2 milliGRAM(s) IV Push every 4 hours PRN dyspnea      ALLERGIES:  Allergies    Compazine (Other)  Compazine (Unknown)    Intolerances        LABS:    Hemoglobin: 9.4 g/dL (12-20 @ 05:15)  Hemoglobin: 10.3 g/dL (12-19 @ 05:18)            Creatinine Trend: 0.78<--, 0.96<--, 1.11<--, 1.08<--, 1.09<--, 1.31<--        hs Troponin:              CSF:                      EKG:   MICROBIOLOGY:    IMAGING:      Labs, imaging, EKG personally reviewed    RADIOLOGY & ADDITIONAL TESTS: Reviewed.

## 2024-12-23 NOTE — PROGRESS NOTE ADULT - ASSESSMENT
85 y/o F PMH Alzheimer's disease, hypothyroidism, DM2, MSSA bacteremia, diverticulitis s/p sigmoidectomy, HLD presents to ED w/fever and cough, found to be in septic shock (POA) 2/2 pneumonia and UTI, was on pressors now off, however with worsening hypoxia despite medical management possibly 2/2 ARDS. Pt was already DNR/DNI, however as she continued to deteriorate clinically plan made in conjunction with family, ICU, and palliative care teams to pursue comfort measures, however family would not like to wean supplemental oxygen at this time.    Discussed again with  at bedside, he understands continuing high flow NC may be prolonging patient's suffering and inevitable passing, however he and his children discussed as a family and they are not ready to withdraw this support at this time. He agrees with continuing comfort care measures only otherwise, and hopes that she will remain comfortable and pass peacefully. Discussed with patient's  that we will continue goals of care daily, and that the family can decide to withdraw HFNC at any time if they decide.    Plan:  - Prn morphine and ativan for resp distress or pain  - HFNC at 40L/80%, no escalation  - Pleasure feeds if desired, PPI  - No lab draws or DVT ppx    Dispo: SICU, if pt remains stable of HFNC will consider transfer to floor tomorrow

## 2024-12-24 NOTE — CHART NOTE - NSCHARTNOTEFT_GEN_A_CORE
This SW met with pts , son Gaston, daughter in law and daughter Myra at bedside to follow up. Pts family would like to continue with High Flow for now as pt. is alert at  times and still eating. They are clear they don't want to escalate care with pressors or resuscitation / intubation etc but for now they wish to continue with High Flow and are not ready for strict comfort. They are aware that they can make the decision at any time to remove High Flow and focus strictly on comfort but they are not ready now. Feelings explored and support provided. Plan to continue with current medical management. Emotional support provided. Our team will continue to follow.
Went to see the patient. As per LATISHA Aleman, the patient has increased respiratory effort. We discussed starting a patient on morphine and Ativan low doses to help with symptoms. The patient's family, including her , is at the bedside and agrees with the plan. Are ware that something could happen at any moment.
Dietitian BRIEF Note    Py made DNR/ DNI, family deciding on hospice/ CMO. Limited nutr interventions warranted. Will no longer follow. Reconsult if GOC change.      Genevieve Tabor, MS, RDN, [facility], [phone]  Certified Nutrition 
This SW met with pts  and son Gaston at bedside to follow up and provide support. Pt. is still requiring High Flow Oxygen. Pts family shared that they are still not ready to make a decision to remove High Flow Oxygen. Pts  shared how this is a difficult decision to make, especially with pt. being alert at times and still eating. Pts family is aware that they can remove the High Flow, understanding pts time would be limited but focus on her comfort at any time. At this time they wish to continue with current medical management and the High Flow. Plan to be further determined. Emotional support provided. Our team will continue to follow.
This SW met with pts daughter Myra and son Gaston at bedside to follow up and provide support. Plan to continue with High Flow Oxygen at this time. Emotional support provided. Our team will continue to follow.
This SW spoke with pts  Irineo via phone to follow up and provide support. Pt. is still on High Flow and plan remains to continue with current medical management. Emotional support provided. Our team will continue to follow.

## 2024-12-24 NOTE — PROGRESS NOTE ADULT - SUBJECTIVE AND OBJECTIVE BOX
OVERNIGHT EVENTS / SUBJECTIVE: Patient seen and examined at bedside.     OBJECTIVE:    VITAL SIGNS:  ICU Vital Signs Last 24 Hrs  T(C): 37.5 (24 Dec 2024 05:00), Max: 38.1 (24 Dec 2024 04:00)  T(F): 99.5 (24 Dec 2024 05:00), Max: 100.5 (24 Dec 2024 04:00)  HR: 92 (24 Dec 2024 02:00) (73 - 92)  BP: 107/55 (24 Dec 2024 02:00) (90/53 - 120/68)  BP(mean): 71 (24 Dec 2024 02:00) (64 - 83)  ABP: --  ABP(mean): --  RR: 21 (24 Dec 2024 02:00) (18 - 25)  SpO2: 96% (24 Dec 2024 02:00) (87% - 96%)    O2 Parameters below as of 24 Dec 2024 02:00  Patient On (Oxygen Delivery Method): nasal cannula, high flow  O2 Flow (L/min): 40  O2 Concentration (%): 60          CAPILLARY BLOOD GLUCOSE          PHYSICAL EXAM:    General: Frail, ill-appearing, in no acute distress, minimally responsive  HEENT: NC/AT; on HFNC  Neck: supple  Respiratory: B/l rhonchi  Cardiovascular: +S1/S2; RRR  Abdomen: soft, NT/ND  Extremities: Cool, mottled, no LE edema  Skin: normal color and turgor; no rash  Neurological: Minimally responsive    MEDICATIONS:  MEDICATIONS  (STANDING):  acetaminophen   IVPB .. 1000 milliGRAM(s) IV Intermittent once  chlorhexidine 4% Liquid 1 Application(s) Topical daily  pantoprazole  Injectable 40 milliGRAM(s) IV Push daily    MEDICATIONS  (PRN):  acetaminophen  Suppository .. 650 milliGRAM(s) Rectal every 6 hours PRN Temp greater or equal to 38C (100.4F)  LORazepam   Injectable 0.25 milliGRAM(s) IV Push every 4 hours PRN anxiety or agitation  morphine  - Injectable 2 milliGRAM(s) IV Push every 4 hours PRN dyspnea      ALLERGIES:  Allergies    Compazine (Other)  Compazine (Unknown)    Intolerances        LABS:    Hemoglobin: 9.4 g/dL (12-20 @ 05:15)            Creatinine Trend: 0.78<--, 0.96<--, 1.11<--, 1.08<--, 1.09<--, 1.31<--        hs Troponin:              CSF:                      EKG:   MICROBIOLOGY:    IMAGING:      Labs, imaging, EKG personally reviewed    RADIOLOGY & ADDITIONAL TESTS: Reviewed. OVERNIGHT EVENTS / SUBJECTIVE: Patient seen and examined at bedside.     No acute events overnight. Pt remains on HFNC 80% with stable O2 sat. Pt appears comfortable, minimally responsive. Receiving prn moprhine and ativan    OBJECTIVE:    VITAL SIGNS:  ICU Vital Signs Last 24 Hrs  T(C): 37.5 (24 Dec 2024 05:00), Max: 38.1 (24 Dec 2024 04:00)  T(F): 99.5 (24 Dec 2024 05:00), Max: 100.5 (24 Dec 2024 04:00)  HR: 92 (24 Dec 2024 02:00) (73 - 92)  BP: 107/55 (24 Dec 2024 02:00) (90/53 - 120/68)  BP(mean): 71 (24 Dec 2024 02:00) (64 - 83)  ABP: --  ABP(mean): --  RR: 21 (24 Dec 2024 02:00) (18 - 25)  SpO2: 96% (24 Dec 2024 02:00) (87% - 96%)    O2 Parameters below as of 24 Dec 2024 02:00  Patient On (Oxygen Delivery Method): nasal cannula, high flow  O2 Flow (L/min): 40  O2 Concentration (%): 60          CAPILLARY BLOOD GLUCOSE          PHYSICAL EXAM:    General: Frail, ill-appearing, in no acute distress, minimally responsive  HEENT: NC/AT; on HFNC  Neck: supple  Respiratory: B/l rhonchi  Cardiovascular: +S1/S2; RRR  Abdomen: soft, NT/ND  Extremities: Cool, mottled, no LE edema  Skin: normal color and turgor; no rash  Neurological: Minimally responsive    MEDICATIONS:  MEDICATIONS  (STANDING):  acetaminophen   IVPB .. 1000 milliGRAM(s) IV Intermittent once  chlorhexidine 4% Liquid 1 Application(s) Topical daily  pantoprazole  Injectable 40 milliGRAM(s) IV Push daily    MEDICATIONS  (PRN):  acetaminophen  Suppository .. 650 milliGRAM(s) Rectal every 6 hours PRN Temp greater or equal to 38C (100.4F)  LORazepam   Injectable 0.25 milliGRAM(s) IV Push every 4 hours PRN anxiety or agitation  morphine  - Injectable 2 milliGRAM(s) IV Push every 4 hours PRN dyspnea      ALLERGIES:  Allergies    Compazine (Other)  Compazine (Unknown)    Intolerances        LABS:    Hemoglobin: 9.4 g/dL (12-20 @ 05:15)            Creatinine Trend: 0.78<--, 0.96<--, 1.11<--, 1.08<--, 1.09<--, 1.31<--        hs Troponin:              CSF:                      EKG:   MICROBIOLOGY:    IMAGING:      Labs, imaging, EKG personally reviewed    RADIOLOGY & ADDITIONAL TESTS: Reviewed.

## 2024-12-24 NOTE — PROGRESS NOTE ADULT - ASSESSMENT
83 y/o F PMH Alzheimer's disease, hypothyroidism, DM2, MSSA bacteremia, diverticulitis s/p sigmoidectomy, HLD presents to ED w/fever and cough, found to be in septic shock (POA) 2/2 pneumonia and UTI, was on pressors now off, however with worsening hypoxia despite medical management possibly 2/2 ARDS. Pt was already DNR/DNI, however as she continued to deteriorate clinically plan made in conjunction with family, ICU, and palliative care teams to pursue comfort measures, however family would not like to wean supplemental oxygen at this time.    Discussed again with  at bedside, he understands continuing high flow NC may be prolonging patient's suffering and inevitable passing, however he and his children discussed as a family and they are not ready to withdraw this support at this time. He agrees with continuing comfort care measures only otherwise, and hopes that she will remain comfortable and pass peacefully. Discussed with patient's  that we will continue goals of care daily, and that the family can decide to withdraw HFNC at any time if they decide.    Plan:  - Prn morphine and ativan for resp distress or pain  - HFNC at 40L/80%, no escalation  - Pleasure feeds if desired, PPI  - No lab draws or DVT ppx    Dispo: SICU, if pt remains stable of HFNC will consider transfer to floor tomorrow 83 y/o F PMH Alzheimer's disease, hypothyroidism, DM2, MSSA bacteremia, diverticulitis s/p sigmoidectomy, HLD presents to ED w/fever and cough, found to be in septic shock (POA) 2/2 pneumonia and UTI, was on pressors now off, however with worsening hypoxia despite medical management possibly 2/2 ARDS. Pt was already DNR/DNI, however as she continued to deteriorate clinically plan made in conjunction with family, ICU, and palliative care teams to pursue comfort measures, however family would not like to wean supplemental oxygen at this time.    Plan:  - Prn morphine and ativan for resp distress or pain  - HFNC at 40L/80%, no escalation  - Pleasure feeds if desired, PPI  - No lab draws or DVT ppx    Dispo: Discussed with daughter again at bedside today, still prefers to not wean HFNC at this time as goal is for patient to not pass on Rich if possible. She agrees with continuing comfort care measures only otherwise. Pt medically stable for transfer to med-surg, signed out to Dr. Pickard, discussed with patient's daughter.

## 2024-12-24 NOTE — PROGRESS NOTE ADULT - ASSESSMENT
83 YO F w/pmhx of Alzheimer's disease, hypothyroidism, DM2, MSSA bacteremia, diverticulitis s/p sigmoidectomy, HLD presents to ED w/fever and cough. Patient found to have septic shock 2/2  pneumonia. UA negative. Received sepsis fluid bolus and additional fluid but remains hypotensive. Patient requiring Levophed off pressors  Lactate trended up to 3.0. Mild GAYLA based on previous labs. Received Azithromycin and Zosyn. DNR/DNI  Patient with possible ARDS on Hi-Flow prognosis guarded on morphine ativan PRN   - Transferred from ICU to floor         Plan:  - Prn morphine and ativan for resp distress or pain  - HFNC at 40L/80%, no escalation  - Pleasure feeds if desired, PPI  - No lab draws or DVT ppx    Dispo:  No labs no blood draws, prognosis guarded DNR, DNI  trial niv, CMO

## 2024-12-24 NOTE — PROGRESS NOTE ADULT - SUBJECTIVE AND OBJECTIVE BOX
Patient is a 84y old  Female who presents with a chief complaint of Septic shock (24 Dec 2024 15:02)      HPI : 83 YO F w/pmhx of Alzheimer's disease, hypothyroidism, DM2, MSSA bacteremia, diverticulitis s/p sigmoidectomy, HLD presents to ED w/fever and cough. Patient found to have septic shock 2/2  pneumonia. UA negative. Received sepsis fluid bolus and additional fluid but remains hypotensive. Patient requiring Levophed off pressors   Lactate trended up to 3.0. Mild GAYLA based on previous labs. Received Azithromycin and Zosyn. DNR/DNI   Patient with possible ARDS on Hi-Flow prognosis guarded on morphine ativan PRN   Transferred from ICU to floor     No labs no blood draws     MEDICATIONS  (STANDING):  acetaminophen   IVPB .. 1000 milliGRAM(s) IV Intermittent once  chlorhexidine 4% Liquid 1 Application(s) Topical daily  pantoprazole  Injectable 40 milliGRAM(s) IV Push daily    MEDICATIONS  (PRN):  acetaminophen  Suppository .. 650 milliGRAM(s) Rectal every 6 hours PRN Temp greater or equal to 38C (100.4F)  LORazepam   Injectable 0.25 milliGRAM(s) IV Push every 4 hours PRN anxiety or agitation  morphine  - Injectable 2 milliGRAM(s) IV Push every 4 hours PRN dyspnea      Allergies    Compazine (Other)  Compazine (Unknown)    Intolerances        REVIEW OF SYSTEMS:  Unable to obtain/Limited due to: AMS    Vital Signs Last 24 Hrs  T(C): 37.5 (24 Dec 2024 05:00), Max: 38.1 (24 Dec 2024 04:00)  T(F): 99.5 (24 Dec 2024 05:00), Max: 100.5 (24 Dec 2024 04:00)  HR: 91 (24 Dec 2024 12:00) (73 - 92)  BP: 91/52 (24 Dec 2024 12:00) (90/53 - 108/58)  BP(mean): 64 (24 Dec 2024 12:00) (64 - 73)  RR: 18 (24 Dec 2024 12:00) (18 - 21)  SpO2: 98% (24 Dec 2024 12:00) (93% - 98%)    Parameters below as of 24 Dec 2024 12:00  Patient On (Oxygen Delivery Method): nasal cannula, high flow    O2 Concentration (%): 80    PHYSICAL EXAM:  General: calm in NAD  Mental Status:  somnolent  Lungs: decreased b/s seen on hfnc this morning  Cardiac: s1s2 no mgr  GI: soft nontender +BS  : voids  Ext: moves all 4 extremities     LABS:    CAPILLARY BLOOD GLUCOSE      RADIOLOGY & ADDITIONAL TESTS:    Personally reviewed.     Consultant(s) Notes Reviewed:  [x] YES  [ ] NO

## 2024-12-24 NOTE — PROGRESS NOTE ADULT - SUBJECTIVE AND OBJECTIVE BOX
HPI:   " 85 YO F w/pmhx of Alzheimer's disease, hypothyroidism, DM2, MSSA bacteremia, diverticulitis s/p sigmoidectomy, HLD presents to ED w/fever and cough. Patient found to have pneumonia. UA negative. Received sepsis fluid bolus and additional fluid but remains hypotensive. Patient is requiring Levophed. Lactate trended up to 3.0. Mild GAYLA based on previous labs. Received Azithromycin and Zosyn.  at bedside. DNR/DNI "    12/16  pt with baseline dementia   unable to participate in goc  pt is dnr dni   presented w/ worsening septeic shock, pneumonia vs. uti     She is currently on HFNC as per yesterdays CC note family was discussing transition to comfort/hspice approach.     12/24  pt seen today and yesterday  in NAD remains on hiflow  family is hoping for continued improvment         PAIN: ( ) YES  ( X)  NO  Pt unable to characterise d/t clinical status     DYSPNEA ( ) Yes ( X) No  Patient unable to characterise d/t clinical status       Level:         Review of Systems:     Unable to obtain/Limited due to: AMS      PHYSICAL EXAM:ICU Vital Signs Last 24 Hrs  T(C): 37.5 (24 Dec 2024 05:00), Max: 38.1 (24 Dec 2024 04:00)  T(F): 99.5 (24 Dec 2024 05:00), Max: 100.5 (24 Dec 2024 04:00)  HR: 91 (24 Dec 2024 12:00) (73 - 92)  BP: 91/52 (24 Dec 2024 12:00) (90/53 - 108/58)  BP(mean): 64 (24 Dec 2024 12:00) (64 - 73)  ABP: --  ABP(mean): --  RR: 18 (24 Dec 2024 12:00) (18 - 21)  SpO2: 98% (24 Dec 2024 12:00) (93% - 98%)    O2 Parameters below as of 24 Dec 2024 12:00  Patient On (Oxygen Delivery Method): nasal cannula, high flow    O2 Concentration (%): 80      PPSV2: 30  %  FAST:    General: calm in NAD  Mental Status:  somnolent  HEENT: eomi,    Lungs: decreased b/s seen on hfnc this morning  Cardiac: s1s2 no mgr  GI: soft nontender +BS  : voids  Ext: moves all 4 extremities         LABS:                        8.7    12.39 )-----------( 259      ( 16 Dec 2024 05:31 )             27.1     12-16    141  |  107  |  29[H]  ----------------------------<  165[H]  3.0[L]   |  29  |  1.09    Ca    8.5      16 Dec 2024 05:31  Phos  3.7     12-16  Mg     2.2     12-16        Albumin: Albumin: 2.7 g/dL (12-11 @ 05:49)      Allergies    Compazine (Other)  Compazine (Unknown)    Intolerances      MEDICATIONS  (STANDING):  chlorhexidine 4% Liquid 1 Application(s) Topical daily  enoxaparin Injectable 30 milliGRAM(s) SubCutaneous every 24 hours  hydrocortisone sodium succinate Injectable 50 milliGRAM(s) IV Push every 8 hours  influenza  Vaccine (HIGH DOSE) 0.5 milliLiter(s) IntraMuscular once  levothyroxine 75 MICROGram(s) Oral daily  pantoprazole    Tablet 40 milliGRAM(s) Oral before breakfast  piperacillin/tazobactam IVPB.. 3.375 Gram(s) IV Intermittent every 8 hours  potassium chloride  10 mEq/100 mL IVPB 10 milliEquivalent(s) IV Intermittent every 1 hour    MEDICATIONS  (PRN):      RADIOLOGY/ADDITIONAL STUDIES:

## 2024-12-24 NOTE — PROGRESS NOTE ADULT - ASSESSMENT
Process of Care  --Reviewed dx/treatment problems and alignment with Goals of Care    Physical Aspects of Care  --Pain  c/w current management    --Bowel Regimen  risk for constipation d/t immobility  daily dulcolax    --Dyspnea  comfortable and in NAD    --Nausea Vomiting  denies    --Weakness  PT as tolerated     Psychological and Psychiatric Aspects of Care:   --Greif/Bereavment: emotional support provided  --Hx of psychiatric dx: none  -Pastoral Care Available PRN     Social Aspects of Care  -SW involved     Cultural Aspects  -Primary Language: English    Goals of Care:     We discussed Palliative Care team being a supportive team when a patient has ongoing illnesses.  We also discussed that it is not an end of life care service, but can help navigate symptoms and emotional support througout their hospital stay here.    Hospice was explained as well  as an end of life care philosophy.  When a disease cannot be cured, or family/patient decide the treatment burdens out weigh the risk and one choses to change focus of treatment from cure to quality/comfort.       Prognosis: Death can occur at anytime, but if disease continues to progress naturally patient likely has days     Ethical and Legal Aspects:   NA        Capacity: no capacity  HCP/Surrogate: is surrogate  Code Status: dnr dni trial niv  MOLST:dnr dni trial niv   Dispo Plan not ready for any changes  Discussed With: Case coordinated with attending and SW and RN     Time Spent: 90 minutes including the care, coordination and counseling of this patient, 50% of which was spent coordinating and counseling.

## 2024-12-25 NOTE — PROGRESS NOTE ADULT - REASON FOR ADMISSION
Septic shock

## 2024-12-25 NOTE — PROGRESS NOTE ADULT - PROVIDER SPECIALTY LIST ADULT
Critical Care
Palliative Care
Critical Care
Palliative Care
Critical Care
Critical Care
Hospitalist
Critical Care
Palliative Care
Critical Care
Critical Care
Hospitalist
Critical Care

## 2024-12-25 NOTE — PROGRESS NOTE ADULT - SUBJECTIVE AND OBJECTIVE BOX
Patient is a 84y old  Female who presents with a chief complaint of Septic shock (24 Dec 2024 15:02)        HPI : 85 YO F w/pmhx of Alzheimer's disease, hypothyroidism, DM2, MSSA bacteremia, diverticulitis s/p sigmoidectomy, HLD presents to ED w/fever and cough. Patient found to have septic shock 2/2  pneumonia. UA negative. Received sepsis fluid bolus and additional fluid but remains hypotensive. Patient requiring Levophed off pressors   Lactate trended up to 3.0. Mild GAYLA based on previous labs. Received Azithromycin and Zosyn. DNR/DNI   Patient with possible ARDS on Hi-Flow prognosis guarded on morphine ativan PRN   Transferred from ICU to floor yesterday     No labs no blood draws     MEDICATIONS  (STANDING):  acetaminophen   IVPB .. 1000 milliGRAM(s) IV Intermittent once  chlorhexidine 4% Liquid 1 Application(s) Topical daily    MEDICATIONS  (PRN):  LORazepam   Injectable 0.25 milliGRAM(s) IV Push every 4 hours PRN anxiety or agitation  morphine  - Injectable 2 milliGRAM(s) IV Push every 4 hours PRN dyspnea      Allergies    Compazine (Other)  Compazine (Unknown)    Intolerances      REVIEW OF SYSTEMS:  Unable to obtain/Limited due to: AMS      Vital Signs Last 24 Hrs  T(C): 37.1 (25 Dec 2024 08:38), Max: 38.4 (24 Dec 2024 21:50)  T(F): 98.7 (25 Dec 2024 08:38), Max: 101.1 (24 Dec 2024 21:50)  HR: 50 (25 Dec 2024 08:38) (50 - 50)  BP: 74/39 (25 Dec 2024 08:38) (74/39 - 74/39)  BP(mean): --  RR: 12 (25 Dec 2024 08:38) (12 - 12)  SpO2: 74% (25 Dec 2024 08:38) (74% - 74%)    Parameters below as of 25 Dec 2024 08:38  Patient On (Oxygen Delivery Method): nasal cannula, high flow    O2 Concentration (%): 80    PHYSICAL EXAM:  General: calm in NAD  Mental Status:  somnolent  Lungs: decreased b/s seen on hfnc this morning  Cardiac: s1s2 no mgr  GI: soft nontender +BS  : voids  Ext: moves all 4 extremities     LABS:      CAPILLARY BLOOD GLUCOSE    RADIOLOGY & ADDITIONAL TESTS:    Personally reviewed.     Consultant(s) Notes Reviewed:  [x] YES  [ ] NO

## 2024-12-25 NOTE — PROGRESS NOTE ADULT - ASSESSMENT
83 YO F w/pmhx of Alzheimer's disease, hypothyroidism, DM2, MSSA bacteremia, diverticulitis s/p sigmoidectomy, HLD presents to ED w/fever and cough. Patient found to have septic shock 2/2  pneumonia. UA negative. Received sepsis fluid bolus and additional fluid but remains hypotensive. Patient requiring Levophed off pressors  Lactate trended up to 3.0. Mild GAYLA based on previous labs. Received Azithromycin and Zosyn. DNR/DNI  Patient with possible ARDS on Hi-Flow prognosis guarded on morphine ativan PRN   - Transferred from ICU to floor 12/24      Plan:  - Prn morphine and ativan for resp distress or pain  - HFNC at 40L/80%, no escalation  - Pleasure feeds if desired, PPI  - No lab draws or DVT ppx    Dispo:  No labs no blood draws, prognosis guarded DNR, DNI  trial niv, CMO

## 2024-12-25 NOTE — PROGRESS NOTE ADULT - TIME BILLING
.
direct patient care including but not limited to reviewing chart, medications ,laboratory data, imaging reports, discussion of plan of care with consultants on the case, coordination of care with multidisciplinary team involved in the case and discussion of plan with, family ,  family agreeable to plan of care and verbalized understanding the anticipated hospital course and treatment plan.
.
direct patient care including but not limited to reviewing chart, medications ,laboratory data, imaging reports, discussion of plan of care with consultants on the case, coordination of care with multidisciplinary team involved in the case and discussion of plan with, family ,  family agreeable to plan of care and verbalized understanding the anticipated hospital course and treatment plan.

## 2024-12-26 NOTE — DISCHARGE NOTE FOR THE EXPIRED PATIENT - HOSPITAL COURSE
Nurse notified me that patient  at 12:30 am, evaluated pt did not have breath sounds or pulse,  was notified.

## 2024-12-31 DIAGNOSIS — E78.00 PURE HYPERCHOLESTEROLEMIA, UNSPECIFIED: ICD-10-CM

## 2024-12-31 DIAGNOSIS — R65.21 SEVERE SEPSIS WITH SEPTIC SHOCK: ICD-10-CM

## 2024-12-31 DIAGNOSIS — E03.9 HYPOTHYROIDISM, UNSPECIFIED: ICD-10-CM

## 2024-12-31 DIAGNOSIS — E87.20 ACIDOSIS, UNSPECIFIED: ICD-10-CM

## 2024-12-31 DIAGNOSIS — J80 ACUTE RESPIRATORY DISTRESS SYNDROME: ICD-10-CM

## 2024-12-31 DIAGNOSIS — G93.40 ENCEPHALOPATHY, UNSPECIFIED: ICD-10-CM

## 2024-12-31 DIAGNOSIS — J18.9 PNEUMONIA, UNSPECIFIED ORGANISM: ICD-10-CM

## 2024-12-31 DIAGNOSIS — E43 UNSPECIFIED SEVERE PROTEIN-CALORIE MALNUTRITION: ICD-10-CM

## 2024-12-31 DIAGNOSIS — N17.9 ACUTE KIDNEY FAILURE, UNSPECIFIED: ICD-10-CM

## 2024-12-31 DIAGNOSIS — Z51.5 ENCOUNTER FOR PALLIATIVE CARE: ICD-10-CM

## 2024-12-31 DIAGNOSIS — A41.51 SEPSIS DUE TO ESCHERICHIA COLI [E. COLI]: ICD-10-CM

## 2024-12-31 DIAGNOSIS — E11.9 TYPE 2 DIABETES MELLITUS WITHOUT COMPLICATIONS: ICD-10-CM

## 2024-12-31 DIAGNOSIS — N39.0 URINARY TRACT INFECTION, SITE NOT SPECIFIED: ICD-10-CM

## 2024-12-31 DIAGNOSIS — Z66 DO NOT RESUSCITATE: ICD-10-CM

## 2024-12-31 DIAGNOSIS — R00.1 BRADYCARDIA, UNSPECIFIED: ICD-10-CM

## 2024-12-31 DIAGNOSIS — B96.1 KLEBSIELLA PNEUMONIAE [K. PNEUMONIAE] AS THE CAUSE OF DISEASES CLASSIFIED ELSEWHERE: ICD-10-CM

## 2024-12-31 DIAGNOSIS — R32 UNSPECIFIED URINARY INCONTINENCE: ICD-10-CM

## 2024-12-31 DIAGNOSIS — Z79.890 HORMONE REPLACEMENT THERAPY: ICD-10-CM

## 2024-12-31 DIAGNOSIS — F02.80 DEMENTIA IN OTHER DISEASES CLASSIFIED ELSEWHERE, UNSPECIFIED SEVERITY, WITHOUT BEHAVIORAL DISTURBANCE, PSYCHOTIC DISTURBANCE, MOOD DISTURBANCE, AND ANXIETY: ICD-10-CM

## 2024-12-31 DIAGNOSIS — B95.61 METHICILLIN SUSCEPTIBLE STAPHYLOCOCCUS AUREUS INFECTION AS THE CAUSE OF DISEASES CLASSIFIED ELSEWHERE: ICD-10-CM

## 2024-12-31 DIAGNOSIS — Z88.8 ALLERGY STATUS TO OTHER DRUGS, MEDICAMENTS AND BIOLOGICAL SUBSTANCES: ICD-10-CM

## 2024-12-31 DIAGNOSIS — G30.9 ALZHEIMER'S DISEASE, UNSPECIFIED: ICD-10-CM
